# Patient Record
Sex: FEMALE | Race: WHITE | NOT HISPANIC OR LATINO | Employment: OTHER | URBAN - METROPOLITAN AREA
[De-identification: names, ages, dates, MRNs, and addresses within clinical notes are randomized per-mention and may not be internally consistent; named-entity substitution may affect disease eponyms.]

---

## 2017-09-02 ENCOUNTER — HOSPITAL ENCOUNTER (EMERGENCY)
Facility: OTHER | Age: 57
Discharge: HOME | End: 2017-09-02
Attending: INTERNAL MEDICINE
Payer: MEDICARE

## 2017-09-02 VITALS
RESPIRATION RATE: 17 BRPM | OXYGEN SATURATION: 100 % | WEIGHT: 150 LBS | HEART RATE: 67 BPM | BODY MASS INDEX: 24.99 KG/M2 | TEMPERATURE: 98 F | HEIGHT: 65 IN | SYSTOLIC BLOOD PRESSURE: 132 MMHG | DIASTOLIC BLOOD PRESSURE: 68 MMHG

## 2017-09-02 DIAGNOSIS — K59.03 DRUG-INDUCED CONSTIPATION: Primary | ICD-10-CM

## 2017-09-02 DIAGNOSIS — G89.18 LEFT LOWER QUADRANT ABDOMINAL PAIN FOLLOWING CHOLANGIOGRAM: ICD-10-CM

## 2017-09-02 DIAGNOSIS — R10.32 LEFT LOWER QUADRANT ABDOMINAL PAIN FOLLOWING CHOLANGIOGRAM: ICD-10-CM

## 2017-09-02 LAB
ALBUMIN SERPL-MCNC: 3.8 G/DL (ref 3.3–5.5)
ALP SERPL-CCNC: 76 U/L (ref 42–141)
BILIRUB SERPL-MCNC: 0.4 MG/DL (ref 0.2–1.6)
BUN SERPL-MCNC: 14 MG/DL (ref 7–22)
CALCIUM SERPL-MCNC: 9.4 MG/DL (ref 8–10.3)
CHLORIDE SERPL-SCNC: 101 MMOL/L (ref 98–108)
CREAT SERPL-MCNC: 1 MG/DL (ref 0.6–1.2)
GLUCOSE SERPL-MCNC: 86 MG/DL (ref 73–118)
POC ALT (SGPT): 12 U/L (ref 10–47)
POC AST (SGOT): 27 U/L (ref 11–38)
POC TCO2: 26 MMOL/L (ref 18–33)
POTASSIUM BLD-SCNC: 4.2 MMOL/L (ref 3.6–5.1)
PROTEIN, POC: 6.4 G/DL (ref 6.4–8.1)
SODIUM BLD-SCNC: 139 MMOL/L (ref 128–145)

## 2017-09-02 PROCEDURE — 80053 COMPREHEN METABOLIC PANEL: CPT

## 2017-09-02 PROCEDURE — 25000003 PHARM REV CODE 250: Performed by: INTERNAL MEDICINE

## 2017-09-02 PROCEDURE — 99283 EMERGENCY DEPT VISIT LOW MDM: CPT

## 2017-09-02 PROCEDURE — 85025 COMPLETE CBC W/AUTO DIFF WBC: CPT

## 2017-09-02 RX ORDER — SYRING-NEEDL,DISP,INSUL,0.3 ML 29 G X1/2"
296 SYRINGE, EMPTY DISPOSABLE MISCELLANEOUS
Status: COMPLETED | OUTPATIENT
Start: 2017-09-02 | End: 2017-09-02

## 2017-09-02 RX ORDER — ZOLPIDEM TARTRATE 10 MG/1
5 TABLET ORAL NIGHTLY PRN
COMMUNITY

## 2017-09-02 RX ADMIN — MAGNESIUM CITRATE 296 ML: 1.75 LIQUID ORAL at 06:09

## 2017-09-02 NOTE — ED NOTES
Report received from EMILY Laguna, states pt here for abd pain/constipation, awaiting urinalysis, will continue to monitor

## 2017-09-02 NOTE — ED PROVIDER NOTES
"Encounter Date: 9/2/2017       History     Chief Complaint   Patient presents with    Abdominal Pain     pt presents to ER with c/o LLQ pain since Tuesday, denies any nausea, vomiting or fever.  Has h/o IBS with constipation.       57-year-old female presents to the emergency department complaining of left lower quadrant pain ×4 days.  She states she has a history of recurring constipation because she takes opiate medications regularly for her neck and back.  She also states she had a "small" bowel movement yesterday.  She denies fevers/chills, nausea/vomiting      The history is provided by the patient. No  was used.   Abdominal Pain   The current episode started yesterday. The onset of the illness was gradual. The abdominal pain is located in the LLQ. The abdominal pain does not radiate. The severity of the abdominal pain is 4/10. The abdominal pain is relieved by nothing. The other symptoms of the illness do not include fever, melena, nausea, vomiting, diarrhea or dysuria.   Symptoms associated with the illness do not include frequency.     Review of patient's allergies indicates:   Allergen Reactions    Bee pollens     Mushroom flavor      Past Medical History:   Diagnosis Date    Arthritis     Chronic back pain     Encounter for blood transfusion     Fatigue     Fibromyalgia     GERD (gastroesophageal reflux disease)     Herniated disc     Multiple sclerosis     Ovarian tumor     Radiculopathy     Restless legs syndrome (RLS)     Spasticity     Stroke     twice    TIA (transient ischemic attack)      Past Surgical History:   Procedure Laterality Date    CEREBRAL ANEURYSM REPAIR      HYSTERECTOMY      age 27    KNEE SURGERY  2008    left    pain pump implant      AL REMOVAL OF OVARY/TUBE(S)      SPINE SURGERY  2006      2 on neck.  2 on lumbar spine    TONSILLECTOMY      WRIST SURGERY  2007    right     Family History   Problem Relation Age of Onset    Cancer Mother  " "    Lung cancer    Cancer Father      Lung cancer     Social History   Substance Use Topics    Smoking status: Current Every Day Smoker     Packs/day: 0.50     Years: 30.00     Types: Cigarettes    Smokeless tobacco: Never Used      Comment: .    Occup:   disabled.   , lives alone.      Alcohol use Yes      Comment: socially     Review of Systems   Constitutional: Negative for fever.   Gastrointestinal: Positive for abdominal pain. Negative for diarrhea, melena, nausea and vomiting.   Genitourinary: Negative for dysuria, flank pain and frequency.   All other systems reviewed and are negative.      Physical Exam     Initial Vitals [17 0500]   BP Pulse Resp Temp SpO2   (!) 150/90 87 18 97.7 °F (36.5 °C) 98 %      MAP       110         Physical Exam    Nursing note and vitals reviewed.  Constitutional: She appears well-developed and well-nourished.   HENT:   Head: Normocephalic and atraumatic.   Right Ear: External ear normal.   Left Ear: External ear normal.   Eyes: EOM are normal.   Neck: Normal range of motion.   Cardiovascular: Normal rate, regular rhythm and normal heart sounds.   Pulmonary/Chest: Breath sounds normal. No respiratory distress.   Abdominal: Soft. Bowel sounds are normal. She exhibits no distension and no mass. There is tenderness. There is no rebound and no guarding.   Musculoskeletal: Normal range of motion.   Neurological: She is alert.   Skin: Skin is warm and dry.   Psychiatric: She has a normal mood and affect. Thought content normal.         ED Course   Procedures  Labs Reviewed   POCT CBC   POCT CMP   POCT CMP             Medical Decision Making:   Initial Assessment:   57-year-old female presents to the emergency department complaining of left lower quadrant pain ×4 days.  She states she has a history of recurring constipation because she takes opiate medications regularly for her neck and back.  She also states she had a "small" bowel movement yesterday.  She denies " fevers/chills, nausea/vomiting  Differential Diagnosis:   Constipation  Acute diverticulitis  Acute appendicitis  Acute cystitis  ED Management:  CBC and complete metabolic panel were performed.  CMP was within normal limits.  X-ray of abdomen revealed signs of constipation.  Patient was given instructions for constipation and magnesium citrate in the emergency department.  She was advised to follow-up with her primary care physician in 2 days for reevaluation.                   ED Course      Clinical Impression:   The primary encounter diagnosis was Drug-induced constipation. A diagnosis of Left lower quadrant abdominal pain following cholangiogram was also pertinent to this visit.    Disposition:   Disposition: Discharged  Condition: Stable                        Jose Angel Samano MD  09/02/17 0714

## 2017-09-02 NOTE — ED NOTES
Patient given copy of discharge instructions and prescriptions. New medications were explained to patient. Patient verbalizes understanding. Stressed the importance of f/u appointment and when to return for worsening symptoms. Patient was then escorted by RN to discharge window

## 2017-09-02 NOTE — ED NOTES
Pt supine in bed, alert and oriented, reports left lower abd pain for the past several days, pt is alert and oriented, ARIELLE

## 2017-12-04 PROBLEM — R10.32 LEFT LOWER QUADRANT ABDOMINAL PAIN FOLLOWING CHOLANGIOGRAM: Status: RESOLVED | Noted: 2017-09-02 | Resolved: 2017-12-04

## 2017-12-04 PROBLEM — G89.18 LEFT LOWER QUADRANT ABDOMINAL PAIN FOLLOWING CHOLANGIOGRAM: Status: RESOLVED | Noted: 2017-09-02 | Resolved: 2017-12-04

## 2018-02-05 ENCOUNTER — OFFICE VISIT (OUTPATIENT)
Dept: NEUROSURGERY | Facility: CLINIC | Age: 58
End: 2018-02-05
Payer: MEDICARE

## 2018-02-05 VITALS
HEART RATE: 82 BPM | SYSTOLIC BLOOD PRESSURE: 152 MMHG | HEIGHT: 66 IN | WEIGHT: 143.31 LBS | DIASTOLIC BLOOD PRESSURE: 93 MMHG | TEMPERATURE: 99 F | BODY MASS INDEX: 23.03 KG/M2

## 2018-02-05 DIAGNOSIS — G89.29 CHRONIC LOW BACK PAIN, UNSPECIFIED BACK PAIN LATERALITY, WITH SCIATICA PRESENCE UNSPECIFIED: Primary | ICD-10-CM

## 2018-02-05 DIAGNOSIS — G89.4 CHRONIC PAIN SYNDROME: ICD-10-CM

## 2018-02-05 DIAGNOSIS — M96.1 LUMBAR POST-LAMINECTOMY SYNDROME: ICD-10-CM

## 2018-02-05 DIAGNOSIS — M54.5 CHRONIC LOW BACK PAIN, UNSPECIFIED BACK PAIN LATERALITY, WITH SCIATICA PRESENCE UNSPECIFIED: Primary | ICD-10-CM

## 2018-02-05 PROCEDURE — 99999 PR PBB SHADOW E&M-EST. PATIENT-LVL III: CPT | Mod: PBBFAC,,, | Performed by: NEUROLOGICAL SURGERY

## 2018-02-05 PROCEDURE — 99214 OFFICE O/P EST MOD 30 MIN: CPT | Mod: S$GLB,,, | Performed by: NEUROLOGICAL SURGERY

## 2018-02-05 PROCEDURE — 3008F BODY MASS INDEX DOCD: CPT | Mod: S$GLB,,, | Performed by: NEUROLOGICAL SURGERY

## 2018-02-05 RX ORDER — OMEPRAZOLE 20 MG/1
CAPSULE, DELAYED RELEASE ORAL
COMMUNITY
Start: 2017-12-27

## 2018-02-05 RX ORDER — DULOXETIN HYDROCHLORIDE 60 MG/1
60 CAPSULE, DELAYED RELEASE ORAL
COMMUNITY
End: 2018-05-21 | Stop reason: ALTCHOICE

## 2018-02-05 RX ORDER — NAPROXEN 500 MG/1
TABLET ORAL
Status: ON HOLD | COMMUNITY
Start: 2018-01-04 | End: 2018-02-27 | Stop reason: HOSPADM

## 2018-02-05 NOTE — PROGRESS NOTES
History & Physical    SUBJECTIVE:     Chief Complaint: Chronic pain syndrome.    History of Present Illness:  Ms. Reilly is a 58-year-old female who is referred to me by Dr. Neyamr Horner.  Her past medical history is significant for both sclerosis, fibromyalgia, RLS, stroke, chronic pain syndrome, and lumbar post-laminectomy syndrome.  He has a complicated past surgical history which includes 2 surgeries on her neck and 2 surgeries on her back which were done by Dr. Rob.  She underwent an intrathecal catheter and pain pump placement which did not help her pain and was ultimately removed.  Despite the surgeries in the pain pump placement she still complains of low back pain and neck pain.  She was evaluated for surgical intervention and was deemed to not be a surgical candidate.  Ultimately, she underwent a spinal cord stimulator trial which was complicated by leak migration.  She felt that she had good enough relief during the trial to proceed with the permanent placement.  This was done in 2016.  She now states the stimulator never helped her pain.  In November 2017 she tried to turn the stimulator on and felt an electrical shock.  Therefore, she now wishes to have it removed.    Review of patient's allergies indicates:   Allergen Reactions    Bee pollens     Mushroom flavor        Current Outpatient Prescriptions   Medication Sig Dispense Refill    aspirin 81 MG Chew Take 81 mg by mouth once daily.      celecoxib (CELEBREX) 200 MG capsule Take 200 mg by mouth 2 (two) times daily.        cholecalciferol, vitamin D3, (VITAMIN D3) 1,000 unit capsule Take 1,000 Units by mouth once daily.      DULoxetine (CYMBALTA) 60 MG capsule Take 60 mg by mouth.      hydrocodone-acetaminophen 10-325mg (NORCO)  mg Tab Take 1 tablet by mouth every 4 to 6 hours as needed. 14 tablet 0    naproxen (NAPROSYN) 500 MG tablet       omeprazole (PRILOSEC) 20 MG capsule       QUETIAPINE FUMARATE (SEROQUEL ORAL) Take by mouth.       solifenacin (VESICARE) 5 MG tablet Take 10 mg by mouth once daily.      vitamin E 400 UNIT capsule Take 400 Units by mouth once daily.      zolpidem (AMBIEN) 10 mg Tab Take 5 mg by mouth nightly as needed.       No current facility-administered medications for this visit.        Past Medical History:   Diagnosis Date    Arthritis     Chronic back pain     Encounter for blood transfusion     Fatigue     Fibromyalgia     GERD (gastroesophageal reflux disease)     Herniated disc     Multiple sclerosis     Ovarian tumor     Radiculopathy     Restless legs syndrome (RLS)     Spasticity     Stroke     twice    TIA (transient ischemic attack)      Past Surgical History:   Procedure Laterality Date    CEREBRAL ANEURYSM REPAIR      HYSTERECTOMY      age 27    KNEE SURGERY  2008    left    pain pump implant      PA REMOVAL OF OVARY/TUBE(S)      SPINE SURGERY  2006      2 on neck.  2 on lumbar spine    TONSILLECTOMY      WRIST SURGERY  2007    right     Family History     Problem Relation (Age of Onset)    Cancer Mother, Father        Social History     Social History    Marital status:      Spouse name: N/A    Number of children: N/A    Years of education: N/A     Social History Main Topics    Smoking status: Current Every Day Smoker     Packs/day: 0.50     Years: 30.00     Types: Cigarettes    Smokeless tobacco: Never Used      Comment: .    Occup:   disabled.   , lives alone.      Alcohol use Yes      Comment: socially    Drug use: No    Sexual activity: No     Other Topics Concern    None     Social History Narrative    None       Review of Systems:  Review of Systems   Constitutional: Negative for activity change, diaphoresis, fatigue, fever and unexpected weight change.   Eyes: Positive for visual disturbance.   Respiratory: Negative for cough, shortness of breath and wheezing.    Cardiovascular: Negative for chest pain and palpitations.   Gastrointestinal:  "Positive for abdominal distention and constipation. Negative for abdominal pain, blood in stool, diarrhea, nausea and vomiting.   Genitourinary: Negative for difficulty urinating, dysuria, frequency and urgency.   Musculoskeletal: Positive for back pain and neck pain. Negative for gait problem, joint swelling, myalgias and neck stiffness.   Skin: Negative for color change, rash and wound.   Allergic/Immunologic: Negative for environmental allergies and food allergies.   Neurological: Positive for headaches. Negative for dizziness, seizures, facial asymmetry, speech difficulty, weakness, light-headedness and numbness.   Hematological: Does not bruise/bleed easily.   Psychiatric/Behavioral: Negative for agitation, behavioral problems, dysphoric mood and hallucinations. The patient is not nervous/anxious.        OBJECTIVE:     Vital Signs  Temp: 98.9 °F (37.2 °C)  Pulse: 82  BP: (!) 152/93  Pain Score:   7  Height: 5' 6" (167.6 cm)  Weight: 65 kg (143 lb 4.8 oz)  Body mass index is 23.13 kg/m².      Physical Exam:  Physical Exam:    Constitutional: She appears well-developed and well-nourished. No distress.     Eyes: Pupils are equal, round, and reactive to light. Conjunctivae and EOM are normal.     Cardiovascular: Normal rate, regular rhythm, normal pulses and no edema.     Abdominal: Soft. Bowel sounds are normal.     Skin: Skin displays no rash on trunk and no rash on extremities. Skin displays no lesions on trunk and no lesions on extremities.     Psych/Behavior: She is alert. She is oriented to person, place, and time. She has a normal mood and affect.     Musculoskeletal: Gait is normal.        Neck: Range of motion is full. There is no tenderness. Muscle strength is 5/5. Tone is normal.        Back: Range of motion is full. There is no tenderness. Muscle strength is 5/5. Tone is normal.        Right Upper Extremities: Range of motion is full. There is no tenderness. Muscle strength is 5/5. Tone is normal.       "  Left Upper Extremities: Range of motion is full. There is no tenderness. Muscle strength is 5/5. Tone is normal.       Right Lower Extremities: Range of motion is full. There is no tenderness. Muscle strength is 5/5. Tone is normal.        Left Lower Extremities: Range of motion is full. There is no tenderness. Muscle strength is 5/5. Tone is normal.     Neurological:        Coordination: She has a normal Romberg Test, normal finger to nose coordination and normal tandem walking coordination.        Sensory: There is no sensory deficit in the trunk. There is no sensory deficit in the extremities.        DTRs: DTRs are DTRS NORMAL AND SYMMETRICnormal and symmetric. She displays no Babinski's sign on the right side. She displays no Babinski's sign on the left side.        Cranial nerves: Cranial nerve(s) II, III, IV, V, VI, VII, VIII, IX, X, XI and XII are intact.         Diagnostic Results:  She has an abdominal x-ray available for review which I personally reviewed.  She has 2 percutaneous electrodes in the thoracic spine, one from T8-T10 and the second from T9-T11.  There is a pulse generator over the right gluteal region.    ASSESSMENT/PLAN:     Ms Reilly is a 58-year-old female with multiple cervical and lumbar surgeries.  She has undergone both a pain pump and spinal cord stimulator neither of which have helped her pain significantly.  The spinal cord simulator now seems to be malfunctioning and she wishes to have it removed.  I explained the procedure in detail to the patient as well as the risks and benefits and pros and cons.  She wishes to proceed at this time.  We will get all the appropriate preoperative testing and schedule surgery in the near future.  She knows that she needs to be off of her aspirin for 1 week prior to surgery.  She knows that she can call with any further questions or concerns.        Note dictated with voice recognition software, please excuse any grammatical errors.

## 2018-02-06 ENCOUNTER — TELEPHONE (OUTPATIENT)
Dept: NEUROSURGERY | Facility: CLINIC | Age: 58
End: 2018-02-06

## 2018-02-06 DIAGNOSIS — G89.4 CHRONIC PAIN SYNDROME: Primary | ICD-10-CM

## 2018-02-06 DIAGNOSIS — M96.1 POST LAMINECTOMY SYNDROME: ICD-10-CM

## 2018-02-26 ENCOUNTER — ANESTHESIA EVENT (OUTPATIENT)
Dept: SURGERY | Facility: HOSPITAL | Age: 58
End: 2018-02-26
Payer: MEDICARE

## 2018-02-26 NOTE — ANESTHESIA PREPROCEDURE EVALUATION
02/26/2018  Courtney Reilly is a 58 y.o., female with a pre-operative diagnosis of Chronic pain syndrome [G89.4]  Post laminectomy syndrome [M96.1] who is scheduled for Procedure(s) (LRB):  REMOVAL-SPINAL NEUROSTIMULATOR (N/A).     Requested anesthesia type: General  Surgeon: Bernadette Thompson MD  Allergies:   Review of patient's allergies indicates:   Allergen Reactions    Bee pollens     Mushroom flavor      Vital Sign Range: Temp:  [36.9 °C (98.4 °F)] 36.9 °C (98.4 °F)  Pulse:  [73] 73  Resp:  [18] 18  SpO2:  [100 %] 100 %  BP: (150)/(84) 150/84  Chronic Medications:   Prescriptions Prior to Admission   Medication Sig Dispense Refill Last Dose    aspirin 81 MG Chew Take 81 mg by mouth once daily.   Past Week at Unknown time    celecoxib (CELEBREX) 200 MG capsule Take 200 mg by mouth 2 (two) times daily.     Past Week at Unknown time    cholecalciferol, vitamin D3, (VITAMIN D3) 1,000 unit capsule Take 1,000 Units by mouth once daily.   2/26/2018 at 1100    DULoxetine (CYMBALTA) 60 MG capsule Take 60 mg by mouth.   2/26/2018 at 1100    hydrocodone-acetaminophen 10-325mg (NORCO)  mg Tab Take 1 tablet by mouth every 4 to 6 hours as needed. 14 tablet 0 2/26/2018 at 2000    naproxen (NAPROSYN) 500 MG tablet    2/26/2018 at 1100    omeprazole (PRILOSEC) 20 MG capsule    Past Week at Unknown time    QUETIAPINE FUMARATE (SEROQUEL ORAL) Take by mouth.   2/26/2018 at 2000    solifenacin (VESICARE) 5 MG tablet Take 10 mg by mouth once daily.   Past Week at Unknown time    vitamin E 400 UNIT capsule Take 400 Units by mouth once daily.   2/26/2018 at 1100    zolpidem (AMBIEN) 10 mg Tab Take 5 mg by mouth nightly as needed.   Past Week     Current Medications:   Current Facility-Administered Medications   Medication Dose Route Frequency Provider Last Rate Last Dose    0.9%  NaCl infusion   Intravenous  Continuous Jasbir Mckee MD 10 mL/hr at 02/27/18 0600 10 mL/hr at 02/27/18 0600    0.9%  NaCl infusion   Intravenous Continuous Jan Avila MD        ceFAZolin injection 2 g  2 g Intravenous On Call Procedure Jan Avila MD        lidocaine (PF) 10 mg/ml (1%) injection 10 mg  1 mL Intradermal Once Jasbir Mckee MD        mupirocin 2 % ointment   Nasal On Call Procedure Jan Avila MD         Medical History:   Past Medical History:   Diagnosis Date    Arthritis     Chronic back pain     Encounter for blood transfusion     Fatigue     Fibromyalgia     GERD (gastroesophageal reflux disease)     Herniated disc     Multiple sclerosis     Ovarian tumor     Radiculopathy     Restless legs syndrome (RLS)     Spasticity     Stroke     twice    TIA (transient ischemic attack)      .    Anesthesia Evaluation    I have reviewed the Patient Summary Reports.    I have reviewed the Nursing Notes.   I have reviewed the Medications.     Review of Systems  Anesthesia Hx:  No problems with previous Anesthesia  Denies Family Hx of Anesthesia complications.   Denies Personal Hx of Anesthesia complications.   Hepatic/GI:   GERD, well controlled    Musculoskeletal:   Arthritis   Spine Disorders: lumbar and thoracic    Neurological:   TIA, CVA Neuromuscular Disease,        Physical Exam   Airway/Jaw/Neck:  Airway Findings: Mouth Opening: Normal Tongue: Normal  General Airway Assessment: Adult, Good  Jaw/Neck Findings:     Neck ROM: Normal ROM      Dental:  Dental Findings: Upper Dentures, Lower Dentures   Chest/Lungs:  Chest/Lungs Findings: Clear to auscultation, Normal Respiratory Rate     Heart/Vascular:  Heart Findings: Rate: Normal  Rhythm: Regular Rhythm  Sounds: Normal     Abdomen:  Abdomen Findings:  Normal, Soft, Nontender            Anesthesia Plan  Type of Anesthesia, risks & benefits discussed:  Anesthesia Type:  general, MAC  Patient's Preference: as indicated  Intra-op Monitoring Plan:  standard ASA monitors  Intra-op Monitoring Plan Comments:   Post Op Pain Control Plan:   Post Op Pain Control Plan Comments:   Induction:   IV  Beta Blocker:  Patient is not currently on a Beta-Blocker (No further documentation required).       Informed Consent: Patient understands risks and agrees with Anesthesia plan.  Questions answered. Anesthesia consent signed with patient.  ASA Score: 3     Day of Surgery Review of History & Physical:  There are no significant changes.  H&P update referred to the provider.     Anesthesia Plan Notes: Reassurance given.        Ready For Surgery From Anesthesia Perspective.

## 2018-02-27 ENCOUNTER — ANESTHESIA (OUTPATIENT)
Dept: SURGERY | Facility: HOSPITAL | Age: 58
End: 2018-02-27
Payer: MEDICARE

## 2018-02-27 ENCOUNTER — HOSPITAL ENCOUNTER (OUTPATIENT)
Facility: HOSPITAL | Age: 58
Discharge: HOME | End: 2018-02-27
Attending: NEUROLOGICAL SURGERY | Admitting: NEUROLOGICAL SURGERY
Payer: MEDICARE

## 2018-02-27 VITALS
OXYGEN SATURATION: 99 % | BODY MASS INDEX: 21.97 KG/M2 | WEIGHT: 140 LBS | SYSTOLIC BLOOD PRESSURE: 145 MMHG | DIASTOLIC BLOOD PRESSURE: 85 MMHG | RESPIRATION RATE: 15 BRPM | HEIGHT: 67 IN | TEMPERATURE: 98 F | HEART RATE: 69 BPM

## 2018-02-27 DIAGNOSIS — G89.29 CHRONIC PAIN: ICD-10-CM

## 2018-02-27 LAB
ABO + RH BLD: NORMAL
APTT BLDCRRT: 26.4 SEC
BLD GP AB SCN CELLS X3 SERPL QL: NORMAL
INR PPP: 0.9
PROTHROMBIN TIME: 9.8 SEC

## 2018-02-27 PROCEDURE — S0028 INJECTION, FAMOTIDINE, 20 MG: HCPCS | Performed by: NURSE ANESTHETIST, CERTIFIED REGISTERED

## 2018-02-27 PROCEDURE — 36000707: Performed by: NEUROLOGICAL SURGERY

## 2018-02-27 PROCEDURE — 86850 RBC ANTIBODY SCREEN: CPT

## 2018-02-27 PROCEDURE — 27201423 OPTIME MED/SURG SUP & DEVICES STERILE SUPPLY: Performed by: NEUROLOGICAL SURGERY

## 2018-02-27 PROCEDURE — D9220A PRA ANESTHESIA: Mod: ANES,,, | Performed by: ANESTHESIOLOGY

## 2018-02-27 PROCEDURE — 63661 REMOVE SPINE ELTRD PERQ ARAY: CPT | Mod: 51,,, | Performed by: NEUROLOGICAL SURGERY

## 2018-02-27 PROCEDURE — 63600175 PHARM REV CODE 636 W HCPCS: Performed by: NURSE ANESTHETIST, CERTIFIED REGISTERED

## 2018-02-27 PROCEDURE — 63600175 PHARM REV CODE 636 W HCPCS: Performed by: STUDENT IN AN ORGANIZED HEALTH CARE EDUCATION/TRAINING PROGRAM

## 2018-02-27 PROCEDURE — 94761 N-INVAS EAR/PLS OXIMETRY MLT: CPT

## 2018-02-27 PROCEDURE — 25000003 PHARM REV CODE 250: Performed by: NEUROLOGICAL SURGERY

## 2018-02-27 PROCEDURE — 71000039 HC RECOVERY, EACH ADD'L HOUR: Performed by: NEUROLOGICAL SURGERY

## 2018-02-27 PROCEDURE — 37000009 HC ANESTHESIA EA ADD 15 MINS: Performed by: NEUROLOGICAL SURGERY

## 2018-02-27 PROCEDURE — 25000003 PHARM REV CODE 250: Performed by: NURSE ANESTHETIST, CERTIFIED REGISTERED

## 2018-02-27 PROCEDURE — 25000003 PHARM REV CODE 250: Performed by: ANESTHESIOLOGY

## 2018-02-27 PROCEDURE — 85610 PROTHROMBIN TIME: CPT

## 2018-02-27 PROCEDURE — S0028 INJECTION, FAMOTIDINE, 20 MG: HCPCS | Performed by: ANESTHESIOLOGY

## 2018-02-27 PROCEDURE — 63688 REV/RMV IMP SP NPG/R DTCH CN: CPT | Mod: ,,, | Performed by: NEUROLOGICAL SURGERY

## 2018-02-27 PROCEDURE — 71000033 HC RECOVERY, INTIAL HOUR: Performed by: NEUROLOGICAL SURGERY

## 2018-02-27 PROCEDURE — 25000003 PHARM REV CODE 250: Performed by: STUDENT IN AN ORGANIZED HEALTH CARE EDUCATION/TRAINING PROGRAM

## 2018-02-27 PROCEDURE — 27000221 HC OXYGEN, UP TO 24 HOURS

## 2018-02-27 PROCEDURE — 63600175 PHARM REV CODE 636 W HCPCS: Performed by: NEUROLOGICAL SURGERY

## 2018-02-27 PROCEDURE — 85730 THROMBOPLASTIN TIME PARTIAL: CPT

## 2018-02-27 PROCEDURE — D9220A PRA ANESTHESIA: Mod: CRNA,,, | Performed by: NURSE ANESTHETIST, CERTIFIED REGISTERED

## 2018-02-27 PROCEDURE — 37000008 HC ANESTHESIA 1ST 15 MINUTES: Performed by: NEUROLOGICAL SURGERY

## 2018-02-27 PROCEDURE — 71000015 HC POSTOP RECOV 1ST HR: Performed by: NEUROLOGICAL SURGERY

## 2018-02-27 PROCEDURE — 36000706: Performed by: NEUROLOGICAL SURGERY

## 2018-02-27 PROCEDURE — 25000003 PHARM REV CODE 250

## 2018-02-27 RX ORDER — MUPIROCIN 20 MG/G
OINTMENT TOPICAL
Status: DISCONTINUED | OUTPATIENT
Start: 2018-02-27 | End: 2018-02-27 | Stop reason: HOSPADM

## 2018-02-27 RX ORDER — MUPIROCIN 20 MG/G
1 OINTMENT TOPICAL 2 TIMES DAILY
Status: DISCONTINUED | OUTPATIENT
Start: 2018-02-27 | End: 2018-02-27 | Stop reason: HOSPADM

## 2018-02-27 RX ORDER — FENTANYL CITRATE 50 UG/ML
INJECTION, SOLUTION INTRAMUSCULAR; INTRAVENOUS
Status: DISCONTINUED
Start: 2018-02-27 | End: 2018-02-27 | Stop reason: WASHOUT

## 2018-02-27 RX ORDER — ONDANSETRON 2 MG/ML
4 INJECTION INTRAMUSCULAR; INTRAVENOUS DAILY PRN
Status: DISCONTINUED | OUTPATIENT
Start: 2018-02-27 | End: 2018-02-27 | Stop reason: HOSPADM

## 2018-02-27 RX ORDER — OXYCODONE AND ACETAMINOPHEN 10; 325 MG/1; MG/1
1 TABLET ORAL EVERY 4 HOURS PRN
Status: DISCONTINUED | OUTPATIENT
Start: 2018-02-27 | End: 2018-02-27 | Stop reason: HOSPADM

## 2018-02-27 RX ORDER — VANCOMYCIN HYDROCHLORIDE 1 G/20ML
INJECTION, POWDER, LYOPHILIZED, FOR SOLUTION INTRAVENOUS
Status: DISCONTINUED | OUTPATIENT
Start: 2018-02-27 | End: 2018-02-27 | Stop reason: HOSPADM

## 2018-02-27 RX ORDER — OXYCODONE AND ACETAMINOPHEN 10; 325 MG/1; MG/1
TABLET ORAL
Status: COMPLETED
Start: 2018-02-27 | End: 2018-02-27

## 2018-02-27 RX ORDER — PROPOFOL 10 MG/ML
VIAL (ML) INTRAVENOUS
Status: DISCONTINUED | OUTPATIENT
Start: 2018-02-27 | End: 2018-02-27

## 2018-02-27 RX ORDER — DEXAMETHASONE SODIUM PHOSPHATE 4 MG/ML
INJECTION, SOLUTION INTRA-ARTICULAR; INTRALESIONAL; INTRAMUSCULAR; INTRAVENOUS; SOFT TISSUE
Status: DISCONTINUED | OUTPATIENT
Start: 2018-02-27 | End: 2018-02-27

## 2018-02-27 RX ORDER — MUPIROCIN 20 MG/G
1 OINTMENT TOPICAL
Status: COMPLETED | OUTPATIENT
Start: 2018-02-27 | End: 2018-02-27

## 2018-02-27 RX ORDER — ACETAMINOPHEN 10 MG/ML
INJECTION, SOLUTION INTRAVENOUS
Status: DISCONTINUED | OUTPATIENT
Start: 2018-02-27 | End: 2018-02-27

## 2018-02-27 RX ORDER — MIDAZOLAM HYDROCHLORIDE 1 MG/ML
INJECTION, SOLUTION INTRAMUSCULAR; INTRAVENOUS
Status: DISCONTINUED | OUTPATIENT
Start: 2018-02-27 | End: 2018-02-27

## 2018-02-27 RX ORDER — PHENYLEPHRINE HYDROCHLORIDE 10 MG/ML
INJECTION INTRAVENOUS
Status: DISCONTINUED | OUTPATIENT
Start: 2018-02-27 | End: 2018-02-27

## 2018-02-27 RX ORDER — LIDOCAINE HYDROCHLORIDE AND EPINEPHRINE 10; 10 MG/ML; UG/ML
INJECTION, SOLUTION INFILTRATION; PERINEURAL
Status: DISCONTINUED | OUTPATIENT
Start: 2018-02-27 | End: 2018-02-27 | Stop reason: HOSPADM

## 2018-02-27 RX ORDER — ROCURONIUM BROMIDE 10 MG/ML
INJECTION, SOLUTION INTRAVENOUS
Status: DISCONTINUED | OUTPATIENT
Start: 2018-02-27 | End: 2018-02-27

## 2018-02-27 RX ORDER — METOCLOPRAMIDE HYDROCHLORIDE 5 MG/ML
10 INJECTION INTRAMUSCULAR; INTRAVENOUS EVERY 10 MIN PRN
Status: DISCONTINUED | OUTPATIENT
Start: 2018-02-27 | End: 2018-02-27 | Stop reason: HOSPADM

## 2018-02-27 RX ORDER — GLYCOPYRROLATE 0.2 MG/ML
INJECTION INTRAMUSCULAR; INTRAVENOUS
Status: DISCONTINUED | OUTPATIENT
Start: 2018-02-27 | End: 2018-02-27

## 2018-02-27 RX ORDER — ACETAMINOPHEN 325 MG/1
325 TABLET ORAL EVERY 6 HOURS PRN
Status: DISCONTINUED | OUTPATIENT
Start: 2018-02-27 | End: 2018-02-27 | Stop reason: HOSPADM

## 2018-02-27 RX ORDER — METOPROLOL TARTRATE 1 MG/ML
INJECTION, SOLUTION INTRAVENOUS
Status: DISCONTINUED | OUTPATIENT
Start: 2018-02-27 | End: 2018-02-27

## 2018-02-27 RX ORDER — SODIUM CHLORIDE 9 MG/ML
INJECTION, SOLUTION INTRAVENOUS CONTINUOUS
Status: DISCONTINUED | OUTPATIENT
Start: 2018-02-27 | End: 2018-02-27 | Stop reason: HOSPADM

## 2018-02-27 RX ORDER — ONDANSETRON 2 MG/ML
INJECTION INTRAMUSCULAR; INTRAVENOUS
Status: DISCONTINUED | OUTPATIENT
Start: 2018-02-27 | End: 2018-02-27

## 2018-02-27 RX ORDER — FAMOTIDINE 10 MG/ML
20 INJECTION INTRAVENOUS ONCE
Status: COMPLETED | OUTPATIENT
Start: 2018-02-27 | End: 2018-02-27

## 2018-02-27 RX ORDER — FAMOTIDINE 10 MG/ML
INJECTION INTRAVENOUS
Status: DISCONTINUED | OUTPATIENT
Start: 2018-02-27 | End: 2018-02-27

## 2018-02-27 RX ORDER — LIDOCAINE HYDROCHLORIDE 10 MG/ML
1 INJECTION, SOLUTION EPIDURAL; INFILTRATION; INTRACAUDAL; PERINEURAL ONCE
Status: DISCONTINUED | OUTPATIENT
Start: 2018-02-27 | End: 2018-02-27 | Stop reason: HOSPADM

## 2018-02-27 RX ORDER — CEFAZOLIN SODIUM 1 G/3ML
2 INJECTION, POWDER, FOR SOLUTION INTRAMUSCULAR; INTRAVENOUS
Status: COMPLETED | OUTPATIENT
Start: 2018-02-27 | End: 2018-02-27

## 2018-02-27 RX ORDER — ONDANSETRON 2 MG/ML
4 INJECTION INTRAMUSCULAR; INTRAVENOUS EVERY 8 HOURS PRN
Status: DISCONTINUED | OUTPATIENT
Start: 2018-02-27 | End: 2018-02-27 | Stop reason: HOSPADM

## 2018-02-27 RX ORDER — CEPHALEXIN 500 MG/1
500 CAPSULE ORAL EVERY 6 HOURS
Qty: 16 CAPSULE | Refills: 0 | Status: SHIPPED | OUTPATIENT
Start: 2018-02-27 | End: 2018-03-03

## 2018-02-27 RX ORDER — BACITRACIN 50000 [IU]/1
INJECTION, POWDER, FOR SOLUTION INTRAMUSCULAR
Status: DISCONTINUED | OUTPATIENT
Start: 2018-02-27 | End: 2018-02-27 | Stop reason: HOSPADM

## 2018-02-27 RX ORDER — KETAMINE HCL IN 0.9 % NACL 50 MG/5 ML
SYRINGE (ML) INTRAVENOUS
Status: DISCONTINUED | OUTPATIENT
Start: 2018-02-27 | End: 2018-02-27

## 2018-02-27 RX ORDER — FENTANYL CITRATE 50 UG/ML
25 INJECTION, SOLUTION INTRAMUSCULAR; INTRAVENOUS EVERY 5 MIN PRN
Status: DISCONTINUED | OUTPATIENT
Start: 2018-02-27 | End: 2018-02-27 | Stop reason: HOSPADM

## 2018-02-27 RX ORDER — FENTANYL CITRATE 50 UG/ML
INJECTION, SOLUTION INTRAMUSCULAR; INTRAVENOUS
Status: DISCONTINUED | OUTPATIENT
Start: 2018-02-27 | End: 2018-02-27

## 2018-02-27 RX ORDER — OXYCODONE AND ACETAMINOPHEN 5; 325 MG/1; MG/1
1 TABLET ORAL EVERY 4 HOURS PRN
Qty: 15 TABLET | Refills: 0 | Status: SHIPPED | OUTPATIENT
Start: 2018-02-27 | End: 2018-04-09 | Stop reason: ALTCHOICE

## 2018-02-27 RX ORDER — LIDOCAINE HCL/PF 100 MG/5ML
SYRINGE (ML) INTRAVENOUS
Status: DISCONTINUED | OUTPATIENT
Start: 2018-02-27 | End: 2018-02-27

## 2018-02-27 RX ORDER — NEOSTIGMINE METHYLSULFATE 1 MG/ML
INJECTION, SOLUTION INTRAVENOUS
Status: DISCONTINUED | OUTPATIENT
Start: 2018-02-27 | End: 2018-02-27

## 2018-02-27 RX ADMIN — PHENYLEPHRINE HYDROCHLORIDE 200 MCG: 10 INJECTION INTRAVENOUS at 08:02

## 2018-02-27 RX ADMIN — FAMOTIDINE 20 MG: 10 INJECTION, SOLUTION INTRAVENOUS at 05:02

## 2018-02-27 RX ADMIN — CEFAZOLIN 2 G: 330 INJECTION, POWDER, FOR SOLUTION INTRAMUSCULAR; INTRAVENOUS at 07:02

## 2018-02-27 RX ADMIN — PROPOFOL 200 MG: 10 INJECTION, EMULSION INTRAVENOUS at 07:02

## 2018-02-27 RX ADMIN — OXYCODONE AND ACETAMINOPHEN 1 TABLET: 10; 325 TABLET ORAL at 09:02

## 2018-02-27 RX ADMIN — METOPROLOL TARTRATE 2 MG: 5 INJECTION, SOLUTION INTRAVENOUS at 07:02

## 2018-02-27 RX ADMIN — PHENYLEPHRINE HYDROCHLORIDE 150 MCG: 10 INJECTION INTRAVENOUS at 08:02

## 2018-02-27 RX ADMIN — NEOSTIGMINE METHYLSULFATE 3 MG: 1 INJECTION INTRAVENOUS at 08:02

## 2018-02-27 RX ADMIN — FAMOTIDINE 20 MG: 10 INJECTION, SOLUTION INTRAVENOUS at 08:02

## 2018-02-27 RX ADMIN — ACETAMINOPHEN 1000 MG: 10 INJECTION, SOLUTION INTRAVENOUS at 07:02

## 2018-02-27 RX ADMIN — MIDAZOLAM HYDROCHLORIDE 2 MG: 1 INJECTION, SOLUTION INTRAMUSCULAR; INTRAVENOUS at 06:02

## 2018-02-27 RX ADMIN — METOPROLOL TARTRATE 1 MG: 5 INJECTION, SOLUTION INTRAVENOUS at 08:02

## 2018-02-27 RX ADMIN — Medication 30 MG: at 07:02

## 2018-02-27 RX ADMIN — GLYCOPYRROLATE 0.4 MG: 0.2 INJECTION, SOLUTION INTRAMUSCULAR; INTRAVENOUS at 08:02

## 2018-02-27 RX ADMIN — MIDAZOLAM HYDROCHLORIDE 2 MG: 1 INJECTION, SOLUTION INTRAMUSCULAR; INTRAVENOUS at 07:02

## 2018-02-27 RX ADMIN — GLYCOPYRROLATE 0.2 MG: 0.2 INJECTION, SOLUTION INTRAMUSCULAR; INTRAVENOUS at 06:02

## 2018-02-27 RX ADMIN — MUPIROCIN 1 G: 20 OINTMENT TOPICAL at 05:02

## 2018-02-27 RX ADMIN — SODIUM CHLORIDE 10 ML/HR: 0.9 INJECTION, SOLUTION INTRAVENOUS at 06:02

## 2018-02-27 RX ADMIN — LIDOCAINE HYDROCHLORIDE 100 MG: 20 INJECTION, SOLUTION INTRAVENOUS at 07:02

## 2018-02-27 RX ADMIN — DEXAMETHASONE SODIUM PHOSPHATE 4 MG: 4 INJECTION, SOLUTION INTRAMUSCULAR; INTRAVENOUS at 08:02

## 2018-02-27 RX ADMIN — ROCURONIUM BROMIDE 30 MG: 10 INJECTION, SOLUTION INTRAVENOUS at 07:02

## 2018-02-27 RX ADMIN — SODIUM CHLORIDE, SODIUM GLUCONATE, SODIUM ACETATE, POTASSIUM CHLORIDE, MAGNESIUM CHLORIDE, SODIUM PHOSPHATE, DIBASIC, AND POTASSIUM PHOSPHATE: .53; .5; .37; .037; .03; .012; .00082 INJECTION, SOLUTION INTRAVENOUS at 07:02

## 2018-02-27 RX ADMIN — ONDANSETRON 4 MG: 2 INJECTION INTRAMUSCULAR; INTRAVENOUS at 08:02

## 2018-02-27 RX ADMIN — FENTANYL CITRATE 50 MCG: 50 INJECTION, SOLUTION INTRAMUSCULAR; INTRAVENOUS at 07:02

## 2018-02-27 RX ADMIN — OXYCODONE HYDROCHLORIDE AND ACETAMINOPHEN 1 TABLET: 10; 325 TABLET ORAL at 09:02

## 2018-02-27 RX ADMIN — SODIUM CHLORIDE 1000 ML: 0.9 INJECTION, SOLUTION INTRAVENOUS at 05:02

## 2018-02-27 RX ADMIN — PROPOFOL 50 MG: 10 INJECTION, EMULSION INTRAVENOUS at 07:02

## 2018-02-27 NOTE — PROGRESS NOTES
Pt does not want to have family or friend wait in rm w/her. Pt also refuses to give up her phone, glasses, and dentures.

## 2018-02-27 NOTE — ANESTHESIA POSTPROCEDURE EVALUATION
"Anesthesia Post Evaluation    Patient: Courtney CURRAN Reilly    Procedure(s) Performed: Procedure(s) (LRB):  REMOVAL-SPINAL NEUROSTIMULATOR (N/A)    Final Anesthesia Type: general  Patient location during evaluation: [facility]  Patient participation: Yes- Able to Participate  Level of consciousness: awake and alert and oriented  Post-procedure vital signs: reviewed and stable  Pain management: adequate  Airway patency: patent  PONV status at discharge: No PONV  Anesthetic complications: no      Cardiovascular status: stable  Respiratory status: unassisted, spontaneous ventilation and room air  Hydration status: euvolemic  Follow-up not needed.        Visit Vitals  BP (!) 145/85   Pulse 69   Temp 36.6 °C (97.9 °F) (Temporal)   Resp 15   Ht 5' 7" (1.702 m)   Wt 63.5 kg (140 lb)   SpO2 99%   BMI 21.93 kg/m²       Pain/Laila Score: Pain Assessment Performed: Yes (2/27/2018 10:45 AM)  Presence of Pain: denies (2/27/2018 10:45 AM)  Pain Rating Prior to Med Admin: 7 (2/27/2018  9:46 AM)  Pain Rating Post Med Admin: 0 (2/27/2018  5:21 AM)  Laila Score: 10 (2/27/2018 10:00 AM)      "

## 2018-02-27 NOTE — BRIEF OP NOTE
[facility]  Brief Operative Note    SUMMARY     Surgery Date: 2/27/2018     Surgeon(s) and Role:     * Jan Avila MD - Resident - Assisting     * Bernadette Thompson MD - Primary        Pre-op Diagnosis:  Chronic pain syndrome [G89.4]  Post laminectomy syndrome [M96.1]    Post-op Diagnosis:  Post-Op Diagnosis Codes:     * Chronic pain syndrome [G89.4]     * Post laminectomy syndrome [M96.1]    Procedure(s) (LRB):  REMOVAL-SPINAL NEUROSTIMULATOR (N/A)    Anesthesia: General    Description of Procedure: Thoracic SCS and right IPG removal      Estimated Blood Loss: * No values recorded between 2/27/2018  7:56 AM and 2/27/2018  9:02 AM *         Specimens:   Specimen (12h ago through future)    None

## 2018-02-27 NOTE — DISCHARGE SUMMARY
[facility]  Neurosurgery  Discharge Summary      Patient Name: Courtney Reilly  MRN: [id number]  Admission Date: 2/27/2018  Hospital Length of Stay: 0 days  Discharge Date and Time:  02/27/2018 9:11 AM  Attending Physician: Bernadette hTompson MD   Discharging Provider: Jan Avila MD  Primary Care Provider: Lalitha Mai MD     HPI: 59 yo female presented for elective total component removal of thoracic SCS which she tolerated well, she will be discharged home today with instructions to resume regular diet and follow up in outpatient clinic.    Procedure(s) (LRB):  REMOVAL-SPINAL NEUROSTIMULATOR (N/A)     Hospital Course: As above.    Consults:     Significant Diagnostic Studies: Labs: All labs within the past 24 hours have been reviewed    Pending Diagnostic Studies:     Procedure Component Value Units Date/Time    SURG FL Surgery Fluoro Less Than 1 Hour [[id number]]     Order Status:  Sent Lab Status:  No result         Final Active Diagnoses:    Diagnosis Date Noted POA    PRINCIPAL PROBLEM:  Chronic pain [G89.29] 02/27/2018 Yes      Problems Resolved During this Admission:    Diagnosis Date Noted Date Resolved POA      Discharged Condition: good    Disposition: Home or Self Care    Follow Up:  Follow-up Information     Bernadette Thompson MD In 2 weeks.    Specialty:  Neurosurgery  Why:  For wound re-check  Contact information:  [address] [place] [address] [id number] [phone]                 Patient Instructions:     Diet general     Call MD for:  temperature >100.4     Call MD for:  persistent nausea and vomiting     Call MD for:  severe uncontrolled pain     Call MD for:  difficulty breathing, headache or visual disturbances     Call MD for:  redness, tenderness, or signs of infection (pain, swelling, redness, odor or green/yellow discharge around incision site)     Call MD for:  hives     Call MD for:  persistent dizziness or light-headedness     Call MD for:  extreme fatigue     Remove dressing  in 48 hours       Medications:  Reconciled Home Medications:   Current Discharge Medication List      START taking these medications    Details   cephALEXin (KEFLEX) 500 MG capsule Take 1 capsule (500 mg total) by mouth every 6 (six) hours.  Qty: 16 capsule, Refills: 0      oxyCODONE-acetaminophen (PERCOCET) 5-325 mg per tablet Take 1 tablet by mouth every 4 (four) hours as needed for Pain.  Qty: 15 tablet, Refills: 0         CONTINUE these medications which have NOT CHANGED    Details   celecoxib (CELEBREX) 200 MG capsule Take 200 mg by mouth 2 (two) times daily.        cholecalciferol, vitamin D3, (VITAMIN D3) 1,000 unit capsule Take 1,000 Units by mouth once daily.      DULoxetine (CYMBALTA) 60 MG capsule Take 60 mg by mouth.      omeprazole (PRILOSEC) 20 MG capsule       QUETIAPINE FUMARATE (SEROQUEL ORAL) Take by mouth.      solifenacin (VESICARE) 5 MG tablet Take 10 mg by mouth once daily.      vitamin E 400 UNIT capsule Take 400 Units by mouth once daily.      zolpidem (AMBIEN) 10 mg Tab Take 5 mg by mouth nightly as needed.         STOP taking these medications       aspirin 81 MG Chew Comments:   Reason for Stopping:         hydrocodone-acetaminophen 10-325mg (NORCO)  mg Tab Comments:   Reason for Stopping:         naproxen (NAPROSYN) 500 MG tablet Comments:   Reason for Stopping:               Jan Avila MD  Neurosurgery  [facility]

## 2018-02-27 NOTE — ANESTHESIA RELEASE NOTE
"Anesthesia Release from PACU Note    Patient: Courtney CURRAN Reilly    Procedure(s) Performed: Procedure(s) (LRB):  REMOVAL-SPINAL NEUROSTIMULATOR (N/A)    Anesthesia type: GEN    Post pain: Adequate analgesia reported    Post assessment: no apparent anesthetic complications, tolerated procedure well and no evidence of recall    Post vital signs: BP (!) 145/85   Pulse 69   Temp 36.6 °C (97.9 °F) (Temporal)   Resp 15   Ht 5' 7" (1.702 m)   Wt 63.5 kg (140 lb)   SpO2 99%   BMI 21.93 kg/m²     Level of consciousness: awake, alert and oriented    Nausea/Vomiting: no nausea/no vomiting    Complications: none    Airway Patency: patent    Respiratory: unassisted, spontaneous ventilation, room air    Cardiovascular: stable and blood pressure at baseline    Hydration: euvolemic    "

## 2018-02-27 NOTE — TRANSFER OF CARE
"Anesthesia Transfer of Care Note    Patient: Courtney CURRAN Reilly    Procedure(s) Performed: Procedure(s) (LRB):  REMOVAL-SPINAL NEUROSTIMULATOR (N/A)    Patient location: PACU    Anesthesia Type: general    Transport from OR: Transported from OR on 6-10 L/min O2 by face mask with adequate spontaneous ventilation    Post pain: adequate analgesia    Post assessment: no apparent anesthetic complications    Post vital signs: stable    Level of consciousness: lethargic    Nausea/Vomiting: no nausea/vomiting    Complications: none    Transfer of care protocol was followed      Last vitals:   Visit Vitals  BP (!) 157/75 (BP Location: Left arm, Patient Position: Lying)   Pulse 95   Temp 35.7 °C (96.2 °F) (Axillary)   Resp 19   Ht 5' 7" (1.702 m)   Wt 63.5 kg (140 lb)   SpO2 100%   BMI 21.93 kg/m²     "

## 2018-02-27 NOTE — H&P
History and Physical  Neurosurgery    Admit Date: 2/27/2018  LOS: 0    Code Status: No Order     CC: <principal problem not specified>    SUBJECTIVE:     History of Present Illness: 57 yo female with pmh of multiple spinal surgeries and post-laminectomy syndrome s/p thoracic SCS and right IPG placement (2016) presenting for elective total component removal.    Pt is neurologically intact on exam.           OBJECTIVE:   Vital Signs (Most Recent):   Temp: 98.4 °F (36.9 °C) (02/27/18 0549)  Pulse: 73 (02/27/18 0549)  Resp: 18 (02/27/18 0549)  BP: (!) 150/84 (02/27/18 0549)  SpO2: 100 % (02/27/18 0549)    Vital Signs (24h Range):   Temp:  [98.4 °F (36.9 °C)] 98.4 °F (36.9 °C)  Pulse:  [73] 73  Resp:  [18] 18  SpO2:  [100 %] 100 %  BP: (150)/(84) 150/84      I & O (Last 24h):  No intake or output data in the 24 hours ending 02/27/18 0607    Physical Exam:  General: well developed, well nourished, no distress.   Head: normocephalic, atraumatic  Cervical Spine: No midline tenderness to palpation.  Thoracolumbosacral Spine: No midline tenderness to palpation.  GCS: Motor: 6/Verbal: 5/Eyes: 4 GCS Total: 15  Mental Status: Awake, Alert, Oriented x 4  Language: No aphasia  Speech: No dysarthria  Facial Droop: None   Cranial nerves: CN III-XII grossly intact.  Visual Fields: Intact.   Eyes: Pupils equal and reactive to light. Intact Conjugate horizontal and vertical pursuit. No nystagmus. No gaze deviation.   Pulmonary: No distress.  Sensory: No deficit.  Propioception: No deficit in 1st digit of toe bilaterally.  Rectal Tone: Not tested.  Drift: None.  Upper Extremity Ataxia: No Dysmetria Bilaterally  Lower Extremity Ataxia: Not tested.  Dysdiadochokinesia: Not tested.  Reflexes: 2+ patellar bilaterally.  Ellis: Absent  Clonus: Absent  Babinski: Absent  Romberg: Not Tested  Pulses: Brisk and symmetric radial, DP and tibial pulses.  Motor Strength:    Strength  Shoulder Abduction Elbow Extension Elbow Flexion Wrist Extension  Wrist Flexion Finger Opposition Finger Add Finger Abd   Upper: R 5/5 5/5 5/5 5/5 5/5 5/5 5/5 5/5    L 5/5 5/5 5/5 5/5 5/5 5/5 5/5 5/5     Hip Flexion Knee Extension Knee  Flexion Ankle Dflexion Ankle Pflexion EHL     Lower: R 5/5 5/5 5/5 5/5 5/5 5/5      L 5/5 5/5 5/5 5/5 5/5 5/5             Lines/Drains/Airway:          Nutrition/Tube Feeds:   Current Diet Order   Procedures    Diet NPO Except for: Medication     Order Specific Question:   Except for     Answer:   Medication       Labs:  ABG: No results for input(s): PH, PO2, PCO2, HCO3, POCSATURATED, BE in the last 24 hours.  BMP:No results for input(s): NA, K, CL, CO2, BUN, CREATININE, GLU, MG, PHOS in the last 24 hours.  LFT:   Lab Results   Component Value Date    AST 19 07/26/2014    ALT 15 07/26/2014    ALKPHOS 75 07/26/2014    BILITOT 0.4 07/26/2014    ALBUMIN 4.5 07/26/2014    PROT 7.2 07/26/2014     CBC:   Lab Results   Component Value Date    WBC 12.76 (H) 07/26/2014    HGB 13.6 07/26/2014    HCT 41.0 07/26/2014    MCV 93 07/26/2014     (H) 07/26/2014     Microbiology x 7d:   Microbiology Results (last 7 days)     ** No results found for the last 168 hours. **            ASSESSMENT/PLAN:   57 yo female with pmh of multiple spinal surgeries and post-laminectomy syndrome s/p thoracic SCS and right IPG placement (2016) presenting for elective total component removal.    --To OR for SCS and IPG removal    Jan Avila

## 2018-02-27 NOTE — DISCHARGE INSTRUCTIONS
Please follow ONLY the instructions that are checked below.    Activity Restrictions:  [x]  Return to work will be determined on an individual basis.  [x]  No lifting greater than 10 pounds.  [x]  Avoid bending and twisting the area of your surgery more than 45 degrees from neutral position in any direction.  [x]  No driving or operating machinery:  [x]  until cleared by your surgeon.  [x]  while taking narcotic pain medications or muscle relaxants.  [x]  Increase ambulation over the next 2 weeks so that you are walking 2 miles per day at 2 weeks post-operatively.  [x]  Walk on paved surfaces only. It is okay to walk up and down stairs while holding onto a side rail.  [x]  No sexual activity for 2-3 weeks.    Discharge Medication/Follow-up:  [x]  Please refer to discharge medication reconciliation form.  [x]  Do not take ANY non-steroidal anti-inflammatory drugs (NSAIDS), including the following: ibuprofen, naprosyn, Aleve, Advil, Indocin, Mobic, or Celebrex for:  [x]  4 weeks  []  8 weeks  []  6 months  [x]  Take docusate (Colace 100 mg): take one capsule a day as needed for constipation. You can get this over the counter.  [x]  Follow-up appointment:  [x]  10-14 days post-op for wound check by physician assistant/nurse  [x]  An appointment will be mailed to you.    Wound Care:  [x]  Remove dressing or bandaid in 2 days.  [x]  You may shower on the 2nd day after your surgery. Have the force of water hit you opposite from the incision. Pat the incision dry after your shower; do not scrub the incision.  [x]  You cannot take a bath until 8 weeks after surgery.  []  Apply bacitracin to incision twice a day for    more days.    Call your doctor or go to the Emergency Room for any signs of infection, including: increased redness, drainage, pain, or fever (temperature ?101.5 for 24 hours). Call your doctor or go to the Emergency Room if there are any localized neurological changes; problems with speech, vision, numbness,  tingling, weakness, or severe headache; or for other concerns.    Special Instructions:  [x]  No use of tobacco products.  [x]  Diet: Please eat a regular diet as tolerated.    Physicians need 3 days' notice for pain medicine to be refilled. Pain medicine will only be refilled between 8 AM and 5 PM, Monday through Friday, due to Food and Drug Administration regulation of documentation.    If you have any questions about this form, please call [phone].    Form No. 44716 (Revised 10/31/2013)

## 2018-03-05 NOTE — OP NOTE
DATE OF PROCEDURE:  02/27/2018    PREOPERATIVE DIAGNOSES:  Chronic pain syndrome and lumbar post-laminectomy   syndrome.    POSTOPERATIVE DIAGNOSES:  Chronic pain syndrome and lumbar post-laminectomy   syndrome.    OPERATIVE PROCEDURES:  1.  Removal of percutaneous spinal cord stimulator electrode.  2.  Removal of spinal cord stimulator pulse generator.    SURGEON:  Bernadette Thompson M.D.    ASSISTANT:  Jan Avila M.D. (RES)    ANESTHESIA:  General.    ESTIMATED BLOOD LOSS:  25 mL.    INDICATION FOR PROCEDURE:  Ms. Reilly is a 58-year-old female with a longstanding   history of chronic pain syndrome and lumbar post-laminectomy syndrome.  She has   a complicated past surgical history, which included two lumbar surgeries done   several years ago.  This did not help her pain.  She underwent an intrathecal   catheter and pain pump placement, which also not helped her pain and was   ultimately removed.  She then had a spinal cord stimulator trial and subsequent   placement of permanent percutaneous electrodes done in 2016.  She now states   that the spinal cord stimulator system never helped her pain.  Furthermore in   November 2017 when she turned the stimulator on, she felt an electrical shock.    For these reasons, she now wishes to have the stimulator removed.    OPERATIVE NOTE:  After obtaining informed consent, the patient was brought into   the Operating Room.  She was intubated and anesthetized by Anesthesia.    Preoperative antibiotics were administered.  The patient was placed prone on the   operating room table.  All appropriate pressure points were padded.  Her   previous midline back incision as well as left gluteal region incision were   identified and marked on the skin.  The patient was then prepped and draped in   the standard sterile fashion.  A 1% lidocaine with epinephrine was injected into   the skin.  Skin incision was made first in the left gluteal region pocket using   the #15 blade.  This was carried  down to the level of the battery pocket using   Bovie electrocautery.  The pulse generator was identified and elevated out of   the pocket.  The electrode wires going into the pulse generator were cut.  The   pulse generator was then passed off the field as specimen.  We then turned our   attention to the thoracic region incision.  Again, skin incision was made using   the #15 blade.  This was carried down to the level of the lumbodorsal fascia   using Bovie electrocautery.  The two anchoring booties were identified.  These   were freed from the surrounding scar tissue using Bovie electrocautery.  Once   the booties were freed, the epidural percutaneous electrodes were pulled free   from the epidural space without any resistance or problems.  The remainder of   the electrodes were then pulled free from the battery pocket without any   complications.  The entirety of the electrodes were passed off the field as   specimen.  During our dissection of the midline back incision, we found the   patient's old intrathecal catheter.  Although there was no CSF seen coming from   the intrathecal portion of the catheter.  We tied off the catheter end with two   2-0 silk sutures to ensure that the patient would not have any problems in the   future.  Both wounds were then copiously irrigated.  Hemostasis was obtained   using bipolar electrocautery and FloSeal.  The wounds were closed in layers.    Sterile dressings were put into place.  The patient was flipped back supine and   extubated by Anesthesia.  She was brought to the Recovery Room in stable   condition.  All counts were correct at the end of the case.      BRENNAN/PAMELA  dd: 03/04/2018 18:41:35 (CST)  td: 03/04/2018 21:27:32 (CST)  Doc ID   #[id number]  Job ID #[id number]    CC:

## 2018-03-15 ENCOUNTER — CLINICAL SUPPORT (OUTPATIENT)
Dept: NEUROSURGERY | Facility: CLINIC | Age: 58
End: 2018-03-15
Payer: MEDICARE

## 2018-03-15 VITALS
HEIGHT: 67 IN | HEART RATE: 82 BPM | TEMPERATURE: 99 F | SYSTOLIC BLOOD PRESSURE: 151 MMHG | BODY MASS INDEX: 22.44 KG/M2 | WEIGHT: 143 LBS | DIASTOLIC BLOOD PRESSURE: 99 MMHG

## 2018-03-15 PROCEDURE — 99999 PR PBB SHADOW E&M-EST. PATIENT-LVL III: CPT | Mod: PBBFAC,,,

## 2018-03-15 NOTE — PROGRESS NOTES
Patient seen in clinic for 2 week post op s/p SCS removal with Dr Thompson on 02/27/2018.  Pain is  Being controlled with pain management doctor      Incisions on back and right buttock assessed, dissolvable sutures used for closure no swelling or purulent drainage, edges well approximated.     Patient was instructed as follows:    Discontinue Bacitracin after tonight.   May shower normally but pat dry after shower.   Keep incision clean, dry and open to air as much as possible.   Patient encouraged to walk as much as possible but advised to walk with family member or friend and rest as necessary.   No lifting >10lbs.   Post-op instructions reviewed with emphasis on body mechanics.   Return to work will be determined on an individual basis.   No driving or operating machinery while taking narcotic pain medication or muscle relaxants and until cleared by a surgeon.    All questions were answered. Patient will follow up with Dr Thompson in April. Gave a copy of appt card     . Patient was encouraged to call clinic with any future concerns prior to follow up appt. If any worsening symptoms, patient should report to ED.       Simi Aleman RN, BSN  Neurosurgery

## 2018-04-02 ENCOUNTER — HOSPITAL ENCOUNTER (EMERGENCY)
Facility: HOSPITAL | Age: 58
Discharge: HOME | End: 2018-04-02
Attending: EMERGENCY MEDICINE
Payer: MEDICARE

## 2018-04-02 VITALS
OXYGEN SATURATION: 98 % | DIASTOLIC BLOOD PRESSURE: 78 MMHG | SYSTOLIC BLOOD PRESSURE: 136 MMHG | RESPIRATION RATE: 17 BRPM | HEART RATE: 62 BPM | TEMPERATURE: 98 F

## 2018-04-02 DIAGNOSIS — M54.6 ACUTE RIGHT-SIDED THORACIC BACK PAIN: Primary | ICD-10-CM

## 2018-04-02 DIAGNOSIS — R07.9 CHEST PAIN: ICD-10-CM

## 2018-04-02 LAB
ALBUMIN SERPL BCP-MCNC: 4.2 G/DL
ALP SERPL-CCNC: 74 U/L
ALT SERPL W/O P-5'-P-CCNC: 10 U/L
ANION GAP SERPL CALC-SCNC: 5 MMOL/L
APTT BLDCRRT: 26.9 SEC
AST SERPL-CCNC: 13 U/L
BASOPHILS # BLD AUTO: 0.04 K/UL
BASOPHILS NFR BLD: 0.4 %
BILIRUB SERPL-MCNC: 0.4 MG/DL
BNP SERPL-MCNC: 31 PG/ML
BUN SERPL-MCNC: 14 MG/DL
CALCIUM SERPL-MCNC: 9.9 MG/DL
CHLORIDE SERPL-SCNC: 108 MMOL/L
CO2 SERPL-SCNC: 29 MMOL/L
CREAT SERPL-MCNC: 1 MG/DL
DIFFERENTIAL METHOD: ABNORMAL
EOSINOPHIL # BLD AUTO: 0.3 K/UL
EOSINOPHIL NFR BLD: 2.7 %
ERYTHROCYTE [DISTWIDTH] IN BLOOD BY AUTOMATED COUNT: 13.7 %
EST. GFR  (AFRICAN AMERICAN): >60 ML/MIN/1.73 M^2
EST. GFR  (NON AFRICAN AMERICAN): >60 ML/MIN/1.73 M^2
GLUCOSE SERPL-MCNC: 92 MG/DL
HCT VFR BLD AUTO: 42.7 %
HGB BLD-MCNC: 14.6 G/DL
INR PPP: 1
LYMPHOCYTES # BLD AUTO: 3.7 K/UL
LYMPHOCYTES NFR BLD: 36.7 %
MAGNESIUM SERPL-MCNC: 2.3 MG/DL
MCH RBC QN AUTO: 32 PG
MCHC RBC AUTO-ENTMCNC: 34.2 G/DL
MCV RBC AUTO: 94 FL
MONOCYTES # BLD AUTO: 0.6 K/UL
MONOCYTES NFR BLD: 6 %
NEUTROPHILS # BLD AUTO: 5.4 K/UL
NEUTROPHILS NFR BLD: 54.2 %
PLATELET # BLD AUTO: 441 K/UL
PMV BLD AUTO: 8.9 FL
POTASSIUM SERPL-SCNC: 4.6 MMOL/L
PROT SERPL-MCNC: 7 G/DL
PROTHROMBIN TIME: 10.4 SEC
RBC # BLD AUTO: 4.56 M/UL
SODIUM SERPL-SCNC: 142 MMOL/L
TROPONIN I SERPL DL<=0.01 NG/ML-MCNC: <0.006 NG/ML
TROPONIN I SERPL DL<=0.01 NG/ML-MCNC: <0.006 NG/ML
WBC # BLD AUTO: 10.03 K/UL

## 2018-04-02 PROCEDURE — 93010 ELECTROCARDIOGRAM REPORT: CPT | Mod: ,,, | Performed by: INTERNAL MEDICINE

## 2018-04-02 PROCEDURE — 85025 COMPLETE CBC W/AUTO DIFF WBC: CPT

## 2018-04-02 PROCEDURE — 96374 THER/PROPH/DIAG INJ IV PUSH: CPT

## 2018-04-02 PROCEDURE — 93005 ELECTROCARDIOGRAM TRACING: CPT

## 2018-04-02 PROCEDURE — 80053 COMPREHEN METABOLIC PANEL: CPT

## 2018-04-02 PROCEDURE — 96376 TX/PRO/DX INJ SAME DRUG ADON: CPT

## 2018-04-02 PROCEDURE — 96372 THER/PROPH/DIAG INJ SC/IM: CPT | Mod: 59

## 2018-04-02 PROCEDURE — 83880 ASSAY OF NATRIURETIC PEPTIDE: CPT

## 2018-04-02 PROCEDURE — 25000003 PHARM REV CODE 250: Performed by: EMERGENCY MEDICINE

## 2018-04-02 PROCEDURE — 85730 THROMBOPLASTIN TIME PARTIAL: CPT

## 2018-04-02 PROCEDURE — 84484 ASSAY OF TROPONIN QUANT: CPT

## 2018-04-02 PROCEDURE — 85610 PROTHROMBIN TIME: CPT

## 2018-04-02 PROCEDURE — 96375 TX/PRO/DX INJ NEW DRUG ADDON: CPT

## 2018-04-02 PROCEDURE — 99284 EMERGENCY DEPT VISIT MOD MDM: CPT | Mod: 25

## 2018-04-02 PROCEDURE — 83735 ASSAY OF MAGNESIUM: CPT

## 2018-04-02 PROCEDURE — 63600175 PHARM REV CODE 636 W HCPCS: Performed by: EMERGENCY MEDICINE

## 2018-04-02 RX ORDER — MORPHINE SULFATE 10 MG/ML
5 INJECTION INTRAMUSCULAR; INTRAVENOUS; SUBCUTANEOUS
Status: COMPLETED | OUTPATIENT
Start: 2018-04-02 | End: 2018-04-02

## 2018-04-02 RX ORDER — BETAMETHASONE SODIUM PHOSPHATE AND BETAMETHASONE ACETATE 3; 3 MG/ML; MG/ML
12 INJECTION, SUSPENSION INTRA-ARTICULAR; INTRALESIONAL; INTRAMUSCULAR; SOFT TISSUE ONCE
Status: COMPLETED | OUTPATIENT
Start: 2018-04-02 | End: 2018-04-02

## 2018-04-02 RX ORDER — KETOROLAC TROMETHAMINE 30 MG/ML
30 INJECTION, SOLUTION INTRAMUSCULAR; INTRAVENOUS
Status: COMPLETED | OUTPATIENT
Start: 2018-04-02 | End: 2018-04-02

## 2018-04-02 RX ORDER — CYCLOBENZAPRINE HCL 10 MG
10 TABLET ORAL 3 TIMES DAILY PRN
Qty: 30 TABLET | Refills: 0 | Status: SHIPPED | OUTPATIENT
Start: 2018-04-02 | End: 2018-04-12

## 2018-04-02 RX ORDER — ONDANSETRON 2 MG/ML
8 INJECTION INTRAMUSCULAR; INTRAVENOUS
Status: DISCONTINUED | OUTPATIENT
Start: 2018-04-02 | End: 2018-04-02

## 2018-04-02 RX ORDER — ASPIRIN 325 MG
325 TABLET ORAL ONCE
Status: COMPLETED | OUTPATIENT
Start: 2018-04-02 | End: 2018-04-02

## 2018-04-02 RX ORDER — ONDANSETRON 8 MG/1
8 TABLET, ORALLY DISINTEGRATING ORAL
Status: COMPLETED | OUTPATIENT
Start: 2018-04-02 | End: 2018-04-02

## 2018-04-02 RX ORDER — MORPHINE SULFATE 10 MG/ML
3 INJECTION INTRAMUSCULAR; INTRAVENOUS; SUBCUTANEOUS
Status: COMPLETED | OUTPATIENT
Start: 2018-04-02 | End: 2018-04-02

## 2018-04-02 RX ADMIN — KETOROLAC TROMETHAMINE 30 MG: 30 INJECTION, SOLUTION INTRAMUSCULAR at 02:04

## 2018-04-02 RX ADMIN — MORPHINE SULFATE 3 MG: 10 INJECTION INTRAVENOUS at 02:04

## 2018-04-02 RX ADMIN — ASPIRIN 325 MG ORAL TABLET 325 MG: 325 PILL ORAL at 10:04

## 2018-04-02 RX ADMIN — NITROGLYCERIN 1 INCH: 20 OINTMENT TOPICAL at 10:04

## 2018-04-02 RX ADMIN — MORPHINE SULFATE 5 MG: 10 INJECTION INTRAVENOUS at 10:04

## 2018-04-02 RX ADMIN — BETAMETHASONE SODIUM PHOSPHATE AND BETAMETHASONE ACETATE 12 MG: 3; 3 INJECTION, SUSPENSION INTRA-ARTICULAR; INTRALESIONAL; INTRAMUSCULAR at 03:04

## 2018-04-02 RX ADMIN — ONDANSETRON 8 MG: 8 TABLET, ORALLY DISINTEGRATING ORAL at 10:04

## 2018-04-02 NOTE — DISCHARGE INSTRUCTIONS
Please follow-up with her cardiologist as scheduled.  Please return immediately if you get worse or if new problems develop.  Rest.  Flexeril for pain.

## 2018-04-02 NOTE — ED TRIAGE NOTES
Pt arrived via personal transport with c/o cp that radiates to her right side and crosses to the left; pt states these symptoms began this morning; pt denies any other symptoms at this time

## 2018-04-02 NOTE — ED PROVIDER NOTES
"Encounter Date: 4/2/2018    SCRIBE #1 NOTE: I, Jose Reynolds, am scribing for, and in the presence of,  Bob Mayes MD. I have scribed the following portions of the note - Other sections scribed: HPI, ROS.       History     Chief Complaint   Patient presents with    Chest Pain     started this morning at 0900; states "I think I'm having a heart attack." hx of HTN and strokes     CC: Chest Pain    58 year old female  has a past medical history of Arthritis; Chronic back pain; Encounter for blood transfusion; Fatigue; Fibromyalgia; GERD (gastroesophageal reflux disease); Herniated disc; Multiple sclerosis; Ovarian tumor; Radiculopathy; Restless legs syndrome (RLS); Spasticity; Stroke; and TIA (transient ischemic attack) presents to the ED for evaluation of right upper arm pain radiating across back to L shoulder that began at 910 am. Pt also reports intermittent episodes of shortness of breath, nausea, and feeling like "she was going to pass out." Pt does not report shortness of breath at the time of exam. She denies associated chest tightness/pressure. She also denies abdominal pain, dysuria, sore throat, fever, chills, and other associated symptoms. Pt denies any residual deficits following her 2 strokes. She smokes but does not drink or use drugs. Pt states that she has had a "problem with my blood pressure lately" and has an appointment with Cardiologist, Dr. Aguirre, this month. She is not on medications for hypertension. No other symptoms reported. No prior attempts at medication reported.     Surgical History: Tonsillectomy, Wrist Surgery, Knee Surgery, Pain pump implant, Hysterectomy, Spine Surgery, Cerebral aneurysm repair, IL removal of Ovary/Tube.       The history is provided by the patient. No  was used.     Review of patient's allergies indicates:   Allergen Reactions    Bee pollens     Mushroom flavor      Past Medical History:   Diagnosis Date    Arthritis     Chronic " back pain     Encounter for blood transfusion     Fatigue     Fibromyalgia     GERD (gastroesophageal reflux disease)     Herniated disc     Multiple sclerosis     Ovarian tumor     Radiculopathy     Restless legs syndrome (RLS)     Spasticity     Stroke     twice    TIA (transient ischemic attack)      Past Surgical History:   Procedure Laterality Date    CEREBRAL ANEURYSM REPAIR      HYSTERECTOMY      age 27    KNEE SURGERY  2008    left    pain pump implant      IA REMOVAL OF OVARY/TUBE(S)      SPINE SURGERY  2006      2 on neck.  2 on lumbar spine    TONSILLECTOMY      WRIST SURGERY  2007    right     Family History   Problem Relation Age of Onset    Cancer Mother      Lung cancer    Cancer Father      Lung cancer     Social History   Substance Use Topics    Smoking status: Current Every Day Smoker     Packs/day: 0.50     Years: 30.00     Types: Cigarettes    Smokeless tobacco: Never Used      Comment: .    Occup:   disabled.   , lives alone.      Alcohol use Yes      Comment: socially     Review of Systems   Constitutional: Negative for fever.   HENT: Negative for sore throat.    Respiratory: Positive for shortness of breath (intermittent; none at the time of exam).    Cardiovascular: Negative for chest pain.   Gastrointestinal: Positive for nausea. Negative for diarrhea and vomiting.   Genitourinary: Negative for dysuria.   Musculoskeletal: Negative for back pain.        (+) right upper arm pain that radiates to back and into left shoulder   Skin: Negative for rash.   Neurological: Negative for weakness.   Hematological: Does not bruise/bleed easily.       Physical Exam     Initial Vitals [18 1008]   BP Pulse Resp Temp SpO2   121/76 (!) 144 (!) 22 -- 100 %      MAP       91         Physical Exam  The patient was examined specifically for the following:   General:No significant distress, Good color, Warm and dry. Head and neck:Scalp atraumatic, Neck supple.  Neurological:Appropriate conversation, Gross motor deficits. Eyes:Conjugate gaze, Clear corneas. ENT: No epistaxis. Cardiac: Regular rate and rhythm, Grossly normal heart tones. Pulmonary: Wheezing, Rales. Gastrointestinal: Abdominal tenderness, Abdominal distention. Musculoskeletal: Extremity deformity, Apparent pain with range of motion of the joints. Skin: Rash.   The findings on examination were normal except for the following: She was tachycardic at 144 arrival.  Her heart rate is now 86.  The lungs are clear.  The heart tones are normal.  The abdomen is soft.  There is no clinical evidence of respiratory distress.  There is no significant tenderness or pain range of motion of the thoracic back shoulders or upper arms.  The abdomen is soft.  ED Course   Procedures  Labs Reviewed   COMPREHENSIVE METABOLIC PANEL - Abnormal; Notable for the following:        Result Value    Anion Gap 5 (*)     All other components within normal limits    Narrative:     Recoll. [id number] by TWW at 04/02/2018 11:15, reason: Specimen   hemolyzed  Tube has been discarded   CBC W/ AUTO DIFFERENTIAL - Abnormal; Notable for the following:     MCH 32.0 (*)     Platelets 441 (*)     MPV 8.9 (*)     All other components within normal limits   APTT   PROTIME-INR   TROPONIN I    Narrative:     Recoll. [id number] by TWW at 04/02/2018 11:15, reason: Specimen   hemolyzed  Tube has been discarded   B-TYPE NATRIURETIC PEPTIDE    Narrative:     Recoll. [id number] by TWW at 04/02/2018 11:15, reason: Specimen   hemolyzed  Tube has been discarded   MAGNESIUM    Narrative:     Recoll. [id number] by TWW at 04/02/2018 11:15, reason: Specimen   hemolyzed  Tube has been discarded   TROPONIN I     EKG Readings: (Independently Interpreted)   This patient is in a sinus tachycardia with a heart rate of 107.  The IL QRS and QT intervals are normal.  There is no evidence of acute myocardial infarction or malignant arrhythmia.  The patient has a rightward  axis.       X-Rays:   Independently Interpreted Readings:   Other Readings:  Chest x-ray fails to reveal pneumothorax pneumonia pleural effusion.    Medical decision making: Given the above, this patient presented emergency room with some episodes of weakness some right shoulder pain some pain across the high thoracic back to right scapular back.  This pain gets better and worse.  Limits at its worst, it is sharp and worse with movement.  The patient has no chest pain pressure tightness she was concerned about myocardial infarction.  2 troponins done in the emergency room and negative.  Patient had no chest pain pressure tightness.  There is some history of tachycardia.  The patient's heart rate right now is 60.  The patient is in no distress.  I will try treating her with steroids for her muscular skeletal relation her shoulder.  I considered thoracic aortic aneurysm but this patient is fairly young and there is no hypertension.  I will have her follow-up primary care.              Scribe Attestation:   Scribe #1: I performed the above scribed service and the documentation accurately describes the services I performed. I attest to the accuracy of the note.    Attending Attestation:           Physician Attestation for Scribe:  Physician Attestation Statement for Scribe #1: I, Bob Mayes MD, reviewed documentation, as scribed by Jose Reynolds in my presence, and it is both accurate and complete.                    Clinical Impression:   The primary encounter diagnosis was Acute right-sided thoracic back pain. A diagnosis of Chest pain was also pertinent to this visit.                           Bob Mayes MD  04/02/18 [id number]

## 2018-04-02 NOTE — ED NOTES
Pt reports chest pain. Brought immediately to room 06. Dipika PCT hooking patient up on monitor. Nurse Alida Nino made aware of patient and need for ECG.

## 2018-04-05 ENCOUNTER — TELEPHONE (OUTPATIENT)
Dept: NEUROSURGERY | Facility: CLINIC | Age: 58
End: 2018-04-05

## 2018-04-05 NOTE — TELEPHONE ENCOUNTER
----- Message from Kaye Mendes sent at 4/5/2018 12:41 PM CDT -----  Contact: PT   PT called in regarding post op appointment on Monday 4/9  PT requested to cancel the appointment, which I've taken care of.   Just wanted to inform you all the appointment has been cancelled      -Kaye

## 2018-04-09 ENCOUNTER — OFFICE VISIT (OUTPATIENT)
Dept: CARDIOLOGY | Facility: CLINIC | Age: 58
End: 2018-04-09
Payer: MEDICARE

## 2018-04-09 VITALS
RESPIRATION RATE: 15 BRPM | DIASTOLIC BLOOD PRESSURE: 86 MMHG | HEIGHT: 67 IN | SYSTOLIC BLOOD PRESSURE: 165 MMHG | WEIGHT: 138 LBS | BODY MASS INDEX: 21.66 KG/M2 | OXYGEN SATURATION: 98 % | HEART RATE: 78 BPM

## 2018-04-09 DIAGNOSIS — I10 ESSENTIAL HYPERTENSION: Primary | ICD-10-CM

## 2018-04-09 DIAGNOSIS — G89.29 CHRONIC LOW BACK PAIN, UNSPECIFIED BACK PAIN LATERALITY, WITH SCIATICA PRESENCE UNSPECIFIED: ICD-10-CM

## 2018-04-09 DIAGNOSIS — Z86.73 HX-TIA (TRANSIENT ISCHEMIC ATTACK): ICD-10-CM

## 2018-04-09 DIAGNOSIS — G89.4 CHRONIC PAIN SYNDROME: ICD-10-CM

## 2018-04-09 DIAGNOSIS — M54.5 CHRONIC LOW BACK PAIN, UNSPECIFIED BACK PAIN LATERALITY, WITH SCIATICA PRESENCE UNSPECIFIED: ICD-10-CM

## 2018-04-09 DIAGNOSIS — Z72.0 TOBACCO ABUSE: ICD-10-CM

## 2018-04-09 PROCEDURE — 3077F SYST BP >= 140 MM HG: CPT | Mod: CPTII,S$GLB,, | Performed by: INTERNAL MEDICINE

## 2018-04-09 PROCEDURE — 3079F DIAST BP 80-89 MM HG: CPT | Mod: CPTII,S$GLB,, | Performed by: INTERNAL MEDICINE

## 2018-04-09 PROCEDURE — 99999 PR PBB SHADOW E&M-EST. PATIENT-LVL IV: CPT | Mod: PBBFAC,,, | Performed by: INTERNAL MEDICINE

## 2018-04-09 PROCEDURE — 99204 OFFICE O/P NEW MOD 45 MIN: CPT | Mod: S$GLB,,, | Performed by: INTERNAL MEDICINE

## 2018-04-09 RX ORDER — HYDROCHLOROTHIAZIDE 25 MG/1
25 TABLET ORAL DAILY
Qty: 90 TABLET | Refills: 3 | Status: SHIPPED | OUTPATIENT
Start: 2018-04-09 | End: 2018-05-21

## 2018-04-09 RX ORDER — HYDROCODONE BITARTRATE AND ACETAMINOPHEN 7.5; 325 MG/1; MG/1
1 TABLET ORAL EVERY 6 HOURS PRN
Status: ON HOLD | COMMUNITY
End: 2022-02-15 | Stop reason: SDUPTHER

## 2018-04-09 NOTE — PROGRESS NOTES
CARDIOVASCULAR CONSULTATION    REASON FOR CONSULT:   Courtney Reilly is a 58 y.o. female who presents for eval of HTN.    PCP: Pau  HISTORY OF PRESENT ILLNESS:   The patient is pleasant 58-year-old  woman who is self-referred for evaluation of recent onset hypertension.  She denies any chest pain, shortness breath, palpitations, lightheadedness, dizziness, or syncope.  There's been no PND, orthopnea, or lower extremity edema.  She denies melena, hematuria, or claudicant symptoms.  She does report a prior history of multiple TIA, last at age 44, also with one at age 37.    Family history appears to be notable for father with myocardial infarction in his 60s after a suffering from lung cancer and mantle radiation.    The patient is a current smoker, with multiple prior attempts to quit smoking.  She appears amenable to the ambulatory smoking cessation program.    CARDIOVASCULAR HISTORY:   TIA    PAST MEDICAL HISTORY:     Past Medical History:   Diagnosis Date    Arthritis     Chronic back pain     Encounter for blood transfusion     Fatigue     Fibromyalgia     GERD (gastroesophageal reflux disease)     Herniated disc     Multiple sclerosis     Ovarian tumor     Radiculopathy     Restless legs syndrome (RLS)     Spasticity     Stroke     twice    TIA (transient ischemic attack)        PAST SURGICAL HISTORY:     Past Surgical History:   Procedure Laterality Date    CEREBRAL ANEURYSM REPAIR      HYSTERECTOMY      age 27    KNEE SURGERY  2008    left    pain pump implant      [place] REMOVAL OF OVARY/TUBE(S)      SPINE SURGERY  2006      2 on neck.  2 on lumbar spine    TONSILLECTOMY      WRIST SURGERY  2007    right       ALLERGIES AND MEDICATION:     Review of patient's allergies indicates:   Allergen Reactions    Bee pollens     Mushroom flavor      Previous Medications    CELECOXIB (CELEBREX) 200 MG CAPSULE    Take 200 mg by mouth 2 (two) times daily.      CHOLECALCIFEROL, VITAMIN D3,  (VITAMIN D3) 1,000 UNIT CAPSULE    Take 1,000 Units by mouth once daily.    CYCLOBENZAPRINE (FLEXERIL) 10 MG TABLET    Take 1 tablet (10 mg total) by mouth 3 (three) times daily as needed for Muscle spasms.    DULOXETINE (CYMBALTA) 60 MG CAPSULE    Take 60 mg by mouth.    HYDROCODONE-ACETAMINOPHEN 7.5-325MG (NORCO) 7.5-325 MG PER TABLET    Take 1 tablet by mouth every 6 (six) hours as needed for Pain.    OMEPRAZOLE (PRILOSEC) 20 MG CAPSULE        QUETIAPINE FUMARATE (SEROQUEL ORAL)    Take by mouth.    SOLIFENACIN (VESICARE) 5 MG TABLET    Take 10 mg by mouth once daily.    VITAMIN E 400 UNIT CAPSULE    Take 400 Units by mouth once daily.    ZOLPIDEM (AMBIEN) 10 MG TAB    Take 5 mg by mouth nightly as needed.       SOCIAL HISTORY:     Social History     Social History    Marital status:      Spouse name: N/A    Number of children: N/A    Years of education: N/A     Occupational History    Not on file.     Social History Main Topics    Smoking status: Current Every Day Smoker     Packs/day: 0.50     Years: 30.00     Types: Cigarettes    Smokeless tobacco: Never Used      Comment: .    Occup:   disabled.   , lives alone.      Alcohol use Yes      Comment: socially    Drug use: No    Sexual activity: No     Other Topics Concern    Not on file     Social History Narrative    No narrative on file       FAMILY HISTORY:     Family History   Problem Relation Age of Onset    Cancer Mother      Lung cancer    Cancer Father      Lung cancer       REVIEW OF SYSTEMS:   Review of Systems   Constitutional: Negative for chills, diaphoresis and fever.   HENT: Negative for nosebleeds.    Eyes: Negative for blurred vision, double vision and photophobia.   Respiratory: Negative for hemoptysis, shortness of breath and wheezing.    Cardiovascular: Negative for chest pain, palpitations, orthopnea, claudication, leg swelling and PND.   Gastrointestinal: Negative for abdominal pain, blood in stool,  "heartburn, melena, nausea and vomiting.   Genitourinary: Negative for flank pain and hematuria.   Musculoskeletal: Negative for falls, myalgias and neck pain.   Skin: Negative for rash.   Neurological: Negative for dizziness, seizures, loss of consciousness, weakness and headaches.   Endo/Heme/Allergies: Negative for polydipsia. Does not bruise/bleed easily.   Psychiatric/Behavioral: Negative for depression and memory loss. The patient is not nervous/anxious.        PHYSICAL EXAM:     Vitals:    04/09/18 1328   BP: (!) 165/86   Pulse: 78   Resp: 15    Body mass index is 21.61 kg/m².  Weight: 62.6 kg (138 lb)   Height: 5' 7" (170.2 cm)     Physical Exam   Constitutional: She is oriented to person, place, and time. She appears well-developed and well-nourished. She is cooperative.  Non-toxic appearance. No distress.   HENT:   Head: Normocephalic and atraumatic.   Eyes: Conjunctivae and EOM are normal. Pupils are equal, round, and reactive to light. No scleral icterus.   Neck: Trachea normal. Neck supple. Normal carotid pulses and no JVD present. Carotid bruit is not present. No neck rigidity. No edema present. No thyromegaly present.   Cardiovascular: Normal rate, regular rhythm, S1 normal and S2 normal.  PMI is not displaced.  Exam reveals no gallop and no friction rub.    No murmur heard.  Pulses:       Carotid pulses are 2+ on the right side, and 2+ on the left side.  Pulmonary/Chest: Effort normal and breath sounds normal. No respiratory distress. She has no wheezes. She has no rales. She exhibits no tenderness.   Abdominal: Soft. Bowel sounds are normal. She exhibits no distension. There is no hepatosplenomegaly.   Musculoskeletal: She exhibits no edema or tenderness.   Feet:   Right Foot:   Skin Integrity: Negative for ulcer.   Left Foot:   Skin Integrity: Negative for ulcer.   Neurological: She is alert and oriented to person, place, and time. No cranial nerve deficit.   Skin: Skin is warm and dry. No rash " noted. No erythema.   Psychiatric: She has a normal mood and affect. Her speech is normal and behavior is normal.   Vitals reviewed.      DATA:   EKG: (personally reviewed tracing)  4/2/18     Laboratory:  CBC:    Recent Labs  Lab 04/02/18  1028   WHITE BLOOD CELL COUNT 10.03   HEMOGLOBIN 14.6   HEMATOCRIT 42.7   PLATELETS 441 H       CHEMISTRIES:    Recent Labs  Lab 04/02/18  1133   GLUCOSE 92   SODIUM 142   POTASSIUM 4.6   BUN BLD 14   CREATININE 1.0   EGFR IF  >60   EGFR IF NON- >60   CALCIUM 9.9   MAGNESIUM 2.3       CARDIAC BIOMARKERS:    Recent Labs  Lab 04/02/18  1133 04/02/18  1440   TROPONIN I <0.006 <0.006       COAGS:    Recent Labs  Lab 02/27/18  0525 04/02/18  1028   INR 0.9 1.0       LIPIDS/LFTS:    Recent Labs  Lab 04/02/18  1133   AST 13   ALT 10       Cardiovascular Testing:  Ordered  Carotid US  Echo with bubble study  Renal art US    ASSESSMENT:   # HTN, ?recent onset  # Tob abuse  # chr back pain  # Hx TIA, recurrent (last at age 44)    PLAN:   Cont med rx  Attempt to stop Celebrex  OK to use APAP up to 3g/daily (2g in addition to Norco rx already ongoing)  Start HCTZ 25mg qd  Check BMP/Lipids 2 weeks  Carotid US  Echo with bubble study  Renal art US  Smoking cessation program  RTC 4 weeks for review of above      Robert Aguirre MD, FACC

## 2018-04-20 ENCOUNTER — HOSPITAL ENCOUNTER (OUTPATIENT)
Dept: CARDIOLOGY | Facility: HOSPITAL | Age: 58
Discharge: HOME | End: 2018-04-20
Attending: INTERNAL MEDICINE
Payer: MEDICARE

## 2018-04-20 DIAGNOSIS — Z86.73 HX-TIA (TRANSIENT ISCHEMIC ATTACK): ICD-10-CM

## 2018-04-20 DIAGNOSIS — I10 ESSENTIAL HYPERTENSION: ICD-10-CM

## 2018-04-20 LAB
DIASTOLIC DYSFUNCTION: NO
ESTIMATED PA SYSTOLIC PRESSURE: 32.59
GLOBAL PERICARDIAL EFFUSION: NORMAL
INTERNAL CAROTID STENOSIS: NORMAL
MITRAL VALVE MOBILITY: NORMAL
RETIRED EF AND QEF - SEE NOTES: 55 (ref 55–65)
TRICUSPID VALVE REGURGITATION: NORMAL

## 2018-04-20 PROCEDURE — 93306 TTE W/DOPPLER COMPLETE: CPT | Mod: 26,,, | Performed by: INTERNAL MEDICINE

## 2018-04-20 PROCEDURE — 93880 EXTRACRANIAL BILAT STUDY: CPT | Mod: 26,,, | Performed by: INTERNAL MEDICINE

## 2018-04-20 PROCEDURE — 93975 VASCULAR STUDY: CPT

## 2018-04-20 PROCEDURE — 93880 EXTRACRANIAL BILAT STUDY: CPT

## 2018-04-20 PROCEDURE — 93975 VASCULAR STUDY: CPT | Mod: 26,,, | Performed by: INTERNAL MEDICINE

## 2018-04-20 PROCEDURE — 96374 THER/PROPH/DIAG INJ IV PUSH: CPT

## 2018-04-20 NOTE — NURSING
24g PIV inserted into right FA. 8cc agitated saline given IVP x 2 for bubble study. Pt tolerated well. IV d/c

## 2018-05-21 ENCOUNTER — OFFICE VISIT (OUTPATIENT)
Dept: CARDIOLOGY | Facility: CLINIC | Age: 58
End: 2018-05-21
Payer: MEDICARE

## 2018-05-21 VITALS
HEIGHT: 67 IN | HEART RATE: 86 BPM | RESPIRATION RATE: 15 BRPM | BODY MASS INDEX: 22 KG/M2 | SYSTOLIC BLOOD PRESSURE: 127 MMHG | WEIGHT: 140.19 LBS | DIASTOLIC BLOOD PRESSURE: 73 MMHG | OXYGEN SATURATION: 98 %

## 2018-05-21 DIAGNOSIS — E78.2 MIXED HYPERLIPIDEMIA: ICD-10-CM

## 2018-05-21 DIAGNOSIS — Z86.73 HX-TIA (TRANSIENT ISCHEMIC ATTACK): Primary | ICD-10-CM

## 2018-05-21 DIAGNOSIS — I10 ESSENTIAL HYPERTENSION: ICD-10-CM

## 2018-05-21 DIAGNOSIS — Z72.0 TOBACCO ABUSE: ICD-10-CM

## 2018-05-21 PROCEDURE — 3008F BODY MASS INDEX DOCD: CPT | Mod: CPTII,S$GLB,, | Performed by: INTERNAL MEDICINE

## 2018-05-21 PROCEDURE — 99214 OFFICE O/P EST MOD 30 MIN: CPT | Mod: S$GLB,,, | Performed by: INTERNAL MEDICINE

## 2018-05-21 PROCEDURE — 3074F SYST BP LT 130 MM HG: CPT | Mod: CPTII,S$GLB,, | Performed by: INTERNAL MEDICINE

## 2018-05-21 PROCEDURE — 3078F DIAST BP <80 MM HG: CPT | Mod: CPTII,S$GLB,, | Performed by: INTERNAL MEDICINE

## 2018-05-21 PROCEDURE — 99999 PR PBB SHADOW E&M-EST. PATIENT-LVL III: CPT | Mod: PBBFAC,,, | Performed by: INTERNAL MEDICINE

## 2018-05-21 RX ORDER — ATORVASTATIN CALCIUM 40 MG/1
40 TABLET, FILM COATED ORAL NIGHTLY
Qty: 90 TABLET | Refills: 3 | Status: SHIPPED | OUTPATIENT
Start: 2018-05-21 | End: 2018-12-20

## 2018-05-21 NOTE — PROGRESS NOTES
CARDIOVASCULAR CONSULTATION    REASON FOR CONSULT:   Courtney Reilly is a 58 y.o. female who presents for f/u of HTN, hx mult TIA.    PCP: Pau  HISTORY OF PRESENT ILLNESS:   The patient returns for follow-up.  She denies intercurrent angina or dyspnea.  She's had no palpitations, lightheadedness, dizziness, or syncope.  She denies PND, orthopnea, or lower extremity edema.  There's been no melena, hematuria, or claudicant symptoms.  She denies any symptoms of recurrent TIA.  Unfortunately, it appears she was unable to tolerate the hydrochlorothiazide, but her blood pressures controlled on evaluation in the office today.  She does continue to smoke cigarettes, but is cutting down.    I reviewed the results of her carotid ultrasound, renal arterial ultrasound and echo with bubble study, these were all normal.    CARDIOVASCULAR HISTORY:   TIA    PAST MEDICAL HISTORY:     Past Medical History:   Diagnosis Date    Arthritis     Chronic back pain     Encounter for blood transfusion     Fatigue     Fibromyalgia     GERD (gastroesophageal reflux disease)     Herniated disc     Multiple sclerosis     Ovarian tumor     Radiculopathy     Restless legs syndrome (RLS)     Spasticity     Stroke     twice    TIA (transient ischemic attack)        PAST SURGICAL HISTORY:     Past Surgical History:   Procedure Laterality Date    CEREBRAL ANEURYSM REPAIR      HYSTERECTOMY      age 27    KNEE SURGERY  2008    left    pain pump implant      [place] REMOVAL OF OVARY/TUBE(S)      SPINE SURGERY  2006      2 on neck.  2 on lumbar spine    TONSILLECTOMY      WRIST SURGERY  2007    right       ALLERGIES AND MEDICATION:     Review of patient's allergies indicates:   Allergen Reactions    Bee pollens     Mushroom flavor      Previous Medications    CELECOXIB (CELEBREX) 200 MG CAPSULE    Take 200 mg by mouth 2 (two) times daily.      CHOLECALCIFEROL, VITAMIN D3, (VITAMIN D3) 1,000 UNIT CAPSULE    Take 1,000 Units by mouth once  daily.    HYDROCHLOROTHIAZIDE (HYDRODIURIL) 25 MG TABLET    Take 1 tablet (25 mg total) by mouth once daily.    HYDROCODONE-ACETAMINOPHEN 7.5-325MG (NORCO) 7.5-325 MG PER TABLET    Take 1 tablet by mouth every 6 (six) hours as needed for Pain.    OMEPRAZOLE (PRILOSEC) 20 MG CAPSULE        QUETIAPINE FUMARATE (SEROQUEL ORAL)    Take by mouth.    SOLIFENACIN (VESICARE) 5 MG TABLET    Take 10 mg by mouth once daily.    VITAMIN E 400 UNIT CAPSULE    Take 400 Units by mouth once daily.    ZOLPIDEM (AMBIEN) 10 MG TAB    Take 5 mg by mouth nightly as needed.       SOCIAL HISTORY:     Social History     Social History    Marital status:      Spouse name: N/A    Number of children: N/A    Years of education: N/A     Occupational History    Not on file.     Social History Main Topics    Smoking status: Current Every Day Smoker     Packs/day: 0.50     Years: 30.00     Types: Cigarettes    Smokeless tobacco: Never Used      Comment: .    Occup:   disabled.   , lives alone.      Alcohol use Yes      Comment: socially    Drug use: No    Sexual activity: No     Other Topics Concern    Not on file     Social History Narrative    No narrative on file       FAMILY HISTORY:     Family History   Problem Relation Age of Onset    Cancer Mother         Lung cancer    Cancer Father         Lung cancer       REVIEW OF SYSTEMS:   Review of Systems   Constitutional: Negative for chills, diaphoresis and fever.   HENT: Negative for nosebleeds.    Eyes: Negative for blurred vision, double vision and photophobia.   Respiratory: Negative for hemoptysis, shortness of breath and wheezing.    Cardiovascular: Negative for chest pain, palpitations, orthopnea, claudication, leg swelling and PND.   Gastrointestinal: Negative for abdominal pain, blood in stool, heartburn, melena, nausea and vomiting.   Genitourinary: Negative for flank pain and hematuria.   Musculoskeletal: Negative for falls, myalgias and neck pain.  "  Skin: Negative for rash.   Neurological: Negative for dizziness, seizures, loss of consciousness, weakness and headaches.   Endo/Heme/Allergies: Negative for polydipsia. Does not bruise/bleed easily.   Psychiatric/Behavioral: Negative for depression and memory loss. The patient is not nervous/anxious.        PHYSICAL EXAM:     Vitals:    05/21/18 1258   BP: 127/73   Pulse: 86   Resp: 15    Body mass index is 21.96 kg/m².  Weight: 63.6 kg (140 lb 3.4 oz)   Height: 5' 7" (170.2 cm)     Physical Exam   Constitutional: She is oriented to person, place, and time. She appears well-developed and well-nourished. She is cooperative.  Non-toxic appearance. No distress.   HENT:   Head: Normocephalic and atraumatic.   Eyes: Conjunctivae and EOM are normal. Pupils are equal, round, and reactive to light. No scleral icterus.   Neck: Trachea normal. Neck supple. Normal carotid pulses and no JVD present. Carotid bruit is not present. No neck rigidity. No edema present. No thyromegaly present.   Cardiovascular: Normal rate, regular rhythm, S1 normal and S2 normal.  PMI is not displaced.  Exam reveals no gallop and no friction rub.    No murmur heard.  Pulses:       Carotid pulses are 2+ on the right side, and 2+ on the left side.  Pulmonary/Chest: Effort normal and breath sounds normal. No respiratory distress. She has no wheezes. She has no rales. She exhibits no tenderness.   Abdominal: Soft. Bowel sounds are normal. She exhibits no distension. There is no hepatosplenomegaly.   Musculoskeletal: She exhibits no edema or tenderness.   Feet:   Right Foot:   Skin Integrity: Negative for ulcer.   Left Foot:   Skin Integrity: Negative for ulcer.   Neurological: She is alert and oriented to person, place, and time. No cranial nerve deficit.   Skin: Skin is warm and dry. No rash noted. No erythema.   Psychiatric: She has a normal mood and affect. Her speech is normal and behavior is normal.   Vitals reviewed.      DATA:   EKG: " (personally reviewed tracing)  4/2/18     Laboratory:  CBC:    Recent Labs  Lab 04/02/18  1028   WHITE BLOOD CELL COUNT 10.03   HEMOGLOBIN 14.6   HEMATOCRIT 42.7   PLATELETS 441 H       CHEMISTRIES:    Recent Labs  Lab 04/02/18  1133 04/20/18  0822   GLUCOSE 92 88   SODIUM 142 139   POTASSIUM 4.6 3.9   BUN BLD 14 22 H   CREATININE 1.0 0.9   EGFR IF  >60 >60   EGFR IF NON- >60 >60   CALCIUM 9.9 9.8   MAGNESIUM 2.3  --        CARDIAC BIOMARKERS:    Recent Labs  Lab 04/02/18  1133 04/02/18  1440   TROPONIN I <0.006 <0.006       COAGS:    Recent Labs  Lab 02/27/18  0525 04/02/18  1028   INR 0.9 1.0       LIPIDS/LFTS:    Recent Labs  Lab 04/02/18  1133 04/20/18  0822   CHOLESTEROL  --  231 H   TRIGLYCERIDES  --  161 H   HDL  --  57   LDL CHOLESTEROL  --  141.8   NON-HDL CHOLESTEROL  --  174   AST 13  --    ALT 10  --        Cardiovascular Testing:  Carotid US 4/20/18  There is 20 - 39% right Internal Carotid stenosis.  There is 20 - 39% left Internal Carotid stenosis.    Echo with bubble study 4/20/18    1 - Normal left ventricular systolic function (EF 55-60%).     2 - No wall motion abnormalities.     3 - Concentric remodeling.     4 - Trivial tricuspid regurgitation.     5 - The estimated PA systolic pressure is 33 mmHg.     6 - No evidence of intracardiac shunt (negative bubble study).     7 - No intracardiac source of embolus noted on this exam.  If high clinical suspicion, consider MARIA D +/- bubble study.     Renal art US 4/20/18  There is insignificant stenosis (0-59%) in the Right renal artery.   Right kidney 11.4 cm.  There is insignificant stenosis (0-59%) in the Left renal artery.   Left kidney 11.9 cm.     ASSESSMENT:   # HTN, controlled off meds, FRANDY US neg 4/2018.  Pt intol of HCTZ.    # Tob abuse  # chr back pain  # HLP  # Hx TIA, recurrent (last at age 44), carotid and echo/bubble 4/2018 neg    PLAN:   Cont med rx  Start atorva 40mg qhs  Cont efforts at quitting  smoking, smoking cessation program  RTC 3 months with lipid/LFT (mid Aug 2018)  Consider EVM/ILR if recurrent TIA      Robert Aguirre MD, FACC

## 2018-08-15 ENCOUNTER — LAB VISIT (OUTPATIENT)
Dept: LAB | Facility: HOSPITAL | Age: 58
End: 2018-08-15
Attending: INTERNAL MEDICINE
Payer: MEDICARE

## 2018-08-15 DIAGNOSIS — E78.2 MIXED HYPERLIPIDEMIA: ICD-10-CM

## 2018-08-15 LAB
ALBUMIN SERPL BCP-MCNC: 4.3 G/DL
ALP SERPL-CCNC: 116 U/L
ALT SERPL W/O P-5'-P-CCNC: 30 U/L
AST SERPL-CCNC: 18 U/L
BILIRUB DIRECT SERPL-MCNC: 0.3 MG/DL
BILIRUB SERPL-MCNC: 0.8 MG/DL
CHOLEST SERPL-MCNC: 160 MG/DL
CHOLEST/HDLC SERPL: 2.5 {RATIO}
HDLC SERPL-MCNC: 63 MG/DL
HDLC SERPL: 39.4 %
LDLC SERPL CALC-MCNC: 76.2 MG/DL
NONHDLC SERPL-MCNC: 97 MG/DL
PROT SERPL-MCNC: 7.2 G/DL
TRIGL SERPL-MCNC: 104 MG/DL

## 2018-08-15 PROCEDURE — 80076 HEPATIC FUNCTION PANEL: CPT

## 2018-08-15 PROCEDURE — 80061 LIPID PANEL: CPT

## 2018-08-15 PROCEDURE — 36415 COLL VENOUS BLD VENIPUNCTURE: CPT

## 2018-08-23 ENCOUNTER — OFFICE VISIT (OUTPATIENT)
Dept: CARDIOLOGY | Facility: CLINIC | Age: 58
End: 2018-08-23
Payer: MEDICARE

## 2018-08-23 VITALS
BODY MASS INDEX: 22.56 KG/M2 | DIASTOLIC BLOOD PRESSURE: 84 MMHG | SYSTOLIC BLOOD PRESSURE: 146 MMHG | OXYGEN SATURATION: 95 % | RESPIRATION RATE: 15 BRPM | HEART RATE: 74 BPM | HEIGHT: 67 IN | WEIGHT: 143.75 LBS

## 2018-08-23 DIAGNOSIS — Z86.73 HX-TIA (TRANSIENT ISCHEMIC ATTACK): Primary | ICD-10-CM

## 2018-08-23 DIAGNOSIS — I10 ESSENTIAL HYPERTENSION: ICD-10-CM

## 2018-08-23 DIAGNOSIS — E78.2 MIXED HYPERLIPIDEMIA: ICD-10-CM

## 2018-08-23 DIAGNOSIS — Z72.0 TOBACCO ABUSE: ICD-10-CM

## 2018-08-23 PROCEDURE — 3008F BODY MASS INDEX DOCD: CPT | Mod: CPTII,S$GLB,, | Performed by: INTERNAL MEDICINE

## 2018-08-23 PROCEDURE — 99214 OFFICE O/P EST MOD 30 MIN: CPT | Mod: S$GLB,,, | Performed by: INTERNAL MEDICINE

## 2018-08-23 PROCEDURE — 3077F SYST BP >= 140 MM HG: CPT | Mod: CPTII,S$GLB,, | Performed by: INTERNAL MEDICINE

## 2018-08-23 PROCEDURE — 99999 PR PBB SHADOW E&M-EST. PATIENT-LVL III: CPT | Mod: PBBFAC,,, | Performed by: INTERNAL MEDICINE

## 2018-08-23 PROCEDURE — 3079F DIAST BP 80-89 MM HG: CPT | Mod: CPTII,S$GLB,, | Performed by: INTERNAL MEDICINE

## 2018-08-23 RX ORDER — ASPIRIN 81 MG/1
81 TABLET ORAL DAILY
Qty: 90 TABLET | Refills: 3 | COMMUNITY
Start: 2018-08-23

## 2018-08-23 RX ORDER — LISINOPRIL 5 MG/1
5 TABLET ORAL DAILY
Qty: 90 TABLET | Refills: 3 | Status: SHIPPED | OUTPATIENT
Start: 2018-08-23 | End: 2018-12-20 | Stop reason: SDUPTHER

## 2018-08-23 NOTE — PROGRESS NOTES
CARDIOVASCULAR PROGRESS NOTE    REASON FOR CONSULT:   Courtney Reilly is a 58 y.o. female who presents for f/u of HTN, hx mult TIA.    PCP: Pau  HISTORY OF PRESENT ILLNESS:   The patient returns for follow-up.  She denies intercurrent angina or dyspnea.  She's had no palpitations, lightheadedness, dizziness, or syncope.  She denies PND, orthopnea, or lower extremity edema.  There's been no melena, hematuria, or claudicant symptoms.  She denies any symptoms of recurrent TIA.   She does continue to smoke cigarettes, and I have again strongly encouraged to quit smoking and plan to 3 refer her to the Ambulatory smoking cessation program.    CARDIOVASCULAR HISTORY:   TIA    PAST MEDICAL HISTORY:     Past Medical History:   Diagnosis Date    Arthritis     Chronic back pain     Encounter for blood transfusion     Fatigue     Fibromyalgia     GERD (gastroesophageal reflux disease)     Herniated disc     Multiple sclerosis     Ovarian tumor     Radiculopathy     Restless legs syndrome (RLS)     Spasticity     Stroke     twice    TIA (transient ischemic attack)        PAST SURGICAL HISTORY:     Past Surgical History:   Procedure Laterality Date    CEREBRAL ANEURYSM REPAIR      HYSTERECTOMY      age 27    KNEE SURGERY  2008    left    pain pump implant      [place] REMOVAL OF OVARY/TUBE(S)      SPINE SURGERY  2006      2 on neck.  2 on lumbar spine    TONSILLECTOMY      WRIST SURGERY  2007    right       ALLERGIES AND MEDICATION:     Review of patient's allergies indicates:   Allergen Reactions    Bee pollens     Mushroom flavor      Previous Medications    ATORVASTATIN (LIPITOR) 40 MG TABLET    Take 1 tablet (40 mg total) by mouth every evening.    CELECOXIB (CELEBREX) 200 MG CAPSULE    Take 200 mg by mouth 2 (two) times daily.      CHOLECALCIFEROL, VITAMIN D3, (VITAMIN D3) 1,000 UNIT CAPSULE    Take 1,000 Units by mouth once daily.    HYDROCODONE-ACETAMINOPHEN 7.5-325MG (NORCO) 7.5-325 MG PER TABLET    Take  1 tablet by mouth every 6 (six) hours as needed for Pain.    OMEPRAZOLE (PRILOSEC) 20 MG CAPSULE        QUETIAPINE FUMARATE (SEROQUEL ORAL)    Take by mouth.    SOLIFENACIN (VESICARE) 5 MG TABLET    Take 10 mg by mouth once daily.    VITAMIN E 400 UNIT CAPSULE    Take 400 Units by mouth once daily.    ZOLPIDEM (AMBIEN) 10 MG TAB    Take 5 mg by mouth nightly as needed.       SOCIAL HISTORY:     Social History     Socioeconomic History    Marital status:      Spouse name: Not on file    Number of children: Not on file    Years of education: Not on file    Highest education level: Not on file   Social Needs    Financial resource strain: Not on file    Food insecurity - worry: Not on file    Food insecurity - inability: Not on file    Transportation needs - medical: Not on file    Transportation needs - non-medical: Not on file   Occupational History    Not on file   Tobacco Use    Smoking status: Current Every Day Smoker     Packs/day: 0.50     Years: 30.00     Pack years: 15.00     Types: Cigarettes    Smokeless tobacco: Never Used    Tobacco comment: .    Occup:   disabled.   , lives alone.     Substance and Sexual Activity    Alcohol use: Yes     Comment: socially    Drug use: No    Sexual activity: No     Partners: Male   Other Topics Concern    Not on file   Social History Narrative    Not on file       FAMILY HISTORY:     Family History   Problem Relation Age of Onset    Cancer Mother         Lung cancer    Cancer Father         Lung cancer       REVIEW OF SYSTEMS:   Review of Systems   Constitutional: Negative for chills, diaphoresis and fever.   HENT: Negative for nosebleeds.    Eyes: Negative for blurred vision, double vision and photophobia.   Respiratory: Negative for hemoptysis, shortness of breath and wheezing.    Cardiovascular: Negative for chest pain, palpitations, orthopnea, claudication, leg swelling and PND.   Gastrointestinal: Negative for abdominal pain,  "blood in stool, heartburn, melena, nausea and vomiting.   Genitourinary: Negative for flank pain and hematuria.   Musculoskeletal: Negative for falls, myalgias and neck pain.   Skin: Negative for rash.   Neurological: Negative for dizziness, seizures, loss of consciousness, weakness and headaches.   Endo/Heme/Allergies: Negative for polydipsia. Does not bruise/bleed easily.   Psychiatric/Behavioral: Negative for depression and memory loss. The patient is not nervous/anxious.        PHYSICAL EXAM:     Vitals:    08/23/18 1022   BP: (!) 146/84   Pulse: 74   Resp: 15    Body mass index is 22.51 kg/m².  Weight: 65.2 kg (143 lb 11.8 oz)   Height: 5' 7" (170.2 cm)     Physical Exam   Constitutional: She is oriented to person, place, and time. She appears well-developed and well-nourished. She is cooperative.  Non-toxic appearance. No distress.   HENT:   Head: Normocephalic and atraumatic.   Eyes: Conjunctivae and EOM are normal. Pupils are equal, round, and reactive to light. No scleral icterus.   Neck: Trachea normal. Neck supple. Normal carotid pulses and no JVD present. Carotid bruit is not present. No neck rigidity. No edema present. No thyromegaly present.   Cardiovascular: Normal rate, regular rhythm, S1 normal and S2 normal. PMI is not displaced. Exam reveals no gallop and no friction rub.   No murmur heard.  Pulses:       Carotid pulses are 2+ on the right side, and 2+ on the left side.  Pulmonary/Chest: Effort normal and breath sounds normal. No respiratory distress. She has no wheezes. She has no rales. She exhibits no tenderness.   Abdominal: Soft. Bowel sounds are normal. She exhibits no distension. There is no hepatosplenomegaly.   Musculoskeletal: She exhibits no edema or tenderness.   Feet:   Right Foot:   Skin Integrity: Negative for ulcer.   Left Foot:   Skin Integrity: Negative for ulcer.   Neurological: She is alert and oriented to person, place, and time. No cranial nerve deficit.   Skin: Skin is warm " and dry. No rash noted. No erythema.   Psychiatric: She has a normal mood and affect. Her speech is normal and behavior is normal.   Vitals reviewed.      DATA:   EKG: (personally reviewed tracing)  4/2/18     Laboratory:  CBC:  Recent Labs   Lab  04/02/18   1028   WHITE BLOOD CELL COUNT  10.03   HEMOGLOBIN  14.6   HEMATOCRIT  42.7   PLATELETS  441 H       CHEMISTRIES:  Recent Labs   Lab  04/02/18   1133  04/20/18   0822   GLUCOSE  92  88   SODIUM  142  139   POTASSIUM  4.6  3.9   BUN BLD  14  22 H   CREATININE  1.0  0.9   EGFR IF   >60  >60   EGFR IF NON-  >60  >60   CALCIUM  9.9  9.8   MAGNESIUM  2.3   --        CARDIAC BIOMARKERS:  Recent Labs   Lab  04/02/18   1133  04/02/18   1440   TROPONIN I  <0.006  <0.006       COAGS:  Recent Labs   Lab  02/27/18   0525  04/02/18   1028   INR  0.9  1.0       LIPIDS/LFTS:  Recent Labs   Lab  04/02/18   1133  04/20/18   0822  08/15/18   1120   CHOLESTEROL   --   231 H  160   TRIGLYCERIDES   --   161 H  104   HDL   --   57  63   LDL CHOLESTEROL   --   141.8  76.2   NON-HDL CHOLESTEROL   --   174  97   AST  13   --   18   ALT  10   --   30       Cardiovascular Testing:  Carotid US 4/20/18  There is 20 - 39% right Internal Carotid stenosis.  There is 20 - 39% left Internal Carotid stenosis.    Echo with bubble study 4/20/18    1 - Normal left ventricular systolic function (EF 55-60%).     2 - No wall motion abnormalities.     3 - Concentric remodeling.     4 - Trivial tricuspid regurgitation.     5 - The estimated PA systolic pressure is 33 mmHg.     6 - No evidence of intracardiac shunt (negative bubble study).     7 - No intracardiac source of embolus noted on this exam.  If high clinical suspicion, consider MARIA D +/- bubble study.     Renal art US 4/20/18  There is insignificant stenosis (0-59%) in the Right renal artery.   Right kidney 11.4 cm.  There is insignificant stenosis (0-59%) in the Left renal artery.   Left kidney 11.9 cm.      ASSESSMENT:   # HTN, uncontrolled, FRANDY US neg 4/2018.  Pt prev intol of HCTZ.    # Tob abuse  # chr back pain  # HLP on atorva 40mg  # Hx TIA, recurrent (last at age 44), carotid and echo/bubble 4/2018 neg    PLAN:   Cont med rx  Start lisinopril 5mg qd  Check BMP 2 weeks  Cont efforts at quitting smoking, re-refer to ambulatory smoking cessation program  Consider EVM/ILR if recurrent TIA  RTC 1 month      Robert Aguirre MD, FACC

## 2018-09-20 ENCOUNTER — LAB VISIT (OUTPATIENT)
Dept: LAB | Facility: HOSPITAL | Age: 58
End: 2018-09-20
Attending: INTERNAL MEDICINE
Payer: MEDICARE

## 2018-09-20 DIAGNOSIS — I10 ESSENTIAL HYPERTENSION: ICD-10-CM

## 2018-09-20 LAB
ANION GAP SERPL CALC-SCNC: 11 MMOL/L
BUN SERPL-MCNC: 11 MG/DL
CALCIUM SERPL-MCNC: 10 MG/DL
CHLORIDE SERPL-SCNC: 105 MMOL/L
CO2 SERPL-SCNC: 26 MMOL/L
CREAT SERPL-MCNC: 1 MG/DL
EST. GFR  (AFRICAN AMERICAN): >60 ML/MIN/1.73 M^2
EST. GFR  (NON AFRICAN AMERICAN): >60 ML/MIN/1.73 M^2
GLUCOSE SERPL-MCNC: 112 MG/DL
POTASSIUM SERPL-SCNC: 4.2 MMOL/L
SODIUM SERPL-SCNC: 142 MMOL/L

## 2018-09-20 PROCEDURE — 36415 COLL VENOUS BLD VENIPUNCTURE: CPT

## 2018-09-20 PROCEDURE — 80048 BASIC METABOLIC PNL TOTAL CA: CPT

## 2018-09-26 ENCOUNTER — OFFICE VISIT (OUTPATIENT)
Dept: CARDIOLOGY | Facility: CLINIC | Age: 58
End: 2018-09-26
Payer: MEDICARE

## 2018-09-26 VITALS
SYSTOLIC BLOOD PRESSURE: 124 MMHG | DIASTOLIC BLOOD PRESSURE: 72 MMHG | BODY MASS INDEX: 22.76 KG/M2 | OXYGEN SATURATION: 98 % | HEIGHT: 67 IN | RESPIRATION RATE: 15 BRPM | WEIGHT: 145 LBS | HEART RATE: 82 BPM

## 2018-09-26 DIAGNOSIS — E78.2 MIXED HYPERLIPIDEMIA: ICD-10-CM

## 2018-09-26 DIAGNOSIS — I10 ESSENTIAL HYPERTENSION: Primary | ICD-10-CM

## 2018-09-26 DIAGNOSIS — Z86.73 HX-TIA (TRANSIENT ISCHEMIC ATTACK): ICD-10-CM

## 2018-09-26 DIAGNOSIS — Z72.0 TOBACCO ABUSE: ICD-10-CM

## 2018-09-26 PROCEDURE — 99214 OFFICE O/P EST MOD 30 MIN: CPT | Mod: S$PBB,,, | Performed by: INTERNAL MEDICINE

## 2018-09-26 PROCEDURE — 3078F DIAST BP <80 MM HG: CPT | Mod: CPTII,,, | Performed by: INTERNAL MEDICINE

## 2018-09-26 PROCEDURE — 93010 ELECTROCARDIOGRAM REPORT: CPT | Mod: S$PBB,,, | Performed by: INTERNAL MEDICINE

## 2018-09-26 PROCEDURE — 93005 ELECTROCARDIOGRAM TRACING: CPT | Mod: PBBFAC | Performed by: INTERNAL MEDICINE

## 2018-09-26 PROCEDURE — 3074F SYST BP LT 130 MM HG: CPT | Mod: CPTII,,, | Performed by: INTERNAL MEDICINE

## 2018-09-26 PROCEDURE — 99999 PR PBB SHADOW E&M-EST. PATIENT-LVL III: CPT | Mod: PBBFAC,,, | Performed by: INTERNAL MEDICINE

## 2018-09-26 PROCEDURE — 99213 OFFICE O/P EST LOW 20 MIN: CPT | Mod: PBBFAC | Performed by: INTERNAL MEDICINE

## 2018-09-26 PROCEDURE — 3008F BODY MASS INDEX DOCD: CPT | Mod: CPTII,,, | Performed by: INTERNAL MEDICINE

## 2018-09-26 NOTE — PROGRESS NOTES
CARDIOVASCULAR PROGRESS NOTE    REASON FOR CONSULT:   Courtney Reilly is a 58 y.o. female who presents for f/u of HTN, hx mult TIA.    PCP: Pau  HISTORY OF PRESENT ILLNESS:   The patient returns for follow-up.  She denies intercurrent angina or dyspnea.  She's had no palpitations, lightheadedness, dizziness, or syncope.  She denies PND, orthopnea, or lower extremity edema.  There's been no melena, hematuria, or claudicant symptoms.  She denies any symptoms of recurrent TIA.   Her main complaint today peers to be related to abdominal bloating, although she has had no weight gain or edema.  She does continue to smoke cigarettes, and I have again strongly encouraged to quit smoking.  She has been referred to the Ambulatory smoking cessation program several times.    CARDIOVASCULAR HISTORY:   TIA    PAST MEDICAL HISTORY:     Past Medical History:   Diagnosis Date    Arthritis     Chronic back pain     Encounter for blood transfusion     Fatigue     Fibromyalgia     GERD (gastroesophageal reflux disease)     Herniated disc     Multiple sclerosis     Ovarian tumor     Radiculopathy     Restless legs syndrome (RLS)     Spasticity     Stroke     twice    TIA (transient ischemic attack)        PAST SURGICAL HISTORY:     Past Surgical History:   Procedure Laterality Date    CEREBRAL ANEURYSM REPAIR      HYSTERECTOMY      age 27    KNEE SURGERY  2008    left    pain pump implant      IA REMOVAL OF OVARY/TUBE(S)      REMOVAL-SPINAL NEUROSTIMULATOR N/A 2/27/2018    Performed by Bernadette Thompson MD at [facility] OR [address] [place] [address]    SPINE SURGERY  2006      2 on neck.  2 on lumbar spine    TONSILLECTOMY      WRIST SURGERY  2007    right       ALLERGIES AND MEDICATION:     Review of patient's allergies indicates:   Allergen Reactions    Bee pollens     Mushroom flavor         Medication List           Accurate as of 9/26/18 10:53 AM. If you have any questions, ask your nurse or doctor.               CONTINUE taking these  medications    aspirin 81 MG EC tablet  Commonly known as:  ECOTRIN  Take 1 tablet (81 mg total) by mouth once daily.     atorvastatin 40 MG tablet  Commonly known as:  LIPITOR  Take 1 tablet (40 mg total) by mouth every evening.     celecoxib 200 MG capsule  Commonly known as:  CeleBREX     HYDROcodone-acetaminophen 7.5-325 mg per tablet  Commonly known as:  NORCO     lisinopril 5 MG tablet  Commonly known as:  PRINIVIL,ZESTRIL  Take 1 tablet (5 mg total) by mouth once daily.     omeprazole 20 MG capsule  Commonly known as:  PRILOSEC     SEROQUEL ORAL     solifenacin 5 MG tablet  Commonly known as:  VESICARE     VITAMIN D3 1,000 unit capsule  Generic drug:  cholecalciferol (vitamin D3)     vitamin E 400 UNIT capsule     zolpidem 10 mg Tab  Commonly known as:  AMBIEN              SOCIAL HISTORY:     Social History     Socioeconomic History    Marital status:      Spouse name: Not on file    Number of children: Not on file    Years of education: Not on file    Highest education level: Not on file   Social Needs    Financial resource strain: Not on file    Food insecurity - worry: Not on file    Food insecurity - inability: Not on file    Transportation needs - medical: Not on file    Transportation needs - non-medical: Not on file   Occupational History    Not on file   Tobacco Use    Smoking status: Current Every Day Smoker     Packs/day: 0.50     Years: 30.00     Pack years: 15.00     Types: Cigarettes    Smokeless tobacco: Never Used    Tobacco comment: .    Occup:   disabled.   , lives alone.     Substance and Sexual Activity    Alcohol use: Yes     Comment: socially    Drug use: No    Sexual activity: No     Partners: Male   Other Topics Concern    Not on file   Social History Narrative    Not on file       FAMILY HISTORY:     Family History   Problem Relation Age of Onset    Cancer Mother         Lung cancer    Cancer Father         Lung cancer       REVIEW OF SYSTEMS:  "  Review of Systems   Constitutional: Negative for chills, diaphoresis and fever.   HENT: Negative for nosebleeds.    Eyes: Negative for blurred vision, double vision and photophobia.   Respiratory: Negative for hemoptysis, shortness of breath and wheezing.    Cardiovascular: Negative for chest pain, palpitations, orthopnea, claudication, leg swelling and PND.   Gastrointestinal: Negative for abdominal pain, blood in stool, heartburn, melena, nausea and vomiting.   Genitourinary: Negative for flank pain and hematuria.   Musculoskeletal: Negative for falls, myalgias and neck pain.   Skin: Negative for rash.   Neurological: Negative for dizziness, seizures, loss of consciousness, weakness and headaches.   Endo/Heme/Allergies: Negative for polydipsia. Does not bruise/bleed easily.   Psychiatric/Behavioral: Negative for depression and memory loss. The patient is not nervous/anxious.        PHYSICAL EXAM:     Vitals:    09/26/18 0950   BP: 124/72   Pulse: 82   Resp: 15    Body mass index is 22.71 kg/m².  Weight: 65.8 kg (145 lb)   Height: 5' 7" (170.2 cm)     Physical Exam   Constitutional: She is oriented to person, place, and time. She appears well-developed and well-nourished. She is cooperative.  Non-toxic appearance. No distress.   HENT:   Head: Normocephalic and atraumatic.   Eyes: Conjunctivae and EOM are normal. Pupils are equal, round, and reactive to light. No scleral icterus.   Neck: Trachea normal and normal range of motion. Neck supple. Normal carotid pulses and no JVD present. Carotid bruit is not present. No neck rigidity. No tracheal deviation and no edema present. No thyromegaly present.   Cardiovascular: Normal rate, regular rhythm, S1 normal and S2 normal. PMI is not displaced. Exam reveals no gallop and no friction rub.   No murmur heard.  Pulses:       Carotid pulses are 2+ on the right side, and 2+ on the left side.  Pulmonary/Chest: Effort normal and breath sounds normal. No stridor. No respiratory " distress. She has no wheezes. She has no rales. She exhibits no tenderness.   Abdominal: Soft. She exhibits no distension. There is no hepatosplenomegaly.   Musculoskeletal: She exhibits no edema or tenderness.   Feet:   Right Foot:   Skin Integrity: Negative for ulcer.   Left Foot:   Skin Integrity: Negative for ulcer.   Neurological: She is alert and oriented to person, place, and time. No cranial nerve deficit.   Skin: Skin is warm and dry. No rash noted. No erythema.   Psychiatric: She has a normal mood and affect. Her speech is normal and behavior is normal.   Vitals reviewed.      DATA:   EKG: (personally reviewed tracing)  9/26/18 SR 85    Laboratory:  CBC:  Recent Labs   Lab  04/02/18   1028   WHITE BLOOD CELL COUNT  10.03   HEMOGLOBIN  14.6   HEMATOCRIT  42.7   PLATELETS  441 H       CHEMISTRIES:  Recent Labs   Lab  04/02/18   1133  04/20/18   0822  09/20/18   1348   GLUCOSE  92  88  112 H   SODIUM  142  139  142   POTASSIUM  4.6  3.9  4.2   BUN BLD  14  22 H  11   CREATININE  1.0  0.9  1.0   EGFR IF AFRICAN AMERICAN  >60  >60  >60   EGFR IF NON-  >60  >60  >60   CALCIUM  9.9  9.8  10.0   MAGNESIUM  2.3   --    --        CARDIAC BIOMARKERS:  Recent Labs   Lab  04/02/18   1133  04/02/18   1440   TROPONIN I  <0.006  <0.006       COAGS:  Recent Labs   Lab  02/27/18   0525  04/02/18   1028   INR  0.9  1.0       LIPIDS/LFTS:  Recent Labs   Lab  04/02/18   1133  04/20/18   0822  08/15/18   1120   CHOLESTEROL   --   231 H  160   TRIGLYCERIDES   --   161 H  104   HDL   --   57  63   LDL CHOLESTEROL   --   141.8  76.2   NON-HDL CHOLESTEROL   --   174  97   AST  13   --   18   ALT  10   --   30       Cardiovascular Testing:  Carotid US 4/20/18  There is 20 - 39% right Internal Carotid stenosis.  There is 20 - 39% left Internal Carotid stenosis.    Echo with bubble study 4/20/18    1 - Normal left ventricular systolic function (EF 55-60%).     2 - No wall motion abnormalities.     3 - Concentric  remodeling.     4 - Trivial tricuspid regurgitation.     5 - The estimated PA systolic pressure is 33 mmHg.     6 - No evidence of intracardiac shunt (negative bubble study).     7 - No intracardiac source of embolus noted on this exam.  If high clinical suspicion, consider MARIA D +/- bubble study.     Renal art US 4/20/18  There is insignificant stenosis (0-59%) in the Right renal artery.   Right kidney 11.4 cm.  There is insignificant stenosis (0-59%) in the Left renal artery.   Left kidney 11.9 cm.     ASSESSMENT:   # HTN, controlled, FRANDY US neg 4/2018.  Pt prev intol of HCTZ.    # Tob abuse, persistent.  The patient has been referred to the Ambulatory smoking cessation program several times.  # chr back pain  # HLP on atorva 40mg  # Hx TIA, recurrent (last at age 44), carotid and echo/bubble 4/2018 neg    PLAN:   Cont med rx  Cont efforts at quitting smoking, re-refer to ambulatory smoking cessation program  Consider EVM/ILR if recurrent TIA  RTC prn      Robert Aguirre MD, FACC

## 2018-12-20 ENCOUNTER — LAB VISIT (OUTPATIENT)
Dept: LAB | Facility: HOSPITAL | Age: 58
End: 2018-12-20
Attending: INTERNAL MEDICINE
Payer: MEDICARE

## 2018-12-20 ENCOUNTER — OFFICE VISIT (OUTPATIENT)
Dept: CARDIOLOGY | Facility: CLINIC | Age: 58
End: 2018-12-20
Payer: MEDICARE

## 2018-12-20 VITALS
WEIGHT: 145 LBS | OXYGEN SATURATION: 99 % | SYSTOLIC BLOOD PRESSURE: 142 MMHG | HEIGHT: 67 IN | HEART RATE: 88 BPM | RESPIRATION RATE: 15 BRPM | BODY MASS INDEX: 22.76 KG/M2 | DIASTOLIC BLOOD PRESSURE: 78 MMHG

## 2018-12-20 DIAGNOSIS — I73.9 PAD (PERIPHERAL ARTERY DISEASE): ICD-10-CM

## 2018-12-20 DIAGNOSIS — Z72.0 TOBACCO ABUSE: ICD-10-CM

## 2018-12-20 DIAGNOSIS — Z86.73 HX-TIA (TRANSIENT ISCHEMIC ATTACK): ICD-10-CM

## 2018-12-20 DIAGNOSIS — I10 ESSENTIAL HYPERTENSION: Primary | ICD-10-CM

## 2018-12-20 DIAGNOSIS — E78.2 MIXED HYPERLIPIDEMIA: ICD-10-CM

## 2018-12-20 LAB — CK SERPL-CCNC: 75 U/L

## 2018-12-20 PROCEDURE — 3078F DIAST BP <80 MM HG: CPT | Mod: CPTII,S$GLB,, | Performed by: INTERNAL MEDICINE

## 2018-12-20 PROCEDURE — 3077F SYST BP >= 140 MM HG: CPT | Mod: CPTII,S$GLB,, | Performed by: INTERNAL MEDICINE

## 2018-12-20 PROCEDURE — 99214 OFFICE O/P EST MOD 30 MIN: CPT | Mod: S$GLB,,, | Performed by: INTERNAL MEDICINE

## 2018-12-20 PROCEDURE — 99999 PR PBB SHADOW E&M-EST. PATIENT-LVL III: CPT | Mod: PBBFAC,,, | Performed by: INTERNAL MEDICINE

## 2018-12-20 PROCEDURE — 36415 COLL VENOUS BLD VENIPUNCTURE: CPT

## 2018-12-20 PROCEDURE — 3008F BODY MASS INDEX DOCD: CPT | Mod: CPTII,S$GLB,, | Performed by: INTERNAL MEDICINE

## 2018-12-20 PROCEDURE — 82550 ASSAY OF CK (CPK): CPT

## 2018-12-20 RX ORDER — LISINOPRIL 2.5 MG/1
2.5 TABLET ORAL DAILY
Qty: 90 TABLET | Refills: 3
Start: 2018-12-20 | End: 2022-03-02

## 2018-12-20 NOTE — PROGRESS NOTES
CARDIOVASCULAR PROGRESS NOTE    REASON FOR CONSULT:   Courtney Reilly is a 58 y.o. female who presents for f/u of HTN, hx mult TIA.    PCP: Pau  HISTORY OF PRESENT ILLNESS:   The patient returns for follow-up.  In the interim since her last visit, she developed bilateral leg discomfort which she describes as a burning sensation, without an exertional component.  She relates this to her Lipitor prescription, and stop taking the Lipitor approximately 1 and half months ago.  However, the symptoms of neuropathy appear to persist.  She also has been complaining of generalized fatigue and malaise, as well as mild hair loss, and as of several days ago, stopped taking her lisinopril.  She otherwise denies nicolasa angina or dyspnea.  There has been no palpitations, lightheadedness, dizziness, or syncope.  She denies PND, orthopnea, or lower extremity edema.  There has been no melena, or hematuria.    The patient does continue to smoke cigarettes and I again confirmed with her her wish to not be enrolled in the Ambulatory smoking cessation program, nor her wish to quit smoking.    We had a prolonged conversation regarding her risk profile and the reason for her being on a high dose high potency statin medication being her risk factor profile and continued smoking, not so much her lipid levels.    CARDIOVASCULAR HISTORY:   TIA    PAST MEDICAL HISTORY:     Past Medical History:   Diagnosis Date    Arthritis     Chronic back pain     Encounter for blood transfusion     Fatigue     Fibromyalgia     GERD (gastroesophageal reflux disease)     Herniated disc     Multiple sclerosis     Ovarian tumor     Radiculopathy     Restless legs syndrome (RLS)     Spasticity     Stroke     twice    TIA (transient ischemic attack)        PAST SURGICAL HISTORY:     Past Surgical History:   Procedure Laterality Date    CEREBRAL ANEURYSM REPAIR      HYSTERECTOMY      age 27    KNEE SURGERY  2008    left    pain pump implant      GA  REMOVAL OF OVARY/TUBE(S)      REMOVAL-SPINAL NEUROSTIMULATOR N/A 2/27/2018    Performed by Bernadette Thompson MD at [facility] OR [address] [place] [address]    SPINE SURGERY  2006      2 on neck.  2 on lumbar spine    TONSILLECTOMY      WRIST SURGERY  2007    right       ALLERGIES AND MEDICATION:     Review of patient's allergies indicates:   Allergen Reactions    Bee pollens     Mushroom flavor         Medication List           Accurate as of 12/20/18 10:49 AM. If you have any questions, ask your nurse or doctor.               CONTINUE taking these medications    aspirin 81 MG EC tablet  Commonly known as:  ECOTRIN  Take 1 tablet (81 mg total) by mouth once daily.     atorvastatin 40 MG tablet  Commonly known as:  LIPITOR  Take 1 tablet (40 mg total) by mouth every evening.     celecoxib 200 MG capsule  Commonly known as:  CeleBREX     HYDROcodone-acetaminophen 7.5-325 mg per tablet  Commonly known as:  NORCO     lisinopril 5 MG tablet  Commonly known as:  PRINIVIL,ZESTRIL  Take 1 tablet (5 mg total) by mouth once daily.     omeprazole 20 MG capsule  Commonly known as:  PRILOSEC     SEROQUEL ORAL     solifenacin 5 MG tablet  Commonly known as:  VESICARE     VITAMIN D3 1,000 unit capsule  Generic drug:  cholecalciferol (vitamin D3)     vitamin E 400 UNIT capsule     zolpidem 10 mg Tab  Commonly known as:  AMBIEN              SOCIAL HISTORY:     Social History     Socioeconomic History    Marital status:      Spouse name: Not on file    Number of children: Not on file    Years of education: Not on file    Highest education level: Not on file   Social Needs    Financial resource strain: Not on file    Food insecurity - worry: Not on file    Food insecurity - inability: Not on file    Transportation needs - medical: Not on file    Transportation needs - non-medical: Not on file   Occupational History    Not on file   Tobacco Use    Smoking status: Current Every Day Smoker     Packs/day: 0.50     Years: 30.00     Pack years:  "15.00     Types: Cigarettes    Smokeless tobacco: Never Used    Tobacco comment: .    Occup:   disabled.   , lives alone.     Substance and Sexual Activity    Alcohol use: Yes     Comment: socially    Drug use: No    Sexual activity: No     Partners: Male   Other Topics Concern    Not on file   Social History Narrative    Not on file       FAMILY HISTORY:     Family History   Problem Relation Age of Onset    Cancer Mother         Lung cancer    Cancer Father         Lung cancer       REVIEW OF SYSTEMS:   Review of Systems   Constitutional: Negative for chills, diaphoresis and fever.   HENT: Negative for nosebleeds.    Eyes: Negative for blurred vision, double vision and photophobia.   Respiratory: Negative for hemoptysis, shortness of breath and wheezing.    Cardiovascular: Negative for chest pain, palpitations, orthopnea, claudication, leg swelling and PND.   Gastrointestinal: Negative for abdominal pain, blood in stool, heartburn, melena, nausea and vomiting.   Genitourinary: Negative for flank pain and hematuria.   Musculoskeletal: Negative for falls, myalgias and neck pain.   Skin: Negative for rash.   Neurological: Negative for dizziness, seizures, loss of consciousness, weakness and headaches.   Endo/Heme/Allergies: Negative for polydipsia. Does not bruise/bleed easily.   Psychiatric/Behavioral: Negative for depression and memory loss. The patient is not nervous/anxious.        PHYSICAL EXAM:     Vitals:    18 1045   BP: (!) 142/78   Pulse: 88   Resp: 15    Body mass index is 22.71 kg/m².  Weight: 65.8 kg (145 lb)   Height: 5' 7" (170.2 cm)     Physical Exam   Constitutional: She is oriented to person, place, and time. She appears well-developed and well-nourished. She is cooperative.  Non-toxic appearance. No distress.   HENT:   Head: Normocephalic and atraumatic.   Eyes: Conjunctivae and EOM are normal. Pupils are equal, round, and reactive to light. No scleral icterus.   Neck: " Trachea normal and normal range of motion. Neck supple. Normal carotid pulses and no JVD present. Carotid bruit is not present. No neck rigidity. No tracheal deviation and no edema present. No thyromegaly present.   Cardiovascular: Normal rate, regular rhythm, S1 normal and S2 normal. PMI is not displaced. Exam reveals no gallop and no friction rub.   No murmur heard.  Pulses:       Carotid pulses are 2+ on the right side, and 2+ on the left side.  Pulmonary/Chest: Effort normal and breath sounds normal. No stridor. No respiratory distress. She has no wheezes. She has no rales. She exhibits no tenderness.   Abdominal: Soft. She exhibits no distension. There is no hepatosplenomegaly.   Musculoskeletal: She exhibits no edema or tenderness.   Feet:   Right Foot:   Skin Integrity: Negative for ulcer.   Left Foot:   Skin Integrity: Negative for ulcer.   Neurological: She is alert and oriented to person, place, and time. No cranial nerve deficit.   Skin: Skin is warm and dry. No rash noted. No erythema.   Psychiatric: She has a normal mood and affect. Her speech is normal and behavior is normal.   Vitals reviewed.      DATA:   EKG: (personally reviewed tracing)  9/26/18 SR 85    Laboratory:  CBC:  Recent Labs   Lab 04/02/18  1028   WHITE BLOOD CELL COUNT 10.03   HEMOGLOBIN 14.6   HEMATOCRIT 42.7   PLATELETS 441 H       CHEMISTRIES:  Recent Labs   Lab 04/02/18  1133 04/20/18  0822 09/20/18  1348   GLUCOSE 92 88 112 H   SODIUM 142 139 142   POTASSIUM 4.6 3.9 4.2   BUN BLD 14 22 H 11   CREATININE 1.0 0.9 1.0   EGFR IF AFRICAN AMERICAN >60 >60 >60   EGFR IF NON- >60 >60 >60   CALCIUM 9.9 9.8 10.0   MAGNESIUM 2.3  --   --        CARDIAC BIOMARKERS:  Recent Labs   Lab 04/02/18  1133 04/02/18  1440   TROPONIN I <0.006 <0.006       COAGS:  Recent Labs   Lab 02/27/18  0525 04/02/18  1028   INR 0.9 1.0       LIPIDS/LFTS:  Recent Labs   Lab 04/02/18  1133 04/20/18  0822 08/15/18  1120   CHOLESTEROL  --  231 H 160    TRIGLYCERIDES  --  161 H 104   HDL  --  57 63   LDL CHOLESTEROL  --  141.8 76.2   NON-HDL CHOLESTEROL  --  174 97   AST 13  --  18   ALT 10  --  30       Cardiovascular Testing:  Carotid US 4/20/18  There is 20 - 39% right Internal Carotid stenosis.  There is 20 - 39% left Internal Carotid stenosis.    Echo with bubble study 4/20/18    1 - Normal left ventricular systolic function (EF 55-60%).     2 - No wall motion abnormalities.     3 - Concentric remodeling.     4 - Trivial tricuspid regurgitation.     5 - The estimated PA systolic pressure is 33 mmHg.     6 - No evidence of intracardiac shunt (negative bubble study).     7 - No intracardiac source of embolus noted on this exam.  If high clinical suspicion, consider MARIA D +/- bubble study.     Renal art US 4/20/18  There is insignificant stenosis (0-59%) in the Right renal artery.   Right kidney 11.4 cm.  There is insignificant stenosis (0-59%) in the Left renal artery.   Left kidney 11.9 cm.     ASSESSMENT:   # HTN, uncontrolled, FRANDY US neg 4/2018.  Pt prev intol of HCTZ.    # Tob abuse, persistent.  The patient has been referred to the Ambulatory smoking cessation program several times.  # chr back pain  # HLP, ?neuropathy on atrova 40mg (persistent sxs despite now being off med for 6 weeks)  # Hx TIA, recurrent (last at age 44), carotid and echo/bubble 4/2018 neg    PLAN:   Cont med rx  Resume lisinopril 2.5mg qd (dec from 5mg qd)  Check LE art US/OUSMANE  Check CPK  RTC 1 month  Attempt to resume statin rx  Consider EVM/ILR if recurrent TIA      Robert Aguirre MD, FACC

## 2021-09-10 DIAGNOSIS — M54.12 BRACHIAL NEURITIS: Primary | ICD-10-CM

## 2021-09-20 ENCOUNTER — HOSPITAL ENCOUNTER (OUTPATIENT)
Dept: RADIOLOGY | Facility: HOSPITAL | Age: 61
Discharge: HOME | End: 2021-09-20
Attending: NEUROLOGICAL SURGERY
Payer: MEDICARE

## 2021-09-20 ENCOUNTER — HOSPITAL ENCOUNTER (OUTPATIENT)
Dept: RADIOLOGY | Facility: HOSPITAL | Age: 61
Discharge: HOME | End: 2021-09-20
Attending: RADIOLOGY
Payer: MEDICARE

## 2021-09-20 DIAGNOSIS — M54.12 BRACHIAL NEURITIS: ICD-10-CM

## 2021-09-20 DIAGNOSIS — M54.9 BACK PAIN: ICD-10-CM

## 2021-09-20 PROCEDURE — 72125 CT NECK SPINE W/O DYE: CPT | Mod: TC,PO

## 2021-09-20 PROCEDURE — 72100 X-RAY EXAM L-S SPINE 2/3 VWS: CPT | Mod: TC,PO

## 2021-09-20 PROCEDURE — 72050 X-RAY EXAM NECK SPINE 4/5VWS: CPT | Mod: TC,PO

## 2021-12-08 ENCOUNTER — HOSPITAL ENCOUNTER (EMERGENCY)
Facility: HOSPITAL | Age: 61
Discharge: HOME | End: 2021-12-08
Attending: EMERGENCY MEDICINE
Payer: MEDICARE

## 2021-12-08 VITALS
SYSTOLIC BLOOD PRESSURE: 135 MMHG | HEIGHT: 66 IN | BODY MASS INDEX: 21.69 KG/M2 | WEIGHT: 135 LBS | RESPIRATION RATE: 20 BRPM | DIASTOLIC BLOOD PRESSURE: 84 MMHG | TEMPERATURE: 99 F | HEART RATE: 84 BPM | OXYGEN SATURATION: 99 %

## 2021-12-08 DIAGNOSIS — G89.29 CHRONIC NECK PAIN: Primary | ICD-10-CM

## 2021-12-08 DIAGNOSIS — M79.10 MYALGIA: ICD-10-CM

## 2021-12-08 DIAGNOSIS — R51.9 HEADACHE: ICD-10-CM

## 2021-12-08 DIAGNOSIS — M54.2 CHRONIC NECK PAIN: Primary | ICD-10-CM

## 2021-12-08 DIAGNOSIS — R90.0 INTRACRANIAL MASS: ICD-10-CM

## 2021-12-08 LAB
ALBUMIN SERPL BCP-MCNC: 4.6 G/DL (ref 3.5–5.2)
ALP SERPL-CCNC: 77 U/L (ref 55–135)
ALT SERPL W/O P-5'-P-CCNC: 13 U/L (ref 10–44)
ANION GAP SERPL CALC-SCNC: 11 MMOL/L (ref 8–16)
AST SERPL-CCNC: 20 U/L (ref 10–40)
BASOPHILS # BLD AUTO: 0.04 K/UL (ref 0–0.2)
BASOPHILS NFR BLD: 0.6 % (ref 0–1.9)
BILIRUB SERPL-MCNC: 0.6 MG/DL (ref 0.1–1)
BUN SERPL-MCNC: 8 MG/DL (ref 6–20)
CALCIUM SERPL-MCNC: 9.9 MG/DL (ref 8.7–10.5)
CHLORIDE SERPL-SCNC: 103 MMOL/L (ref 95–110)
CO2 SERPL-SCNC: 24 MMOL/L (ref 23–29)
CREAT SERPL-MCNC: 0.9 MG/DL (ref 0.5–1.4)
CREAT SERPL-MCNC: 0.9 MG/DL (ref 0.5–1.4)
DIFFERENTIAL METHOD: ABNORMAL
EOSINOPHIL # BLD AUTO: 0.1 K/UL (ref 0–0.5)
EOSINOPHIL NFR BLD: 1.9 % (ref 0–8)
ERYTHROCYTE [DISTWIDTH] IN BLOOD BY AUTOMATED COUNT: 12.4 % (ref 11.5–14.5)
EST. GFR  (AFRICAN AMERICAN): >60 ML/MIN/1.73 M^2
EST. GFR  (NON AFRICAN AMERICAN): >60 ML/MIN/1.73 M^2
GLUCOSE SERPL-MCNC: 108 MG/DL (ref 70–110)
HCT VFR BLD AUTO: 42.3 % (ref 37–48.5)
HGB BLD-MCNC: 14.2 G/DL (ref 12–16)
IMM GRANULOCYTES # BLD AUTO: 0.02 K/UL (ref 0–0.04)
IMM GRANULOCYTES NFR BLD AUTO: 0.3 % (ref 0–0.5)
INFLUENZA A, MOLECULAR: NEGATIVE
INFLUENZA B, MOLECULAR: NEGATIVE
LIPASE SERPL-CCNC: 36 U/L (ref 4–60)
LYMPHOCYTES # BLD AUTO: 2 K/UL (ref 1–4.8)
LYMPHOCYTES NFR BLD: 29.9 % (ref 18–48)
MCH RBC QN AUTO: 31.8 PG (ref 27–31)
MCHC RBC AUTO-ENTMCNC: 33.6 G/DL (ref 32–36)
MCV RBC AUTO: 95 FL (ref 82–98)
MONOCYTES # BLD AUTO: 0.5 K/UL (ref 0.3–1)
MONOCYTES NFR BLD: 7 % (ref 4–15)
NEUTROPHILS # BLD AUTO: 4.1 K/UL (ref 1.8–7.7)
NEUTROPHILS NFR BLD: 60.3 % (ref 38–73)
NRBC BLD-RTO: 0 /100 WBC
PLATELET # BLD AUTO: 386 K/UL (ref 150–450)
PMV BLD AUTO: 8.5 FL (ref 9.2–12.9)
POTASSIUM SERPL-SCNC: 3.8 MMOL/L (ref 3.5–5.1)
PROT SERPL-MCNC: 7.7 G/DL (ref 6–8.4)
RBC # BLD AUTO: 4.47 M/UL (ref 4–5.4)
SAMPLE: NORMAL
SARS-COV-2 RDRP RESP QL NAA+PROBE: NEGATIVE
SODIUM SERPL-SCNC: 138 MMOL/L (ref 136–145)
SPECIMEN SOURCE: NORMAL
WBC # BLD AUTO: 6.73 K/UL (ref 3.9–12.7)

## 2021-12-08 PROCEDURE — 96375 TX/PRO/DX INJ NEW DRUG ADDON: CPT

## 2021-12-08 PROCEDURE — 96374 THER/PROPH/DIAG INJ IV PUSH: CPT

## 2021-12-08 PROCEDURE — 93010 EKG 12-LEAD: ICD-10-PCS | Mod: ,,, | Performed by: INTERNAL MEDICINE

## 2021-12-08 PROCEDURE — 93005 ELECTROCARDIOGRAM TRACING: CPT | Performed by: INTERNAL MEDICINE

## 2021-12-08 PROCEDURE — 85025 COMPLETE CBC W/AUTO DIFF WBC: CPT | Performed by: NURSE PRACTITIONER

## 2021-12-08 PROCEDURE — 87502 INFLUENZA DNA AMP PROBE: CPT | Performed by: NURSE PRACTITIONER

## 2021-12-08 PROCEDURE — 25000003 PHARM REV CODE 250: Performed by: EMERGENCY MEDICINE

## 2021-12-08 PROCEDURE — 83690 ASSAY OF LIPASE: CPT | Performed by: NURSE PRACTITIONER

## 2021-12-08 PROCEDURE — 25500020 PHARM REV CODE 255: Performed by: NURSE PRACTITIONER

## 2021-12-08 PROCEDURE — 93010 ELECTROCARDIOGRAM REPORT: CPT | Mod: ,,, | Performed by: INTERNAL MEDICINE

## 2021-12-08 PROCEDURE — 63600175 PHARM REV CODE 636 W HCPCS: Performed by: EMERGENCY MEDICINE

## 2021-12-08 PROCEDURE — U0002 COVID-19 LAB TEST NON-CDC: HCPCS | Performed by: NURSE PRACTITIONER

## 2021-12-08 PROCEDURE — 99285 EMERGENCY DEPT VISIT HI MDM: CPT | Mod: 25

## 2021-12-08 PROCEDURE — 36415 COLL VENOUS BLD VENIPUNCTURE: CPT | Performed by: NURSE PRACTITIONER

## 2021-12-08 PROCEDURE — 80053 COMPREHEN METABOLIC PANEL: CPT | Performed by: NURSE PRACTITIONER

## 2021-12-08 RX ORDER — DIPHENHYDRAMINE HYDROCHLORIDE 50 MG/ML
25 INJECTION INTRAMUSCULAR; INTRAVENOUS
Status: COMPLETED | OUTPATIENT
Start: 2021-12-08 | End: 2021-12-08

## 2021-12-08 RX ORDER — ONDANSETRON 4 MG/1
4 TABLET, FILM COATED ORAL EVERY 6 HOURS
Qty: 12 TABLET | Refills: 0 | Status: SHIPPED | OUTPATIENT
Start: 2021-12-08 | End: 2022-03-02

## 2021-12-08 RX ORDER — IRBESARTAN 300 MG/1
300 TABLET ORAL DAILY
COMMUNITY
End: 2024-04-02

## 2021-12-08 RX ORDER — DIPHENHYDRAMINE HYDROCHLORIDE 50 MG/ML
25 INJECTION INTRAMUSCULAR; INTRAVENOUS
Status: DISCONTINUED | OUTPATIENT
Start: 2021-12-08 | End: 2021-12-08 | Stop reason: HOSPADM

## 2021-12-08 RX ORDER — METOCLOPRAMIDE HYDROCHLORIDE 5 MG/ML
10 INJECTION INTRAMUSCULAR; INTRAVENOUS
Status: COMPLETED | OUTPATIENT
Start: 2021-12-08 | End: 2021-12-08

## 2021-12-08 RX ORDER — MORPHINE SULFATE 2 MG/ML
2 INJECTION, SOLUTION INTRAMUSCULAR; INTRAVENOUS
Status: DISCONTINUED | OUTPATIENT
Start: 2021-12-08 | End: 2021-12-08

## 2021-12-08 RX ORDER — MORPHINE SULFATE 2 MG/ML
2 INJECTION, SOLUTION INTRAMUSCULAR; INTRAVENOUS
Status: COMPLETED | OUTPATIENT
Start: 2021-12-08 | End: 2021-12-08

## 2021-12-08 RX ORDER — METOCLOPRAMIDE HYDROCHLORIDE 5 MG/ML
10 INJECTION INTRAMUSCULAR; INTRAVENOUS
Status: DISCONTINUED | OUTPATIENT
Start: 2021-12-08 | End: 2021-12-08 | Stop reason: HOSPADM

## 2021-12-08 RX ORDER — DIAZEPAM 2 MG/1
2 TABLET ORAL EVERY 6 HOURS PRN
COMMUNITY
End: 2024-04-02

## 2021-12-08 RX ADMIN — IOHEXOL 100 ML: 350 INJECTION, SOLUTION INTRAVENOUS at 04:12

## 2021-12-08 RX ADMIN — SODIUM CHLORIDE 500 ML: 0.9 INJECTION, SOLUTION INTRAVENOUS at 06:12

## 2021-12-08 RX ADMIN — METOCLOPRAMIDE 10 MG: 5 INJECTION, SOLUTION INTRAMUSCULAR; INTRAVENOUS at 06:12

## 2021-12-08 RX ADMIN — MORPHINE SULFATE 2 MG: 2 INJECTION, SOLUTION INTRAMUSCULAR; INTRAVENOUS at 06:12

## 2021-12-08 RX ADMIN — DIPHENHYDRAMINE HYDROCHLORIDE 25 MG: 50 INJECTION INTRAMUSCULAR; INTRAVENOUS at 06:12

## 2022-01-28 ENCOUNTER — OFFICE VISIT (OUTPATIENT)
Dept: SURGERY | Facility: CLINIC | Age: 62
End: 2022-01-28
Payer: MEDICARE

## 2022-01-28 VITALS
WEIGHT: 125.25 LBS | TEMPERATURE: 98 F | HEART RATE: 92 BPM | RESPIRATION RATE: 16 BRPM | BODY MASS INDEX: 20.13 KG/M2 | DIASTOLIC BLOOD PRESSURE: 78 MMHG | HEIGHT: 66 IN | SYSTOLIC BLOOD PRESSURE: 135 MMHG

## 2022-01-28 DIAGNOSIS — K45.8 OTHER SPECIFIED ABDOMINAL HERNIA WITHOUT OBSTRUCTION OR GANGRENE: Primary | ICD-10-CM

## 2022-01-28 DIAGNOSIS — Z86.73 H/O TIA (TRANSIENT ISCHEMIC ATTACK) AND STROKE: ICD-10-CM

## 2022-01-28 PROCEDURE — 99999 PR PBB SHADOW E&M-EST. PATIENT-LVL IV: CPT | Mod: PBBFAC,,, | Performed by: SURGERY

## 2022-01-28 PROCEDURE — 99999 PR PBB SHADOW E&M-EST. PATIENT-LVL IV: ICD-10-PCS | Mod: PBBFAC,,, | Performed by: SURGERY

## 2022-01-28 PROCEDURE — 99203 OFFICE O/P NEW LOW 30 MIN: CPT | Mod: S$GLB,,, | Performed by: SURGERY

## 2022-01-28 PROCEDURE — 99203 PR OFFICE/OUTPT VISIT, NEW, LEVL III, 30-44 MIN: ICD-10-PCS | Mod: S$GLB,,, | Performed by: SURGERY

## 2022-01-28 NOTE — H&P
GENERAL SURGERY  OUTPATIENT H&P    REASON FOR VISIT/CC:  Hernia    HPI: Courtney Reilly is a 62 y.o. female with a history of multiple back surgeries who has developed a bulge along the left flank present for at least 2 years.  The bulge has become more noticeable assure loses weight.  Reports she underwent an ultrasound previously which showed a hernia likely containing bowel.  She denies obstructive symptoms but does report the area becomes larger which she has to have a bowel movement.  Denies overlying skin changes.  Never has had attempt at repair of this hernia.  No significant intra-abdominal surgeries.  Has had a previous spine stimulator placed with the tubing still in the subcutaneous tissue running adjacent to the bulge.    I have reviewed the patient's chart including prior progress notes, procedures and testing.     ROS:   Review of Systems   Constitutional: Negative for activity change, chills and fever.   Respiratory: Negative for apnea, chest tightness and shortness of breath.    Gastrointestinal: Negative for abdominal distention, abdominal pain, constipation, nausea and vomiting.   Musculoskeletal: Positive for back pain, neck pain and neck stiffness.   Neurological: Negative for dizziness, weakness and light-headedness.       PROBLEM LIST:  Patient Active Problem List   Diagnosis    Multiple sclerosis    Fibromyalgia    Restless legs syndrome (RLS)    Chronic back pain    Fatigue    Herniated disc    Spasticity    Radiculopathy    Stroke    Other and unspecified ovarian cyst    Flank pain    OAB (overactive bladder)    Breast pain    Drug-induced constipation    Chronic pain syndrome    Lumbar post-laminectomy syndrome    Chronic pain    Acute right-sided thoracic back pain    Hx-TIA (transient ischemic attack)    Essential hypertension    Tobacco abuse    Mixed hyperlipidemia         HISTORY  Past Medical History:   Diagnosis Date    Arthritis     Chronic back pain      Encounter for blood transfusion     Fatigue     Fibromyalgia     GERD (gastroesophageal reflux disease)     Herniated disc     Multiple sclerosis     Ovarian tumor     Radiculopathy     Restless legs syndrome (RLS)     Spasticity     Stroke     twice    TIA (transient ischemic attack)        Past Surgical History:   Procedure Laterality Date    CEREBRAL ANEURYSM REPAIR      HYSTERECTOMY      age 27    KNEE SURGERY  2008    left    pain pump implant      MT REMOVAL OF OVARY/TUBE(S)      SPINE SURGERY  2006      2 on neck.  2 on lumbar spine    TONSILLECTOMY      WRIST SURGERY  2007    right       Social History     Tobacco Use    Smoking status: Current Every Day Smoker     Packs/day: 0.50     Years: 30.00     Pack years: 15.00     Types: Cigarettes    Smokeless tobacco: Never Used    Tobacco comment: .    Occup:   disabled.   , lives alone.     Substance Use Topics    Alcohol use: Yes     Comment: socially    Drug use: No       Family History   Problem Relation Age of Onset    Cancer Mother         Lung cancer    Cancer Father         Lung cancer         MEDS:  Current Outpatient Medications on File Prior to Visit   Medication Sig Dispense Refill    aspirin (ECOTRIN) 81 MG EC tablet Take 1 tablet (81 mg total) by mouth once daily. 90 tablet 3    celecoxib (CELEBREX) 200 MG capsule Take 200 mg by mouth once daily.      cholecalciferol, vitamin D3, (VITAMIN D3) 25 mcg (1,000 unit) capsule Take 1,000 Units by mouth once daily.      diazePAM (VALIUM) 2 MG tablet Take 2 mg by mouth every 6 (six) hours as needed (spasms).      hydrocodone-acetaminophen 7.5-325mg (NORCO) 7.5-325 mg per tablet Take 1 tablet by mouth every 6 (six) hours as needed for Pain.      irbesartan (AVAPRO) 300 MG tablet Take 300 mg by mouth once daily.      omeprazole (PRILOSEC) 20 MG capsule       ondansetron (ZOFRAN) 4 MG tablet Take 1 tablet (4 mg total) by mouth every 6 (six) hours. 12 tablet 0     QUETIAPINE FUMARATE (SEROQUEL ORAL) Take by mouth.      solifenacin (VESICARE) 5 MG tablet Take 10 mg by mouth once daily.      vitamin E 400 UNIT capsule Take 400 Units by mouth once daily.      zolpidem (AMBIEN) 10 mg Tab Take 5 mg by mouth nightly as needed.      lisinopril (PRINIVIL,ZESTRIL) 2.5 MG tablet Take 1 tablet (2.5 mg total) by mouth once daily. 90 tablet 3     No current facility-administered medications on file prior to visit.       ALLERGIES:  Review of patient's allergies indicates:   Allergen Reactions    Bee pollens     Bee sting kit Hives    Mushroom flavor          VITALS:  Vitals:    01/28/22 1113   BP: 135/78   Pulse: 92   Resp: 16   Temp: 98 °F (36.7 °C)         PHYSICAL EXAM:  Physical Exam  Vitals reviewed.   Constitutional:       General: She is not in acute distress.     Appearance: Normal appearance. She is well-developed.   HENT:      Nose: Nose normal.   Eyes:      General: No scleral icterus.     Conjunctiva/sclera: Conjunctivae normal.   Neck:      Trachea: No tracheal tenderness or tracheal deviation.   Cardiovascular:      Rate and Rhythm: Normal rate and regular rhythm.      Pulses: Normal pulses.   Pulmonary:      Effort: Pulmonary effort is normal. No accessory muscle usage or respiratory distress.      Breath sounds: Normal breath sounds.   Abdominal:      General: There is no distension.      Palpations: Abdomen is soft.      Tenderness: There is no abdominal tenderness.      Hernia: A hernia is present.       Musculoskeletal:         General: No swelling or tenderness. Normal range of motion.      Cervical back: Normal range of motion and neck supple. No rigidity.   Skin:     General: Skin is warm and dry.      Coloration: Skin is not jaundiced.      Findings: No erythema.   Neurological:      General: No focal deficit present.      Mental Status: She is alert and oriented to person, place, and time.      Motor: No weakness or abnormal muscle tone.   Psychiatric:          Mood and Affect: Mood normal.         Behavior: Behavior normal.         Thought Content: Thought content normal.         Judgment: Judgment normal.           LABS:  Lab Results   Component Value Date    WBC 6.73 12/08/2021    RBC 4.47 12/08/2021    HGB 14.2 12/08/2021    HCT 42.3 12/08/2021     12/08/2021     Lab Results   Component Value Date     12/08/2021     12/08/2021    K 3.8 12/08/2021     12/08/2021    CO2 24 12/08/2021    BUN 8 12/08/2021    CREATININE 0.9 12/08/2021    CALCIUM 9.9 12/08/2021     Lab Results   Component Value Date    ALT 13 12/08/2021    AST 20 12/08/2021    ALKPHOS 77 12/08/2021    BILITOT 0.6 12/08/2021     Lab Results   Component Value Date    MG 2.3 04/02/2018       STUDIES:  Images and reports were personally reviewed.        ASSESSMENT & PLAN:  62 y.o. female with left flank hernia, likely incisional  - hernia is reducible but does have bowel sounds present  - discussed potential treatment options including observation versus intervention, patient is seeking surgical intervention prevent worsening specially since about present  - I have recommended CT imaging to further delineate the exact location of the hernia and the muscle layers involved  - almost certainly be a candidate for robotic repair with mesh, risk of the procedure were discussed with the patient as well as indication for minimally invasive approach  - patient is in agreement with above plan, will obtain CT scan at which time we will call her with results and schedule surgical intervention

## 2022-01-28 NOTE — H&P (VIEW-ONLY)
GENERAL SURGERY  OUTPATIENT H&P    REASON FOR VISIT/CC:  Hernia    HPI: Courtney Reilly is a 62 y.o. female with a history of multiple back surgeries who has developed a bulge along the left flank present for at least 2 years.  The bulge has become more noticeable assure loses weight.  Reports she underwent an ultrasound previously which showed a hernia likely containing bowel.  She denies obstructive symptoms but does report the area becomes larger which she has to have a bowel movement.  Denies overlying skin changes.  Never has had attempt at repair of this hernia.  No significant intra-abdominal surgeries.  Has had a previous spine stimulator placed with the tubing still in the subcutaneous tissue running adjacent to the bulge.    I have reviewed the patient's chart including prior progress notes, procedures and testing.     ROS:   Review of Systems   Constitutional: Negative for activity change, chills and fever.   Respiratory: Negative for apnea, chest tightness and shortness of breath.    Gastrointestinal: Negative for abdominal distention, abdominal pain, constipation, nausea and vomiting.   Musculoskeletal: Positive for back pain, neck pain and neck stiffness.   Neurological: Negative for dizziness, weakness and light-headedness.       PROBLEM LIST:  Patient Active Problem List   Diagnosis    Multiple sclerosis    Fibromyalgia    Restless legs syndrome (RLS)    Chronic back pain    Fatigue    Herniated disc    Spasticity    Radiculopathy    Stroke    Other and unspecified ovarian cyst    Flank pain    OAB (overactive bladder)    Breast pain    Drug-induced constipation    Chronic pain syndrome    Lumbar post-laminectomy syndrome    Chronic pain    Acute right-sided thoracic back pain    Hx-TIA (transient ischemic attack)    Essential hypertension    Tobacco abuse    Mixed hyperlipidemia         HISTORY  Past Medical History:   Diagnosis Date    Arthritis     Chronic back pain      Encounter for blood transfusion     Fatigue     Fibromyalgia     GERD (gastroesophageal reflux disease)     Herniated disc     Multiple sclerosis     Ovarian tumor     Radiculopathy     Restless legs syndrome (RLS)     Spasticity     Stroke     twice    TIA (transient ischemic attack)        Past Surgical History:   Procedure Laterality Date    CEREBRAL ANEURYSM REPAIR      HYSTERECTOMY      age 27    KNEE SURGERY  2008    left    pain pump implant      MI REMOVAL OF OVARY/TUBE(S)      SPINE SURGERY  2006      2 on neck.  2 on lumbar spine    TONSILLECTOMY      WRIST SURGERY  2007    right       Social History     Tobacco Use    Smoking status: Current Every Day Smoker     Packs/day: 0.50     Years: 30.00     Pack years: 15.00     Types: Cigarettes    Smokeless tobacco: Never Used    Tobacco comment: .    Occup:   disabled.   , lives alone.     Substance Use Topics    Alcohol use: Yes     Comment: socially    Drug use: No       Family History   Problem Relation Age of Onset    Cancer Mother         Lung cancer    Cancer Father         Lung cancer         MEDS:  Current Outpatient Medications on File Prior to Visit   Medication Sig Dispense Refill    aspirin (ECOTRIN) 81 MG EC tablet Take 1 tablet (81 mg total) by mouth once daily. 90 tablet 3    celecoxib (CELEBREX) 200 MG capsule Take 200 mg by mouth once daily.      cholecalciferol, vitamin D3, (VITAMIN D3) 25 mcg (1,000 unit) capsule Take 1,000 Units by mouth once daily.      diazePAM (VALIUM) 2 MG tablet Take 2 mg by mouth every 6 (six) hours as needed (spasms).      hydrocodone-acetaminophen 7.5-325mg (NORCO) 7.5-325 mg per tablet Take 1 tablet by mouth every 6 (six) hours as needed for Pain.      irbesartan (AVAPRO) 300 MG tablet Take 300 mg by mouth once daily.      omeprazole (PRILOSEC) 20 MG capsule       ondansetron (ZOFRAN) 4 MG tablet Take 1 tablet (4 mg total) by mouth every 6 (six) hours. 12 tablet 0     QUETIAPINE FUMARATE (SEROQUEL ORAL) Take by mouth.      solifenacin (VESICARE) 5 MG tablet Take 10 mg by mouth once daily.      vitamin E 400 UNIT capsule Take 400 Units by mouth once daily.      zolpidem (AMBIEN) 10 mg Tab Take 5 mg by mouth nightly as needed.      lisinopril (PRINIVIL,ZESTRIL) 2.5 MG tablet Take 1 tablet (2.5 mg total) by mouth once daily. 90 tablet 3     No current facility-administered medications on file prior to visit.       ALLERGIES:  Review of patient's allergies indicates:   Allergen Reactions    Bee pollens     Bee sting kit Hives    Mushroom flavor          VITALS:  Vitals:    01/28/22 1113   BP: 135/78   Pulse: 92   Resp: 16   Temp: 98 °F (36.7 °C)         PHYSICAL EXAM:  Physical Exam  Vitals reviewed.   Constitutional:       General: She is not in acute distress.     Appearance: Normal appearance. She is well-developed.   HENT:      Nose: Nose normal.   Eyes:      General: No scleral icterus.     Conjunctiva/sclera: Conjunctivae normal.   Neck:      Trachea: No tracheal tenderness or tracheal deviation.   Cardiovascular:      Rate and Rhythm: Normal rate and regular rhythm.      Pulses: Normal pulses.   Pulmonary:      Effort: Pulmonary effort is normal. No accessory muscle usage or respiratory distress.      Breath sounds: Normal breath sounds.   Abdominal:      General: There is no distension.      Palpations: Abdomen is soft.      Tenderness: There is no abdominal tenderness.      Hernia: A hernia is present.       Musculoskeletal:         General: No swelling or tenderness. Normal range of motion.      Cervical back: Normal range of motion and neck supple. No rigidity.   Skin:     General: Skin is warm and dry.      Coloration: Skin is not jaundiced.      Findings: No erythema.   Neurological:      General: No focal deficit present.      Mental Status: She is alert and oriented to person, place, and time.      Motor: No weakness or abnormal muscle tone.   Psychiatric:          Mood and Affect: Mood normal.         Behavior: Behavior normal.         Thought Content: Thought content normal.         Judgment: Judgment normal.           LABS:  Lab Results   Component Value Date    WBC 6.73 12/08/2021    RBC 4.47 12/08/2021    HGB 14.2 12/08/2021    HCT 42.3 12/08/2021     12/08/2021     Lab Results   Component Value Date     12/08/2021     12/08/2021    K 3.8 12/08/2021     12/08/2021    CO2 24 12/08/2021    BUN 8 12/08/2021    CREATININE 0.9 12/08/2021    CALCIUM 9.9 12/08/2021     Lab Results   Component Value Date    ALT 13 12/08/2021    AST 20 12/08/2021    ALKPHOS 77 12/08/2021    BILITOT 0.6 12/08/2021     Lab Results   Component Value Date    MG 2.3 04/02/2018       STUDIES:  Images and reports were personally reviewed.        ASSESSMENT & PLAN:  62 y.o. female with left flank hernia, likely incisional  - hernia is reducible but does have bowel sounds present  - discussed potential treatment options including observation versus intervention, patient is seeking surgical intervention prevent worsening specially since about present  - I have recommended CT imaging to further delineate the exact location of the hernia and the muscle layers involved  - almost certainly be a candidate for robotic repair with mesh, risk of the procedure were discussed with the patient as well as indication for minimally invasive approach  - patient is in agreement with above plan, will obtain CT scan at which time we will call her with results and schedule surgical intervention

## 2022-02-03 ENCOUNTER — HOSPITAL ENCOUNTER (OUTPATIENT)
Dept: RADIOLOGY | Facility: HOSPITAL | Age: 62
Discharge: HOME | End: 2022-02-03
Attending: SURGERY
Payer: MEDICARE

## 2022-02-03 DIAGNOSIS — K45.8 OTHER SPECIFIED ABDOMINAL HERNIA WITHOUT OBSTRUCTION OR GANGRENE: ICD-10-CM

## 2022-02-03 PROCEDURE — A9698 NON-RAD CONTRAST MATERIALNOC: HCPCS | Performed by: SURGERY

## 2022-02-03 PROCEDURE — 74177 CT ABD & PELVIS W/CONTRAST: CPT | Mod: 26,,, | Performed by: RADIOLOGY

## 2022-02-03 PROCEDURE — 25500020 PHARM REV CODE 255: Performed by: SURGERY

## 2022-02-03 PROCEDURE — 74177 CT ABD & PELVIS W/CONTRAST: CPT | Mod: TC

## 2022-02-03 PROCEDURE — 74177 CT ABDOMEN PELVIS WITH CONTRAST: ICD-10-PCS | Mod: 26,,, | Performed by: RADIOLOGY

## 2022-02-03 RX ADMIN — IOHEXOL 75 ML: 350 INJECTION, SOLUTION INTRAVENOUS at 12:02

## 2022-02-03 RX ADMIN — IOHEXOL 1000 ML: 9 SOLUTION ORAL at 12:02

## 2022-02-09 DIAGNOSIS — K45.8 FLANK HERNIA: Primary | ICD-10-CM

## 2022-02-09 DIAGNOSIS — Z01.818 PRE-OP TESTING: ICD-10-CM

## 2022-02-11 DIAGNOSIS — Z01.818 PRE-OP TESTING: Primary | ICD-10-CM

## 2022-02-11 NOTE — PRE-PROCEDURE INSTRUCTIONS
Pre-op phone call made to pt.  All medications reviewed and  pre-procedure  instructions given to pt.  Pt verbalized understanding.  Info also put on [employer]

## 2022-02-12 ENCOUNTER — LAB VISIT (OUTPATIENT)
Dept: PRIMARY CARE CLINIC | Facility: CLINIC | Age: 62
End: 2022-02-12
Payer: MEDICARE

## 2022-02-12 DIAGNOSIS — Z01.818 PRE-OP TESTING: ICD-10-CM

## 2022-02-12 PROCEDURE — U0003 INFECTIOUS AGENT DETECTION BY NUCLEIC ACID (DNA OR RNA); SEVERE ACUTE RESPIRATORY SYNDROME CORONAVIRUS 2 (SARS-COV-2) (CORONAVIRUS DISEASE [COVID-19]), AMPLIFIED PROBE TECHNIQUE, MAKING USE OF HIGH THROUGHPUT TECHNOLOGIES AS DESCRIBED BY CMS-2020-01-R: HCPCS | Performed by: SURGERY

## 2022-02-12 PROCEDURE — U0005 INFEC AGEN DETEC AMPLI PROBE: HCPCS | Performed by: SURGERY

## 2022-02-13 LAB
SARS-COV-2 RNA RESP QL NAA+PROBE: NOT DETECTED
SARS-COV-2- CYCLE NUMBER: NORMAL

## 2022-02-14 ENCOUNTER — ANESTHESIA EVENT (OUTPATIENT)
Dept: SURGERY | Facility: HOSPITAL | Age: 62
End: 2022-02-14
Payer: MEDICARE

## 2022-02-15 ENCOUNTER — ANESTHESIA (OUTPATIENT)
Dept: SURGERY | Facility: HOSPITAL | Age: 62
End: 2022-02-15
Payer: MEDICARE

## 2022-02-15 ENCOUNTER — HOSPITAL ENCOUNTER (OUTPATIENT)
Facility: HOSPITAL | Age: 62
Discharge: HOME | End: 2022-02-15
Attending: SURGERY | Admitting: SURGERY
Payer: MEDICARE

## 2022-02-15 DIAGNOSIS — Z01.818 PRE-OP TESTING: ICD-10-CM

## 2022-02-15 DIAGNOSIS — K45.8 FLANK HERNIA: Primary | ICD-10-CM

## 2022-02-15 LAB
ALBUMIN SERPL BCP-MCNC: 4.3 G/DL (ref 3.5–5.2)
ALP SERPL-CCNC: 72 U/L (ref 55–135)
ALT SERPL W/O P-5'-P-CCNC: 18 U/L (ref 10–44)
ANION GAP SERPL CALC-SCNC: 9 MMOL/L (ref 8–16)
AST SERPL-CCNC: 19 U/L (ref 10–40)
BASOPHILS # BLD AUTO: 0.06 K/UL (ref 0–0.2)
BASOPHILS NFR BLD: 0.8 % (ref 0–1.9)
BILIRUB SERPL-MCNC: 0.4 MG/DL (ref 0.1–1)
BUN SERPL-MCNC: 12 MG/DL (ref 6–20)
CALCIUM SERPL-MCNC: 9.8 MG/DL (ref 8.7–10.5)
CHLORIDE SERPL-SCNC: 111 MMOL/L (ref 95–110)
CO2 SERPL-SCNC: 25 MMOL/L (ref 23–29)
CREAT SERPL-MCNC: 0.9 MG/DL (ref 0.5–1.4)
DIFFERENTIAL METHOD: ABNORMAL
EOSINOPHIL # BLD AUTO: 0.2 K/UL (ref 0–0.5)
EOSINOPHIL NFR BLD: 2.3 % (ref 0–8)
ERYTHROCYTE [DISTWIDTH] IN BLOOD BY AUTOMATED COUNT: 14.6 % (ref 11.5–14.5)
EST. GFR  (AFRICAN AMERICAN): >60 ML/MIN/1.73 M^2
EST. GFR  (NON AFRICAN AMERICAN): >60 ML/MIN/1.73 M^2
GLUCOSE SERPL-MCNC: 116 MG/DL (ref 70–110)
HCT VFR BLD AUTO: 39.7 % (ref 37–48.5)
HGB BLD-MCNC: 12.8 G/DL (ref 12–16)
IMM GRANULOCYTES # BLD AUTO: 0.03 K/UL (ref 0–0.04)
IMM GRANULOCYTES NFR BLD AUTO: 0.4 % (ref 0–0.5)
LYMPHOCYTES # BLD AUTO: 1.7 K/UL (ref 1–4.8)
LYMPHOCYTES NFR BLD: 20.9 % (ref 18–48)
MCH RBC QN AUTO: 32.3 PG (ref 27–31)
MCHC RBC AUTO-ENTMCNC: 32.2 G/DL (ref 32–36)
MCV RBC AUTO: 100 FL (ref 82–98)
MONOCYTES # BLD AUTO: 0.6 K/UL (ref 0.3–1)
MONOCYTES NFR BLD: 7.5 % (ref 4–15)
NEUTROPHILS # BLD AUTO: 5.4 K/UL (ref 1.8–7.7)
NEUTROPHILS NFR BLD: 68.1 % (ref 38–73)
NRBC BLD-RTO: 0 /100 WBC
PLATELET # BLD AUTO: 397 K/UL (ref 150–450)
PMV BLD AUTO: 8.5 FL (ref 9.2–12.9)
POTASSIUM SERPL-SCNC: 4.2 MMOL/L (ref 3.5–5.1)
PROT SERPL-MCNC: 6.9 G/DL (ref 6–8.4)
RBC # BLD AUTO: 3.96 M/UL (ref 4–5.4)
SODIUM SERPL-SCNC: 145 MMOL/L (ref 136–145)
WBC # BLD AUTO: 7.9 K/UL (ref 3.9–12.7)

## 2022-02-15 PROCEDURE — 25000003 PHARM REV CODE 250: Performed by: NURSE ANESTHETIST, CERTIFIED REGISTERED

## 2022-02-15 PROCEDURE — 27201423 OPTIME MED/SURG SUP & DEVICES STERILE SUPPLY: Performed by: SURGERY

## 2022-02-15 PROCEDURE — 63600175 PHARM REV CODE 636 W HCPCS: Performed by: SURGERY

## 2022-02-15 PROCEDURE — 36000711: Performed by: SURGERY

## 2022-02-15 PROCEDURE — 25000003 PHARM REV CODE 250: Performed by: ANESTHESIOLOGY

## 2022-02-15 PROCEDURE — D9220A PRA ANESTHESIA: Mod: ANES,,, | Performed by: ANESTHESIOLOGY

## 2022-02-15 PROCEDURE — 49654 PR LAP, INCISIONAL HERNIA REPAIR,REDUCIBLE: ICD-10-PCS | Mod: 22,,, | Performed by: SURGERY

## 2022-02-15 PROCEDURE — 63600175 PHARM REV CODE 636 W HCPCS: Performed by: ANESTHESIOLOGY

## 2022-02-15 PROCEDURE — 71000033 HC RECOVERY, INTIAL HOUR: Performed by: SURGERY

## 2022-02-15 PROCEDURE — 36000710: Performed by: SURGERY

## 2022-02-15 PROCEDURE — 37000009 HC ANESTHESIA EA ADD 15 MINS: Performed by: SURGERY

## 2022-02-15 PROCEDURE — 37000008 HC ANESTHESIA 1ST 15 MINUTES: Performed by: SURGERY

## 2022-02-15 PROCEDURE — 85025 COMPLETE CBC W/AUTO DIFF WBC: CPT | Performed by: SURGERY

## 2022-02-15 PROCEDURE — 49654 PR LAP, INCISIONAL HERNIA REPAIR,REDUCIBLE: CPT | Mod: 22,,, | Performed by: SURGERY

## 2022-02-15 PROCEDURE — D9220A PRA ANESTHESIA: Mod: CRNA,,, | Performed by: NURSE ANESTHETIST, CERTIFIED REGISTERED

## 2022-02-15 PROCEDURE — 25000003 PHARM REV CODE 250: Performed by: SURGERY

## 2022-02-15 PROCEDURE — C1781 MESH (IMPLANTABLE): HCPCS | Performed by: SURGERY

## 2022-02-15 PROCEDURE — 99900103 DSU ONLY-NO CHARGE-INITIAL HR (STAT): Performed by: SURGERY

## 2022-02-15 PROCEDURE — D9220A PRA ANESTHESIA: ICD-10-PCS | Mod: ANES,,, | Performed by: ANESTHESIOLOGY

## 2022-02-15 PROCEDURE — 80053 COMPREHEN METABOLIC PANEL: CPT | Performed by: SURGERY

## 2022-02-15 PROCEDURE — 71000039 HC RECOVERY, EACH ADD'L HOUR: Performed by: SURGERY

## 2022-02-15 PROCEDURE — 36415 COLL VENOUS BLD VENIPUNCTURE: CPT | Performed by: SURGERY

## 2022-02-15 PROCEDURE — 99900104 DSU ONLY-NO CHARGE-EA ADD'L HR (STAT): Performed by: SURGERY

## 2022-02-15 PROCEDURE — 63600175 PHARM REV CODE 636 W HCPCS: Performed by: NURSE ANESTHETIST, CERTIFIED REGISTERED

## 2022-02-15 PROCEDURE — D9220A PRA ANESTHESIA: ICD-10-PCS | Mod: CRNA,,, | Performed by: NURSE ANESTHETIST, CERTIFIED REGISTERED

## 2022-02-15 PROCEDURE — 71000015 HC POSTOP RECOV 1ST HR: Performed by: SURGERY

## 2022-02-15 DEVICE — MESH PARIETEX PROGRIP 15X15CM: Type: IMPLANTABLE DEVICE | Site: ABDOMEN | Status: FUNCTIONAL

## 2022-02-15 RX ORDER — CEFAZOLIN SODIUM 2 G/50ML
2 SOLUTION INTRAVENOUS
Status: COMPLETED | OUTPATIENT
Start: 2022-02-15 | End: 2022-02-15

## 2022-02-15 RX ORDER — ACETAMINOPHEN 10 MG/ML
INJECTION, SOLUTION INTRAVENOUS
Status: DISCONTINUED | OUTPATIENT
Start: 2022-02-15 | End: 2022-02-15

## 2022-02-15 RX ORDER — FENTANYL CITRATE 50 UG/ML
25 INJECTION, SOLUTION INTRAMUSCULAR; INTRAVENOUS EVERY 5 MIN PRN
Status: COMPLETED | OUTPATIENT
Start: 2022-02-15 | End: 2022-02-15

## 2022-02-15 RX ORDER — CELECOXIB 200 MG/1
200 CAPSULE ORAL DAILY
Start: 2022-02-21

## 2022-02-15 RX ORDER — SODIUM CHLORIDE 0.9 % (FLUSH) 0.9 %
3 SYRINGE (ML) INJECTION EVERY 8 HOURS
Status: DISCONTINUED | OUTPATIENT
Start: 2022-02-15 | End: 2022-02-15 | Stop reason: HOSPADM

## 2022-02-15 RX ORDER — OXYCODONE HYDROCHLORIDE 5 MG/1
5 TABLET ORAL
Status: DISCONTINUED | OUTPATIENT
Start: 2022-02-15 | End: 2022-02-15 | Stop reason: HOSPADM

## 2022-02-15 RX ORDER — LIDOCAINE HYDROCHLORIDE 10 MG/ML
0.5 INJECTION, SOLUTION EPIDURAL; INFILTRATION; INTRACAUDAL; PERINEURAL ONCE
Status: COMPLETED | OUTPATIENT
Start: 2022-02-15 | End: 2022-02-15

## 2022-02-15 RX ORDER — SODIUM CHLORIDE 0.9 % (FLUSH) 0.9 %
3 SYRINGE (ML) INJECTION
Status: DISCONTINUED | OUTPATIENT
Start: 2022-02-15 | End: 2022-02-15 | Stop reason: HOSPADM

## 2022-02-15 RX ORDER — PROPOFOL 10 MG/ML
VIAL (ML) INTRAVENOUS
Status: DISCONTINUED | OUTPATIENT
Start: 2022-02-15 | End: 2022-02-15

## 2022-02-15 RX ORDER — SUCCINYLCHOLINE CHLORIDE 20 MG/ML
INJECTION INTRAMUSCULAR; INTRAVENOUS
Status: DISCONTINUED | OUTPATIENT
Start: 2022-02-15 | End: 2022-02-15

## 2022-02-15 RX ORDER — NEOSTIGMINE METHYLSULFATE 1 MG/ML
INJECTION, SOLUTION INTRAVENOUS
Status: DISCONTINUED | OUTPATIENT
Start: 2022-02-15 | End: 2022-02-15

## 2022-02-15 RX ORDER — OXYCODONE HYDROCHLORIDE 10 MG/1
10 TABLET ORAL EVERY 4 HOURS PRN
Qty: 28 TABLET | Refills: 0 | Status: SHIPPED | OUTPATIENT
Start: 2022-02-15 | End: 2024-04-02

## 2022-02-15 RX ORDER — IBUPROFEN 200 MG
600 TABLET ORAL EVERY 8 HOURS
Refills: 0 | COMMUNITY
Start: 2022-02-15 | End: 2022-02-18

## 2022-02-15 RX ORDER — BUPIVACAINE HYDROCHLORIDE AND EPINEPHRINE 2.5; 5 MG/ML; UG/ML
INJECTION, SOLUTION EPIDURAL; INFILTRATION; INTRACAUDAL; PERINEURAL
Status: DISCONTINUED | OUTPATIENT
Start: 2022-02-15 | End: 2022-02-15 | Stop reason: HOSPADM

## 2022-02-15 RX ORDER — LIDOCAINE HCL/PF 100 MG/5ML
SYRINGE (ML) INTRAVENOUS
Status: DISCONTINUED | OUTPATIENT
Start: 2022-02-15 | End: 2022-02-15

## 2022-02-15 RX ORDER — DIPHENHYDRAMINE HYDROCHLORIDE 50 MG/ML
25 INJECTION INTRAMUSCULAR; INTRAVENOUS EVERY 6 HOURS PRN
Status: DISCONTINUED | OUTPATIENT
Start: 2022-02-15 | End: 2022-02-15 | Stop reason: HOSPADM

## 2022-02-15 RX ORDER — SODIUM CHLORIDE, SODIUM LACTATE, POTASSIUM CHLORIDE, CALCIUM CHLORIDE 600; 310; 30; 20 MG/100ML; MG/100ML; MG/100ML; MG/100ML
INJECTION, SOLUTION INTRAVENOUS CONTINUOUS
Status: DISCONTINUED | OUTPATIENT
Start: 2022-02-15 | End: 2022-02-15 | Stop reason: HOSPADM

## 2022-02-15 RX ORDER — ACETAMINOPHEN 500 MG
1000 TABLET ORAL EVERY 8 HOURS
Refills: 0 | COMMUNITY
Start: 2022-02-15 | End: 2022-02-18

## 2022-02-15 RX ORDER — HYDROCODONE BITARTRATE AND ACETAMINOPHEN 7.5; 325 MG/1; MG/1
1 TABLET ORAL EVERY 6 HOURS PRN
Refills: 0
Start: 2022-02-21 | End: 2024-04-02

## 2022-02-15 RX ORDER — ONDANSETRON HYDROCHLORIDE 2 MG/ML
INJECTION, SOLUTION INTRAMUSCULAR; INTRAVENOUS
Status: DISCONTINUED | OUTPATIENT
Start: 2022-02-15 | End: 2022-02-15

## 2022-02-15 RX ORDER — FENTANYL CITRATE 50 UG/ML
INJECTION, SOLUTION INTRAMUSCULAR; INTRAVENOUS
Status: DISCONTINUED | OUTPATIENT
Start: 2022-02-15 | End: 2022-02-15

## 2022-02-15 RX ORDER — MIDAZOLAM HYDROCHLORIDE 1 MG/ML
INJECTION INTRAMUSCULAR; INTRAVENOUS
Status: DISCONTINUED | OUTPATIENT
Start: 2022-02-15 | End: 2022-02-15

## 2022-02-15 RX ORDER — ROCURONIUM BROMIDE 10 MG/ML
INJECTION, SOLUTION INTRAVENOUS
Status: DISCONTINUED | OUTPATIENT
Start: 2022-02-15 | End: 2022-02-15

## 2022-02-15 RX ORDER — MEPERIDINE HYDROCHLORIDE 50 MG/ML
12.5 INJECTION INTRAMUSCULAR; INTRAVENOUS; SUBCUTANEOUS ONCE AS NEEDED
Status: DISCONTINUED | OUTPATIENT
Start: 2022-02-15 | End: 2022-02-15 | Stop reason: HOSPADM

## 2022-02-15 RX ORDER — PHENYLEPHRINE HYDROCHLORIDE 10 MG/ML
INJECTION INTRAVENOUS
Status: DISCONTINUED | OUTPATIENT
Start: 2022-02-15 | End: 2022-02-15

## 2022-02-15 RX ORDER — DEXAMETHASONE SODIUM PHOSPHATE 4 MG/ML
INJECTION, SOLUTION INTRA-ARTICULAR; INTRALESIONAL; INTRAMUSCULAR; INTRAVENOUS; SOFT TISSUE
Status: DISCONTINUED | OUTPATIENT
Start: 2022-02-15 | End: 2022-02-15

## 2022-02-15 RX ORDER — ONDANSETRON 2 MG/ML
4 INJECTION INTRAMUSCULAR; INTRAVENOUS DAILY PRN
Status: DISCONTINUED | OUTPATIENT
Start: 2022-02-15 | End: 2022-02-15 | Stop reason: HOSPADM

## 2022-02-15 RX ORDER — HYDROMORPHONE HYDROCHLORIDE 2 MG/ML
0.2 INJECTION, SOLUTION INTRAMUSCULAR; INTRAVENOUS; SUBCUTANEOUS EVERY 5 MIN PRN
Status: DISCONTINUED | OUTPATIENT
Start: 2022-02-15 | End: 2022-02-15 | Stop reason: HOSPADM

## 2022-02-15 RX ADMIN — FENTANYL CITRATE 25 MCG: 50 INJECTION INTRAMUSCULAR; INTRAVENOUS at 01:02

## 2022-02-15 RX ADMIN — CEFAZOLIN SODIUM 2 G: 2 SOLUTION INTRAVENOUS at 11:02

## 2022-02-15 RX ADMIN — SODIUM CHLORIDE, SODIUM GLUCONATE, SODIUM ACETATE, POTASSIUM CHLORIDE, MAGNESIUM CHLORIDE, SODIUM PHOSPHATE, DIBASIC, AND POTASSIUM PHOSPHATE: .53; .5; .37; .037; .03; .012; .00082 INJECTION, SOLUTION INTRAVENOUS at 08:02

## 2022-02-15 RX ADMIN — OXYCODONE 5 MG: 5 TABLET ORAL at 12:02

## 2022-02-15 RX ADMIN — HYDROMORPHONE HYDROCHLORIDE 0.2 MG: 2 INJECTION INTRAMUSCULAR; INTRAVENOUS; SUBCUTANEOUS at 01:02

## 2022-02-15 RX ADMIN — DEXAMETHASONE SODIUM PHOSPHATE 4 MG: 4 INJECTION, SOLUTION INTRA-ARTICULAR; INTRALESIONAL; INTRAMUSCULAR; INTRAVENOUS; SOFT TISSUE at 11:02

## 2022-02-15 RX ADMIN — PHENYLEPHRINE HYDROCHLORIDE 100 MCG: 10 INJECTION INTRAVENOUS at 12:02

## 2022-02-15 RX ADMIN — ACETAMINOPHEN 1000 MG: 10 INJECTION, SOLUTION INTRAVENOUS at 11:02

## 2022-02-15 RX ADMIN — ONDANSETRON 4 MG: 2 INJECTION, SOLUTION INTRAMUSCULAR; INTRAVENOUS at 12:02

## 2022-02-15 RX ADMIN — PHENYLEPHRINE HYDROCHLORIDE 200 MCG: 10 INJECTION INTRAVENOUS at 11:02

## 2022-02-15 RX ADMIN — PHENYLEPHRINE HYDROCHLORIDE 100 MCG: 10 INJECTION INTRAVENOUS at 11:02

## 2022-02-15 RX ADMIN — PHENYLEPHRINE HYDROCHLORIDE 300 MCG: 10 INJECTION INTRAVENOUS at 11:02

## 2022-02-15 RX ADMIN — SUCCINYLCHOLINE CHLORIDE 120 MG: 20 INJECTION, SOLUTION INTRAMUSCULAR; INTRAVENOUS; PARENTERAL at 10:02

## 2022-02-15 RX ADMIN — PHENYLEPHRINE HYDROCHLORIDE 200 MCG: 10 INJECTION INTRAVENOUS at 12:02

## 2022-02-15 RX ADMIN — LIDOCAINE HYDROCHLORIDE 75 MG: 20 INJECTION INTRAVENOUS at 10:02

## 2022-02-15 RX ADMIN — MIDAZOLAM HYDROCHLORIDE 2 MG: 1 INJECTION, SOLUTION INTRAMUSCULAR; INTRAVENOUS at 10:02

## 2022-02-15 RX ADMIN — ROCURONIUM BROMIDE 5 MG: 10 INJECTION, SOLUTION INTRAVENOUS at 10:02

## 2022-02-15 RX ADMIN — GLYCOPYRROLATE 0.4 MG: 0.2 INJECTION, SOLUTION INTRAMUSCULAR; INTRAVENOUS at 12:02

## 2022-02-15 RX ADMIN — NEOSTIGMINE METHYLSULFATE 3 MG: 1 INJECTION INTRAVENOUS at 12:02

## 2022-02-15 RX ADMIN — ROCURONIUM BROMIDE 20 MG: 10 INJECTION, SOLUTION INTRAVENOUS at 11:02

## 2022-02-15 RX ADMIN — SODIUM CHLORIDE, SODIUM GLUCONATE, SODIUM ACETATE, POTASSIUM CHLORIDE, MAGNESIUM CHLORIDE, SODIUM PHOSPHATE, DIBASIC, AND POTASSIUM PHOSPHATE: .53; .5; .37; .037; .03; .012; .00082 INJECTION, SOLUTION INTRAVENOUS at 11:02

## 2022-02-15 RX ADMIN — FENTANYL CITRATE 100 MCG: 50 INJECTION, SOLUTION INTRAMUSCULAR; INTRAVENOUS at 10:02

## 2022-02-15 RX ADMIN — PROPOFOL 150 MG: 10 INJECTION, EMULSION INTRAVENOUS at 10:02

## 2022-02-15 NOTE — PLAN OF CARE
Dropped off by son, plan of care reviewed and warm blanket provided. Security notified for wallet contents

## 2022-02-15 NOTE — DISCHARGE SUMMARY
[facility]  Discharge Note  Short Stay    Procedure(s) (LRB):  ROBOTIC REPAIR, HERNIA, INCISIONAL - LEFT FLANK (Left)    OUTCOME: Patient tolerated treatment/procedure well without complication and is now ready for discharge.    DISPOSITION: Home or Self Care    FINAL DIAGNOSIS:  <principal problem not specified>    FOLLOWUP: In clinic    DISCHARGE INSTRUCTIONS:    Discharge Procedure Orders   Diet Adult Regular     Ice to affected area     Lifting restrictions   Order Comments: Please avoid lifting greater than 20 lb, straining, strenuous activity for six weeks.     Notify your health care provider if you experience any of the following:  temperature >100.4     Notify your health care provider if you experience any of the following:  persistent nausea and vomiting or diarrhea     Notify your health care provider if you experience any of the following:  severe uncontrolled pain     Notify your health care provider if you experience any of the following:  redness, tenderness, or signs of infection (pain, swelling, redness, odor or green/yellow discharge around incision site)     Notify your health care provider if you experience any of the following:  worsening rash     Notify your health care provider if you experience any of the following:  increased confusion or weakness     Change dressing (specify)   Order Comments: Post-Operative Wound Care    If a surgical glue has been placed over your incisions, please leave the glue in place and do not attempt to remove it.  It is ok to shower using mild soap and water over the incisions the day after your procedure. Pat dry your incisions. Do not soak in a bathtub or other body of water for 2 weeks or until cleared by your surgeon.     If a gauze bandage has been taped down over your incision or hernia site, please leave in place for 2 days. It is ok to carefully shower around the incision but please avoid getting the tape or adhesive dressing  saturated. After 2 days gently remove the bandage. Once the bandage is removed you may gently wash over the incision in the shower using soap and water then pat dry. Do not soak in a bathtub or other body of water for 2 weeks or until cleared by your surgeon.     If you noticed redness, swelling, fever, increasing pain or significant drainage from your wound please call/message the office or the 24 hr nurse hotline after hours.     Shower on day dressing removed (No bath)        TIME SPENT ON DISCHARGE: 10 minutes

## 2022-02-15 NOTE — PLAN OF CARE
Patient tolerated procedure and anesthesia well.  No complaints.  No apparent distress noted or reported. Vitals stable.  Denies pain denies nausea.  Tolerates po.  Discharge instructions reviewed with patient.  Verbalized understanding.  Consents with chart.

## 2022-02-15 NOTE — ANESTHESIA PREPROCEDURE EVALUATION
02/15/2022  Courtney Reilly is a 62 y.o., female.    Anesthesia Evaluation    I have reviewed the Patient Summary Reports.    I have reviewed the Nursing Notes. I have reviewed the NPO Status.   I have reviewed the Medications.     Review of Systems  Anesthesia Hx:  No problems with previous Anesthesia    Social:  Smoker    Cardiovascular:   Hypertension    Hepatic/GI:   GERD Flank hernia    Musculoskeletal:   Arthritis   Spine Disorders: lumbar Chronic Pain    Neurological:   TIA, CVA Neuromuscular Disease, MS  Fibromyalgia        Physical Exam  General:  Well nourished    Airway/Jaw/Neck:  Airway Findings: Mouth Opening: Normal Tongue: Normal  General Airway Assessment: Adult  Mallampati: II  TM Distance: Normal, at least 6 cm     Eyes/Ears/Nose:  EYES/EARS/NOSE FINDINGS: Normal   Dental:  Dental Findings: Edentulous, Upper Dentures, Lower Dentures   Chest/Lungs:  Chest/Lungs Findings: Normal Respiratory Rate, Clear to auscultation     Heart/Vascular:  Heart Findings: Rate: Normal  Rhythm: Regular Rhythm        Mental Status:  Mental Status Findings: Normal        Anesthesia Plan  Type of Anesthesia, risks & benefits discussed:  Anesthesia Type:  general    Patient's Preference:   Plan Factors:          Intra-op Monitoring Plan: standard ASA monitors  Intra-op Monitoring Plan Comments:   Post Op Pain Control Plan: multimodal analgesia and IV/PO Opioids PRN  Post Op Pain Control Plan Comments:     Induction:   IV  Beta Blocker:  Patient is not currently on a Beta-Blocker (No further documentation required).       Informed Consent: Patient understands risks and agrees with Anesthesia plan.  Questions answered. Anesthesia consent signed with patient.  ASA Score: 3     Day of Surgery Review of History & Physical: I have interviewed and examined the patient. I have reviewed the patient's H&P dated:    H&P update  referred to the surgeon.         Ready For Surgery From Anesthesia Perspective.

## 2022-02-15 NOTE — ANESTHESIA POSTPROCEDURE EVALUATION
Anesthesia Post Evaluation    Patient: Courtney CURRAN Reilly    Procedure(s) Performed: Procedure(s) (LRB):  ROBOTIC REPAIR, HERNIA, INCISIONAL - LEFT FLANK (Left)    Final Anesthesia Type: general      Patient location during evaluation: PACU  Patient participation: Yes- Able to Participate  Level of consciousness: awake and alert  Post-procedure vital signs: reviewed and stable  Pain management: adequate  Airway patency: patent    PONV status at discharge: No PONV  Anesthetic complications: no      Cardiovascular status: hemodynamically stable  Respiratory status: unassisted and room air  Hydration status: euvolemic  Follow-up not needed.          Vitals Value Taken Time   /79 02/15/22 1411   Temp 36.2 °C (97.2 °F) 02/15/22 1245   Pulse 74 02/15/22 1411   Resp 18 02/15/22 1411   SpO2 95 % 02/15/22 1411         Event Time   Out of Recovery 13:55:00         Pain/Laila Score: Pain Rating Prior to Med Admin: 6 (2/15/2022  1:43 PM)  Pain Rating Post Med Admin: 0 (2/15/2022  1:55 PM)  Laila Score: 10 (2/15/2022  1:45 PM)  Modified Laila Score: 20 (2/15/2022  2:54 PM)

## 2022-02-15 NOTE — TRANSFER OF CARE
"Anesthesia Transfer of Care Note    Patient: Courtney CURRAN Reilly    Procedure(s) Performed: Procedure(s) (LRB):  ROBOTIC REPAIR, HERNIA, INCISIONAL - LEFT FLANK (Left)    Patient location: PACU    Anesthesia Type: general    Transport from OR: Transported from OR on 2-3 L/min O2 by NC with adequate spontaneous ventilation    Post pain: adequate analgesia    Post assessment: no apparent anesthetic complications and tolerated procedure well    Post vital signs: stable    Level of consciousness: responds to stimulation and lethargic    Nausea/Vomiting: no nausea/vomiting    Complications: none    Transfer of care protocol was followed      Last vitals:   Visit Vitals  BP (!) 169/99   Pulse 81   Temp 36.3 °C (97.4 °F) (Temporal)   Resp 19   Ht 5' 6" (1.676 m)   Wt 56.7 kg (125 lb)   SpO2 100%   Breastfeeding No   BMI 20.18 kg/m²     "

## 2022-02-15 NOTE — ANESTHESIA PROCEDURE NOTES
Intubation    Date/Time: 2/15/2022 10:57 AM  Performed by: Micah Mahan CRNA  Authorized by: Deacon Arguello MD     Intubation:     Induction:  Intravenous    Intubated:  Postinduction    Mask Ventilation:  Easy mask    Attempts:  1    Attempted By:  Student    Method of Intubation:  Direct    Blade:  Marshall 3    Laryngeal View Grade: Grade IIb - only the arytenoids and epiglottis seen      Difficult Airway Encountered?: No      Complications:  None    Airway Device:  Oral endotracheal tube    Airway Device Size:  7.0    Style/Cuff Inflation:  Cuffed    Inflation Amount (mL):  5    Tube secured:  22    Secured at:  The lips    Placement Verified By:  Capnometry    Complicating Factors:  None    Findings Post-Intubation:  BS equal bilateral

## 2022-02-15 NOTE — PLAN OF CARE
Dr. Arguello at bedside and looking at patient's left eye.  Patient complaining that it feels scratched.  NS flush and 2x2 gauze with tape over left eye.  Julia, rn

## 2022-02-15 NOTE — PLAN OF CARE
Patient transferred to postop at this time.  AAOX3.  NAD noted.  Tolerating po intake well with no complaints of nausea/vomiting.  Pain 5/10.  Dressing remains clean, dry and intact to abdomen.  Due to void.

## 2022-02-17 VITALS
SYSTOLIC BLOOD PRESSURE: 135 MMHG | WEIGHT: 125 LBS | HEIGHT: 66 IN | DIASTOLIC BLOOD PRESSURE: 79 MMHG | RESPIRATION RATE: 18 BRPM | HEART RATE: 74 BPM | OXYGEN SATURATION: 95 % | BODY MASS INDEX: 20.09 KG/M2 | TEMPERATURE: 97 F

## 2022-03-02 ENCOUNTER — OFFICE VISIT (OUTPATIENT)
Dept: SURGERY | Facility: CLINIC | Age: 62
End: 2022-03-02
Payer: MEDICARE

## 2022-03-02 VITALS — HEART RATE: 97 BPM | SYSTOLIC BLOOD PRESSURE: 139 MMHG | TEMPERATURE: 97 F | DIASTOLIC BLOOD PRESSURE: 92 MMHG

## 2022-03-02 DIAGNOSIS — R10.32 LEFT LOWER QUADRANT ABDOMINAL PAIN: ICD-10-CM

## 2022-03-02 DIAGNOSIS — Z87.19 STATUS POST HERNIA REPAIR: Primary | ICD-10-CM

## 2022-03-02 DIAGNOSIS — Z98.890 STATUS POST HERNIA REPAIR: Primary | ICD-10-CM

## 2022-03-02 PROCEDURE — 3080F PR MOST RECENT DIASTOLIC BLOOD PRESSURE >= 90 MM HG: ICD-10-PCS | Mod: CPTII,S$GLB,, | Performed by: SURGERY

## 2022-03-02 PROCEDURE — 3075F PR MOST RECENT SYSTOLIC BLOOD PRESS GE 130-139MM HG: ICD-10-PCS | Mod: CPTII,S$GLB,, | Performed by: SURGERY

## 2022-03-02 PROCEDURE — 99024 POSTOP FOLLOW-UP VISIT: CPT | Mod: S$GLB,,, | Performed by: SURGERY

## 2022-03-02 PROCEDURE — 99024 PR POST-OP FOLLOW-UP VISIT: ICD-10-PCS | Mod: S$GLB,,, | Performed by: SURGERY

## 2022-03-02 PROCEDURE — 3080F DIAST BP >= 90 MM HG: CPT | Mod: CPTII,S$GLB,, | Performed by: SURGERY

## 2022-03-02 PROCEDURE — 3075F SYST BP GE 130 - 139MM HG: CPT | Mod: CPTII,S$GLB,, | Performed by: SURGERY

## 2022-03-02 RX ORDER — BACLOFEN 10 MG/1
10 TABLET ORAL 3 TIMES DAILY
Qty: 30 TABLET | Refills: 0 | Status: SHIPPED | OUTPATIENT
Start: 2022-03-02 | End: 2024-04-02

## 2022-03-02 RX ORDER — KETOROLAC TROMETHAMINE 10 MG/1
10 TABLET, FILM COATED ORAL 3 TIMES DAILY
Qty: 15 TABLET | Refills: 0 | Status: SHIPPED | OUTPATIENT
Start: 2022-03-02 | End: 2022-03-07

## 2022-03-02 NOTE — PROGRESS NOTES
GENERAL SURGERY  POST-OP PROGRESS NOTE    HPI: Courtney Reilly is a 62 y.o. female status post robotic repair left flank incisional hernia here follow-up.  Patient reports pain and firmness along the left lower abdomen between the area of her hernia and her port sites.  Denies fevers, chills, nausea, vomiting.  Pain is new and was not present prior to surgery.  She has tried warm compresses without any significant relief.  Pain can radiate down into the right groin.  She is scheduled to follow-up with her pain management doctor today.    VITALS:  Vitals:    03/02/22 1108   BP: (!) 139/92   Pulse: 97   Temp: 97.2 °F (36.2 °C)       PHYSICAL EXAM:  Physical Exam  Abdominal:          Comments: Abdominal port sites well healed without signs of infection or breakdown.  Left flank hernia site with likely small seroma but otherwise unremarkable.           ASSESSMENT & PLAN:  62 y.o. female s/p robotic repair of left flank incisional hernias now with lower left abdominal pain  - etiology uncertain  - no evidence of hernia recurrence or infection  - possible hematoma?  - will obtain CT scan to further evaluate  - keep appointment with pain management  - will give 5 day course of Toradol (patient to hold Celebrex) and 10 day course of Bactrim  - will contact patient after CT scan results

## 2022-03-04 ENCOUNTER — TELEPHONE (OUTPATIENT)
Dept: SURGERY | Facility: CLINIC | Age: 62
End: 2022-03-04
Payer: MEDICARE

## 2022-03-04 ENCOUNTER — NURSE TRIAGE (OUTPATIENT)
Dept: ADMINISTRATIVE | Facility: CLINIC | Age: 62
End: 2022-03-04
Payer: MEDICARE

## 2022-03-04 ENCOUNTER — HOSPITAL ENCOUNTER (EMERGENCY)
Facility: HOSPITAL | Age: 62
Discharge: HOME | End: 2022-03-04
Attending: EMERGENCY MEDICINE
Payer: MEDICARE

## 2022-03-04 VITALS
RESPIRATION RATE: 18 BRPM | SYSTOLIC BLOOD PRESSURE: 154 MMHG | HEART RATE: 75 BPM | OXYGEN SATURATION: 98 % | HEIGHT: 66 IN | WEIGHT: 125 LBS | DIASTOLIC BLOOD PRESSURE: 78 MMHG | BODY MASS INDEX: 20.09 KG/M2 | TEMPERATURE: 98 F

## 2022-03-04 DIAGNOSIS — G89.18 POST-OP PAIN: ICD-10-CM

## 2022-03-04 DIAGNOSIS — K59.00 CONSTIPATION, UNSPECIFIED CONSTIPATION TYPE: Primary | ICD-10-CM

## 2022-03-04 LAB
ANION GAP SERPL CALC-SCNC: 11 MMOL/L (ref 8–16)
BACTERIA #/AREA URNS HPF: ABNORMAL /HPF
BASOPHILS # BLD AUTO: 0.06 K/UL (ref 0–0.2)
BASOPHILS NFR BLD: 0.7 % (ref 0–1.9)
BILIRUB UR QL STRIP: NEGATIVE
BUN SERPL-MCNC: 19 MG/DL (ref 6–20)
CALCIUM SERPL-MCNC: 9.5 MG/DL (ref 8.7–10.5)
CHLORIDE SERPL-SCNC: 106 MMOL/L (ref 95–110)
CLARITY UR: CLEAR
CO2 SERPL-SCNC: 22 MMOL/L (ref 23–29)
COLOR UR: YELLOW
CREAT SERPL-MCNC: 0.8 MG/DL (ref 0.5–1.4)
DIFFERENTIAL METHOD: ABNORMAL
EOSINOPHIL # BLD AUTO: 0.4 K/UL (ref 0–0.5)
EOSINOPHIL NFR BLD: 4.9 % (ref 0–8)
ERYTHROCYTE [DISTWIDTH] IN BLOOD BY AUTOMATED COUNT: 14.3 % (ref 11.5–14.5)
EST. GFR  (AFRICAN AMERICAN): >60 ML/MIN/1.73 M^2
EST. GFR  (NON AFRICAN AMERICAN): >60 ML/MIN/1.73 M^2
GLUCOSE SERPL-MCNC: 99 MG/DL (ref 70–110)
GLUCOSE UR QL STRIP: NEGATIVE
HCT VFR BLD AUTO: 36 % (ref 37–48.5)
HGB BLD-MCNC: 11.8 G/DL (ref 12–16)
HGB UR QL STRIP: ABNORMAL
IMM GRANULOCYTES # BLD AUTO: 0.02 K/UL (ref 0–0.04)
IMM GRANULOCYTES NFR BLD AUTO: 0.2 % (ref 0–0.5)
KETONES UR QL STRIP: NEGATIVE
LEUKOCYTE ESTERASE UR QL STRIP: NEGATIVE
LYMPHOCYTES # BLD AUTO: 2.5 K/UL (ref 1–4.8)
LYMPHOCYTES NFR BLD: 31.5 % (ref 18–48)
MCH RBC QN AUTO: 32.2 PG (ref 27–31)
MCHC RBC AUTO-ENTMCNC: 32.8 G/DL (ref 32–36)
MCV RBC AUTO: 98 FL (ref 82–98)
MICROSCOPIC COMMENT: ABNORMAL
MONOCYTES # BLD AUTO: 0.5 K/UL (ref 0.3–1)
MONOCYTES NFR BLD: 5.6 % (ref 4–15)
NEUTROPHILS # BLD AUTO: 4.6 K/UL (ref 1.8–7.7)
NEUTROPHILS NFR BLD: 57.1 % (ref 38–73)
NITRITE UR QL STRIP: NEGATIVE
NRBC BLD-RTO: 0 /100 WBC
PH UR STRIP: 7 [PH] (ref 5–8)
PLATELET # BLD AUTO: 431 K/UL (ref 150–450)
PMV BLD AUTO: 8.8 FL (ref 9.2–12.9)
POTASSIUM SERPL-SCNC: 4 MMOL/L (ref 3.5–5.1)
PROT UR QL STRIP: NEGATIVE
RBC # BLD AUTO: 3.66 M/UL (ref 4–5.4)
RBC #/AREA URNS HPF: 10 /HPF (ref 0–4)
SODIUM SERPL-SCNC: 139 MMOL/L (ref 136–145)
SP GR UR STRIP: 1.01 (ref 1–1.03)
URN SPEC COLLECT METH UR: ABNORMAL
UROBILINOGEN UR STRIP-ACNC: NEGATIVE EU/DL
WBC # BLD AUTO: 8.04 K/UL (ref 3.9–12.7)

## 2022-03-04 PROCEDURE — 80048 BASIC METABOLIC PNL TOTAL CA: CPT | Performed by: EMERGENCY MEDICINE

## 2022-03-04 PROCEDURE — 86803 HEPATITIS C AB TEST: CPT | Performed by: EMERGENCY MEDICINE

## 2022-03-04 PROCEDURE — 96361 HYDRATE IV INFUSION ADD-ON: CPT

## 2022-03-04 PROCEDURE — 96374 THER/PROPH/DIAG INJ IV PUSH: CPT | Mod: 59

## 2022-03-04 PROCEDURE — 63600175 PHARM REV CODE 636 W HCPCS: Performed by: EMERGENCY MEDICINE

## 2022-03-04 PROCEDURE — 81000 URINALYSIS NONAUTO W/SCOPE: CPT | Performed by: EMERGENCY MEDICINE

## 2022-03-04 PROCEDURE — 25000003 PHARM REV CODE 250: Performed by: EMERGENCY MEDICINE

## 2022-03-04 PROCEDURE — 87389 HIV-1 AG W/HIV-1&-2 AB AG IA: CPT | Performed by: EMERGENCY MEDICINE

## 2022-03-04 PROCEDURE — 25500020 PHARM REV CODE 255: Performed by: EMERGENCY MEDICINE

## 2022-03-04 PROCEDURE — 99285 EMERGENCY DEPT VISIT HI MDM: CPT | Mod: 25

## 2022-03-04 PROCEDURE — 36415 COLL VENOUS BLD VENIPUNCTURE: CPT | Performed by: EMERGENCY MEDICINE

## 2022-03-04 PROCEDURE — 85025 COMPLETE CBC W/AUTO DIFF WBC: CPT | Performed by: EMERGENCY MEDICINE

## 2022-03-04 RX ORDER — SODIUM CHLORIDE 9 MG/ML
1000 INJECTION, SOLUTION INTRAVENOUS
Status: COMPLETED | OUTPATIENT
Start: 2022-03-04 | End: 2022-03-04

## 2022-03-04 RX ORDER — KETOROLAC TROMETHAMINE 30 MG/ML
15 INJECTION, SOLUTION INTRAMUSCULAR; INTRAVENOUS
Status: COMPLETED | OUTPATIENT
Start: 2022-03-04 | End: 2022-03-04

## 2022-03-04 RX ORDER — DOCUSATE SODIUM 100 MG/1
100 CAPSULE, LIQUID FILLED ORAL 2 TIMES DAILY
Qty: 20 CAPSULE | Refills: 0 | Status: SHIPPED | OUTPATIENT
Start: 2022-03-04 | End: 2022-03-14

## 2022-03-04 RX ADMIN — KETOROLAC TROMETHAMINE 15 MG: 30 INJECTION, SOLUTION INTRAMUSCULAR at 05:03

## 2022-03-04 RX ADMIN — SODIUM CHLORIDE 1000 ML: 0.9 INJECTION, SOLUTION INTRAVENOUS at 05:03

## 2022-03-04 RX ADMIN — IOHEXOL 75 ML: 350 INJECTION, SOLUTION INTRAVENOUS at 05:03

## 2022-03-04 NOTE — TELEPHONE ENCOUNTER
Post op scheduled for CT on Monday. Reports swelling has become worse with pain also radiating to her back. She also reports an episode where she lost control of her bladder. Advised, per protocol to be evaluated in the ED. Verbalizes understanding.      Reason for Disposition   Patient sounds very sick or weak to the triager    Additional Information   Negative: Sounds like a life-threatening emergency to the triager   Negative: Bright red, wide-spread, sunburn-like rash    Protocols used: POST-OP SYMPTOMS AND IILPNVASH-O-HW

## 2022-03-04 NOTE — TELEPHONE ENCOUNTER
----- Message from Vijaya Nava sent at 3/4/2022  2:51 PM CST -----  Type: Requesting to speak with nurse         Who Called: PT  Regarding: Pain is getting worse and would like to know what to do please advise   Would the patient rather a call back or a response via [facility]? Call back  Best Call Back Number: [phone]  Additional Information: n/a

## 2022-03-04 NOTE — ED PROVIDER NOTES
Source of History:  The patient    Chief complaint:  Abdominal Pain (Pt had double hernia SX two weeks ago.), Back Pain, and incontinent episode      HPI:  Courtney eRilly is a 62 y.o. female presenting with a gradual onset of left-sided lower pelvic and abdominal pain that is radiating back to the left flank that started about 2 weeks ago and is constantly gotten worse.  She also complains of abdominal distension.  This all occurred after she had a double hernia repair in the left flank and left intercostal region.  She denies any fevers or nausea vomiting.  States the pain is sharp worse with movement.    She also stated earlier today she had gone to the restroom with no issues.  About 10 minutes after she had urinated with complete control she stated she urinated on herself.  This is never happened before.  Denies any numbness in the perineum area.    This is the extent to the patients complaints today here in the emergency department.    ROS: As per HPI and below:  Constitutional: No fever.  No chills.  Eyes: No visual changes.  ENT: No sore throat. No ear pain    Cardiovascular: No chest pain.  Respiratory: No shortness of breath.  GI:  Positive for abdominal pain and flank pain  Genitourinary:  A single episode of urinating on self  Neurologic: No headache. No focal weakness.  No numbness.  MSK: no back pain.  Integument: No rashes or lesions.  Hematologic: No easy bruising.  Endocrine: No excessive thirst or urination.    Review of patient's allergies indicates:   Allergen Reactions    Bee pollens     Bee sting kit Hives    Mushroom flavor        PMH:  As per HPI and below:  Past Medical History:   Diagnosis Date    Arthritis     Chronic back pain     Encounter for blood transfusion     Fatigue     Fibromyalgia     GERD (gastroesophageal reflux disease)     Herniated disc     Multiple sclerosis     Ovarian tumor     Radiculopathy     Restless legs syndrome (RLS)     Spasticity     Stroke      "twice    TIA (transient ischemic attack)      Past Surgical History:   Procedure Laterality Date    CEREBRAL ANEURYSM REPAIR      HYSTERECTOMY      age 27    KNEE SURGERY  2008    left    pain pump implant      [place] REMOVAL OF OVARY/TUBE(S)      ROBOT-ASSISTED LAPAROSCOPIC REPAIR OF VENTRAL HERNIA Left 2/15/2022    Procedure: ROBOTIC REPAIR, HERNIA, INCISIONAL - LEFT FLANK;  Surgeon: Sixto Clark Jr., MD;  Location: [facility];  Service: General;  Laterality: Left;    SPINE SURGERY  2006      2 on neck.  2 on lumbar spine    TONSILLECTOMY      WRIST SURGERY  2007    right       Social History     Tobacco Use    Smoking status: Current Every Day Smoker     Packs/day: 0.50     Years: 30.00     Pack years: 15.00     Types: Cigarettes    Smokeless tobacco: Never Used    Tobacco comment: .    Occup:   disabled.   , lives alone.     Substance Use Topics    Alcohol use: Yes     Comment: socially    Drug use: No       Physical Exam:    BP (!) 166/77   Pulse 77   Temp 98 °F (36.7 °C) (Oral)   Resp 18   Ht 5' 6" (1.676 m)   Wt 56.7 kg (125 lb)   SpO2 100%   BMI 20.18 kg/m²   Nursing note and vital signs reviewed.  Constitutional: No acute distress.  Nontoxic  Eyes: No conjunctival injection.  Extraocular muscles are intact.  ENT: Oropharynx clear.  Normal phonation.  Cardiovascular: Regular rate and rhythm.  No murmurs. No gallops. No rubs  Respiratory: Clear to auscultation bilaterally.  Good air movement.  No wheezes.  No rhonchi. No rales. No accessory muscle use.  Abdomen:  Tenderness to palpation the left pelvic region with also crepitus let appears to be possibly consistent with an inguinal hernia.  Also tenderness to palpation left abdomen, left flank and left paraspinal muscles on the lumbar region.  Patient has surgical scars from laparoscopic procedure clean dry and intact.  She has an old scar in the left lower quadrant of her abdomen which she states was a pain pump.  Also has " a old surgical scar midline over her lumbar region.  Musculoskeletal: Good range of motion all joints.  No deformities.  Neck supple.  No meningismus.  Skin: No rashes seen.  Good turgor.  No abrasions.  No ecchymoses.  Neuro: alert and oriented x3,  no focal neurological deficits.  Good sensation in the perineum and normal rectal tone.  Psych: Appropriate, conversant    Labs that have been ordered have been independently reviewed and interpreted by myself.    I decided to obtain the patient's medical records.  Summary of Medical Records:  02/15/2022 surgical repair of left flank incisional hernia and left intercostal incisional hernia. Dr Sixto Clark    MDM/ Differential Dx:   62 y.o. female with left abdominal and flank and pelvic pain that is worsened over last 2 weeks with abdominal distension.  Minimal bowel movement however she states she has IBS and can go weeks without p.m..  Differential would include small-bowel obstruction, hernia, constipation.  Do not suspect diverticulitis or appendicitis.  Also possible renal colic or pyelonephritis.  She had a single episode of urinary on herself immediately after she had complete control.  She has no numbness in the perineum a very low suspicion of cauda equina syndrome.    ED Course as of 03/04/22 1846   Fri Mar 04, 2022   1743 Creatinine: 0.8 [SM]   1743 Sodium: 139 [SM]   1743 Potassium: 4.0 [SM]   1743 The patient was able to give her own urinalysis with complete control. [SM]   1801 Occult Blood UA(!): 2+ [SM]   1841 CT Abdomen Pelvis With Contrast  CT abdomen pelvis shows no acute abnormalities.  There is a significant amount of stool burden and gas with no signs of obstruction.  This is most likely the cause the patient's distension and complaints. [SM]   1842 Discussed results with the patient.  I did offer an enema to the patient however she states she would prefer to do 1 home.  I feel this is reasonable. Discussed with Dr Valdez, on call for   Eduardo who agrees with plan and will discharge.    No further workup indicated based on their complaints or examination today.    Patient to be discharged home in stable condition. Diagnosis and treatment plan explained to patient and/or family member who is at bedside. I have answered all questions and the patient is satisfied with the plan of care. Strict return precautions given. The patient demonstrates understanding of the care plan. [SM]      ED Course User Index  [SM] Micah Fuller DO               Diagnostic Impression:    1. Constipation, unspecified constipation type    2. Post-op pain         ED Disposition Condition    Discharge Stable          ED Prescriptions     Medication Sig Dispense Start Date End Date Auth. Provider    docusate sodium (COLACE) 100 MG capsule Take 1 capsule (100 mg total) by mouth 2 (two) times daily. for 10 days 20 capsule 3/4/2022 3/14/2022 Micah Fuller DO        Follow-up Information     Follow up With Specialties Details Why Contact Info    Sixto Clark Jr., MD General Surgery Call   [address] [facility]  [address] [place] [id number]  [phone]             Micah Fuller DO  03/04/22 5156

## 2022-03-04 NOTE — TELEPHONE ENCOUNTER
Call returned x 3 no answer urgent voice message left with instruction to go to ER .with  Call back numbers

## 2022-03-04 NOTE — ED NOTES
Pt presents to the ED with complaint of left lower abd, pelvic and flank pain. Pt had surgery 2 weeks ago for hernia in the back and left rib with no relief in pain.  Pt stated that she had one episode of urinary incontinence.  Pain is a 9/10. No N/V/D.  No other complaints at this time.

## 2022-03-05 NOTE — DISCHARGE INSTRUCTIONS
Use over the counter enema in order to start having bowel movements.     I also recommend Metamucil or MiraLax over-the-counter.

## 2022-03-09 LAB
HCV AB SERPL QL IA: NEGATIVE
HIV 1+2 AB+HIV1 P24 AG SERPL QL IA: NEGATIVE

## 2022-04-18 ENCOUNTER — OFFICE VISIT (OUTPATIENT)
Dept: NEUROSURGERY | Facility: CLINIC | Age: 62
End: 2022-04-18
Payer: MEDICARE

## 2022-04-18 VITALS
BODY MASS INDEX: 20.05 KG/M2 | HEIGHT: 66 IN | DIASTOLIC BLOOD PRESSURE: 96 MMHG | SYSTOLIC BLOOD PRESSURE: 164 MMHG | HEART RATE: 85 BPM | WEIGHT: 124.75 LBS

## 2022-04-18 DIAGNOSIS — D32.9 MENINGIOMA: Primary | ICD-10-CM

## 2022-04-18 PROCEDURE — 4010F PR ACE/ARB THEARPY RXD/TAKEN: ICD-10-PCS | Mod: CPTII,S$GLB,, | Performed by: NEUROLOGICAL SURGERY

## 2022-04-18 PROCEDURE — 3077F SYST BP >= 140 MM HG: CPT | Mod: CPTII,S$GLB,, | Performed by: NEUROLOGICAL SURGERY

## 2022-04-18 PROCEDURE — 3008F BODY MASS INDEX DOCD: CPT | Mod: CPTII,S$GLB,, | Performed by: NEUROLOGICAL SURGERY

## 2022-04-18 PROCEDURE — 99204 OFFICE O/P NEW MOD 45 MIN: CPT | Mod: S$GLB,,, | Performed by: NEUROLOGICAL SURGERY

## 2022-04-18 PROCEDURE — 1159F PR MEDICATION LIST DOCUMENTED IN MEDICAL RECORD: ICD-10-PCS | Mod: CPTII,S$GLB,, | Performed by: NEUROLOGICAL SURGERY

## 2022-04-18 PROCEDURE — 3008F PR BODY MASS INDEX (BMI) DOCUMENTED: ICD-10-PCS | Mod: CPTII,S$GLB,, | Performed by: NEUROLOGICAL SURGERY

## 2022-04-18 PROCEDURE — 99204 PR OFFICE/OUTPT VISIT, NEW, LEVL IV, 45-59 MIN: ICD-10-PCS | Mod: S$GLB,,, | Performed by: NEUROLOGICAL SURGERY

## 2022-04-18 PROCEDURE — 3077F PR MOST RECENT SYSTOLIC BLOOD PRESSURE >= 140 MM HG: ICD-10-PCS | Mod: CPTII,S$GLB,, | Performed by: NEUROLOGICAL SURGERY

## 2022-04-18 PROCEDURE — 3080F DIAST BP >= 90 MM HG: CPT | Mod: CPTII,S$GLB,, | Performed by: NEUROLOGICAL SURGERY

## 2022-04-18 PROCEDURE — 4010F ACE/ARB THERAPY RXD/TAKEN: CPT | Mod: CPTII,S$GLB,, | Performed by: NEUROLOGICAL SURGERY

## 2022-04-18 PROCEDURE — 1159F MED LIST DOCD IN RCRD: CPT | Mod: CPTII,S$GLB,, | Performed by: NEUROLOGICAL SURGERY

## 2022-04-18 PROCEDURE — 3080F PR MOST RECENT DIASTOLIC BLOOD PRESSURE >= 90 MM HG: ICD-10-PCS | Mod: CPTII,S$GLB,, | Performed by: NEUROLOGICAL SURGERY

## 2022-04-18 RX ORDER — ALBUTEROL SULFATE 90 UG/1
AEROSOL, METERED RESPIRATORY (INHALATION)
COMMUNITY
Start: 2021-08-31 | End: 2024-04-02 | Stop reason: ALTCHOICE

## 2022-04-18 RX ORDER — SUMATRIPTAN SUCCINATE 100 MG/1
TABLET ORAL
COMMUNITY
Start: 2021-09-13

## 2022-04-18 NOTE — PROGRESS NOTES
HPI:  This is a very pleasant 62-year-old woman with a history of migraine headaches, TIA, cerebral aneurysm repair, previous cervical and lumbar surgery, who presents with a outside MRI brain with and without contrast for review.  This study was ordered relative to intermittent right temporal headaches.  She endorses intermittent headaches in the right temporal frontal region which occur primarily at night and particularly when lying on her right side.  The headaches are typically relieved with Imitrex within 10 minutes.  She has no associated photophobia phonophobia nausea vomiting.  She reports history of poor vision due to cataracts.  She notes that her ophthalmologist recently recommended bifocals.  She notes that she may need cataract surgery in the future as well.  She underwent extensive cervical fusion in November 2021. She denies speech difficulty, extremity weakness, ataxia, paresthesias, seizures.      Past Medical History:   Diagnosis Date    Arthritis     Chronic back pain     Encounter for blood transfusion     Fatigue     Fibromyalgia     GERD (gastroesophageal reflux disease)     Herniated disc     Multiple sclerosis     Ovarian tumor     Radiculopathy     Restless legs syndrome (RLS)     Spasticity     Stroke     twice    TIA (transient ischemic attack)       Past Surgical History:   Procedure Laterality Date    CEREBRAL ANEURYSM REPAIR      HYSTERECTOMY      age 27    KNEE SURGERY  2008    left    pain pump implant      [place] REMOVAL OF OVARY/TUBE(S)      ROBOT-ASSISTED LAPAROSCOPIC REPAIR OF VENTRAL HERNIA Left 2/15/2022    Procedure: ROBOTIC REPAIR, HERNIA, INCISIONAL - LEFT FLANK;  Surgeon: Sixto Clark Jr., MD;  Location: [facility];  Service: General;  Laterality: Left;    SPINE SURGERY  2006      2 on neck.  2 on lumbar spine    TONSILLECTOMY      WRIST SURGERY  2007    right     Social History     Tobacco Use    Smoking status: Current Every Day Smoker      Packs/day: 0.50     Years: 30.00     Pack years: 15.00     Types: Cigarettes    Smokeless tobacco: Never Used    Tobacco comment: .    Occup:   disabled.   , lives alone.     Substance Use Topics    Alcohol use: Yes     Comment: socially    Drug use: No       Review of Systems: All systems reviewed and are as above or otherwise negative.    General: well developed, well nourished, no distress.   Head: normocephalic, atraumatic  Eyes: pupils equal, round, reactive to light with accomodation, EOMI.   Neck: trachea midline. No JVD  Cardiovascular: No LE edema   Pulmonary: normal respirations, no signs of respiratory distress  Abdomen: soft, non-distended, not tender to palpation  Sensory: intact to light touch throughout  Extremities: No bruising, deformity  Skin: Skin is warm, dry and intact.    Motor Strength: Moves all extremities spontaneously with good tone.  Full strength upper and lower extremities. No abnormal movements seen.     Strength  Deltoids Triceps Biceps Wrist Extension Wrist Flexion Hand    Upper: R 5/5 5/5 5/5 5/5 5/5 5/5    L 5/5 5/5 5/5 5/5 5/5 5/5     Iliopsoas Quadriceps Knee  Flexion Tibialis  anterior Gastro- cnemius EHL   Lower: R 5/5 5/5 5/5 5/5 5/5 5/5    L 5/5 5/5 5/5 5/5 5/5 5/5     Neurologic: Alert and oriented. Thought content appropriate.  GCS: Motor: 6/Verbal: 5/Eyes: 4 GCS Total: 15  Mental Status: Awake, Alert, Oriented x 4  Language: No aphasia  Speech: No dysarthria  Cranial nerves: face symmetric, tongue midline, CN II-XII grossly intact.     Pronator Drift: no drift noted  Finger-to-nose: Intact bilaterally  DTR's: 2 + and symmetric in UE and LE  Ellis: absent  Clonus: absent  Babinski: absent    Gait: normal    Tandem Gait: No difficulty           Able to walk on heels & toes    Cervical Spine  ROM: full    Nontender to palpation    Lumbar Spine   ROM: full   Nontender to palpation    Pain on Hip ROM: Negative  Straight leg raise: negative bilaterally      SI Joint tenderness: Negative bilaterally   MARICARMEN: Negative bilaterally  Thigh Thrust: Negative bilaterally  Gaenslen: Negative bilaterally    Significant Exam Findings:  Benign neurologic exam  Significant Radiology Findings:  I reviewed outside MRI imaging brain obtained with and without gadolinium on February 10, 2022. There is a 1.2 x 0.6 cm extra-axial Andrea generously enhancing lesion in left frontal convexity without associated mass effect or edema.    Analysis:  Her findings most consistent with a small left frontal meningioma in the setting of chronic migraine headaches.  The meningioma is not likely a causative factor for her headaches as described above.  There is no role for surgical intervention at this time.  I recommended observation with serial imaging.  I ordered a repeat MRI brain with and without contrast in 6 months.  We will see her back at that time with the above imaging for review.  [unfilled]   Courtney was seen today for headache.    Diagnoses and all orders for this visit:    Meningioma  -     MRI Brain W WO Contrast; Future  -     Creatinine, serum; Future         Follow up in about 6 months (around 10/18/2022).

## 2024-04-02 ENCOUNTER — OFFICE VISIT (OUTPATIENT)
Dept: ORTHOPEDICS | Facility: CLINIC | Age: 64
End: 2024-04-02
Payer: MEDICARE

## 2024-04-02 ENCOUNTER — OFFICE VISIT (OUTPATIENT)
Dept: PULMONOLOGY | Facility: CLINIC | Age: 64
End: 2024-04-02
Payer: MEDICARE

## 2024-04-02 VITALS
HEART RATE: 87 BPM | DIASTOLIC BLOOD PRESSURE: 84 MMHG | OXYGEN SATURATION: 95 % | HEIGHT: 66 IN | WEIGHT: 144.81 LBS | SYSTOLIC BLOOD PRESSURE: 128 MMHG | BODY MASS INDEX: 23.27 KG/M2

## 2024-04-02 VITALS — HEIGHT: 66 IN | WEIGHT: 124.75 LBS | BODY MASS INDEX: 20.05 KG/M2

## 2024-04-02 DIAGNOSIS — F17.210 NICOTINE DEPENDENCE, CIGARETTES, UNCOMPLICATED: ICD-10-CM

## 2024-04-02 DIAGNOSIS — J44.9 CHRONIC OBSTRUCTIVE PULMONARY DISEASE, UNSPECIFIED COPD TYPE: Primary | ICD-10-CM

## 2024-04-02 DIAGNOSIS — Z80.1 FAMILY HISTORY OF LUNG CANCER: ICD-10-CM

## 2024-04-02 DIAGNOSIS — M17.12 PRIMARY OSTEOARTHRITIS OF LEFT KNEE: Primary | ICD-10-CM

## 2024-04-02 PROCEDURE — 4010F ACE/ARB THERAPY RXD/TAKEN: CPT | Mod: CPTII,S$GLB,, | Performed by: INTERNAL MEDICINE

## 2024-04-02 PROCEDURE — 99203 OFFICE O/P NEW LOW 30 MIN: CPT | Mod: S$GLB,,, | Performed by: INTERNAL MEDICINE

## 2024-04-02 PROCEDURE — 3008F BODY MASS INDEX DOCD: CPT | Mod: CPTII,S$GLB,, | Performed by: ORTHOPAEDIC SURGERY

## 2024-04-02 PROCEDURE — 3079F DIAST BP 80-89 MM HG: CPT | Mod: CPTII,S$GLB,, | Performed by: INTERNAL MEDICINE

## 2024-04-02 PROCEDURE — 3008F BODY MASS INDEX DOCD: CPT | Mod: CPTII,S$GLB,, | Performed by: INTERNAL MEDICINE

## 2024-04-02 PROCEDURE — 99999 PR PBB SHADOW E&M-EST. PATIENT-LVL IV: CPT | Mod: PBBFAC,,, | Performed by: INTERNAL MEDICINE

## 2024-04-02 PROCEDURE — 3074F SYST BP LT 130 MM HG: CPT | Mod: CPTII,S$GLB,, | Performed by: INTERNAL MEDICINE

## 2024-04-02 PROCEDURE — 99203 OFFICE O/P NEW LOW 30 MIN: CPT | Mod: 25,S$GLB,, | Performed by: ORTHOPAEDIC SURGERY

## 2024-04-02 PROCEDURE — 20610 DRAIN/INJ JOINT/BURSA W/O US: CPT | Mod: LT,S$GLB,, | Performed by: ORTHOPAEDIC SURGERY

## 2024-04-02 PROCEDURE — 1159F MED LIST DOCD IN RCRD: CPT | Mod: CPTII,S$GLB,, | Performed by: INTERNAL MEDICINE

## 2024-04-02 PROCEDURE — 1159F MED LIST DOCD IN RCRD: CPT | Mod: CPTII,S$GLB,, | Performed by: ORTHOPAEDIC SURGERY

## 2024-04-02 PROCEDURE — 4010F ACE/ARB THERAPY RXD/TAKEN: CPT | Mod: CPTII,S$GLB,, | Performed by: ORTHOPAEDIC SURGERY

## 2024-04-02 RX ORDER — PREDNISONE 20 MG/1
TABLET ORAL
Qty: 12 TABLET | Refills: 0 | Status: SHIPPED | OUTPATIENT
Start: 2024-04-02 | End: 2024-06-11 | Stop reason: SDUPTHER

## 2024-04-02 RX ORDER — TRIAMCINOLONE ACETONIDE 40 MG/ML
40 INJECTION, SUSPENSION INTRA-ARTICULAR; INTRAMUSCULAR
Status: DISCONTINUED | OUTPATIENT
Start: 2024-04-02 | End: 2024-04-02 | Stop reason: HOSPADM

## 2024-04-02 RX ORDER — HYDROCODONE BITARTRATE AND ACETAMINOPHEN 10; 325 MG/1; MG/1
1 TABLET ORAL
COMMUNITY
Start: 2024-03-22

## 2024-04-02 RX ORDER — FLUTICASONE FUROATE, UMECLIDINIUM BROMIDE AND VILANTEROL TRIFENATATE 200; 62.5; 25 UG/1; UG/1; UG/1
1 POWDER RESPIRATORY (INHALATION) DAILY
Qty: 180 EACH | Refills: 3 | Status: SHIPPED | OUTPATIENT
Start: 2024-04-02 | End: 2024-06-11

## 2024-04-02 RX ORDER — ALBUTEROL SULFATE 90 UG/1
2 AEROSOL, METERED RESPIRATORY (INHALATION) EVERY 4 HOURS PRN
Qty: 18 G | Refills: 11 | Status: SHIPPED | OUTPATIENT
Start: 2024-04-02 | End: 2024-04-02 | Stop reason: SDUPTHER

## 2024-04-02 RX ORDER — QUETIAPINE FUMARATE 25 MG/1
TABLET, FILM COATED ORAL
COMMUNITY
Start: 2023-11-27

## 2024-04-02 RX ORDER — IRBESARTAN 150 MG/1
150 TABLET ORAL NIGHTLY
COMMUNITY

## 2024-04-02 RX ORDER — LINACLOTIDE 145 UG/1
145 CAPSULE, GELATIN COATED ORAL
COMMUNITY
End: 2024-06-11

## 2024-04-02 RX ORDER — AZITHROMYCIN 500 MG/1
TABLET, FILM COATED ORAL
Qty: 3 TABLET | Refills: 3 | Status: SHIPPED | OUTPATIENT
Start: 2024-04-02 | End: 2024-04-02 | Stop reason: SDUPTHER

## 2024-04-02 RX ORDER — MELATONIN 5 MG
1 CAPSULE ORAL NIGHTLY
COMMUNITY

## 2024-04-02 RX ORDER — PREDNISONE 20 MG/1
TABLET ORAL
Qty: 12 TABLET | Refills: 0 | Status: SHIPPED | OUTPATIENT
Start: 2024-04-02 | End: 2024-04-02 | Stop reason: SDUPTHER

## 2024-04-02 RX ORDER — AZITHROMYCIN 500 MG/1
TABLET, FILM COATED ORAL
Qty: 3 TABLET | Refills: 3 | Status: SHIPPED | OUTPATIENT
Start: 2024-04-02

## 2024-04-02 RX ORDER — ALBUTEROL SULFATE 90 UG/1
2 AEROSOL, METERED RESPIRATORY (INHALATION) EVERY 4 HOURS PRN
Qty: 54 G | Refills: 3 | Status: SHIPPED | OUTPATIENT
Start: 2024-04-02

## 2024-04-02 RX ORDER — TIZANIDINE 4 MG/1
4 TABLET ORAL 3 TIMES DAILY
COMMUNITY

## 2024-04-02 RX ORDER — GABAPENTIN 600 MG/1
TABLET ORAL
COMMUNITY
Start: 2024-03-22 | End: 2024-06-11

## 2024-04-02 RX ORDER — FLUTICASONE FUROATE, UMECLIDINIUM BROMIDE AND VILANTEROL TRIFENATATE 200; 62.5; 25 UG/1; UG/1; UG/1
1 POWDER RESPIRATORY (INHALATION) DAILY
Qty: 60 EACH | Refills: 11 | Status: SHIPPED | OUTPATIENT
Start: 2024-04-02 | End: 2024-04-02 | Stop reason: SDUPTHER

## 2024-04-02 RX ADMIN — TRIAMCINOLONE ACETONIDE 40 MG: 40 INJECTION, SUSPENSION INTRA-ARTICULAR; INTRAMUSCULAR at 11:04

## 2024-04-02 NOTE — PROGRESS NOTES
2024    Courtney Reilly  New Patient Consult    Chief Complaint   Patient presents with    Emphysema       HPI:  2024 pt smoker with chr back pain, son with substance use  recently--     Pt has excess hardware in back from back dz.  Mom and dad both had lung cancer along with uncles and granddad.       Pt had had pneumonia 2/yr, got pneumonia vaccine.    Used bronchitis rx with once yearly or so-- not worse at night..    Still smokes..     PFSH:  Past Medical History:   Diagnosis Date    Arthritis     Chronic back pain     Encounter for blood transfusion     Fatigue     Fibromyalgia     GERD (gastroesophageal reflux disease)     Herniated disc     Multiple sclerosis     Ovarian tumor     Radiculopathy     Restless legs syndrome (RLS)     Spasticity     Stroke     twice    TIA (transient ischemic attack)          Past Surgical History:   Procedure Laterality Date    CEREBRAL ANEURYSM REPAIR      HYSTERECTOMY      age 27    KNEE SURGERY      left    pain pump implant      [place] REMOVAL OF OVARY/TUBE(S)      ROBOT-ASSISTED LAPAROSCOPIC REPAIR OF VENTRAL HERNIA Left 2/15/2022    Procedure: ROBOTIC REPAIR, HERNIA, INCISIONAL - LEFT FLANK;  Surgeon: Sixto Clark Jr., MD;  Location: [facility];  Service: General;  Laterality: Left;    SPINE SURGERY  2006      2 on neck.  2 on lumbar spine    TONSILLECTOMY      WRIST SURGERY  2007    right     Social History     Tobacco Use    Smoking status: Every Day     Current packs/day: 0.50     Average packs/day: 0.5 packs/day for 30.0 years (15.0 ttl pk-yrs)     Types: Cigarettes    Smokeless tobacco: Never    Tobacco comments:     .    Occup:   disabled.   , lives alone.     Substance Use Topics    Alcohol use: Yes     Comment: socially    Drug use: No     Family History   Problem Relation Age of Onset    Cancer Mother         Lung cancer    Cancer Father         Lung cancer     Review of patient's allergies indicates:   Allergen Reactions    Bee pollens      "Bee sting kit Hives    Mushroom flavor Hives          Review of Systems:  a review of eleven systems covering constitutional, Eye, HEENT, Psych, Respiratory, Cardiac, GI, , Musculoskeletal, Endocrine, Dermatologic was negative except for pertinent findings as listed ABOVE and belo     pertinent positives as above, rest good        Exam:Comprehensive exam done. /84 (BP Location: Right arm, Patient Position: Sitting, BP Method: Small (Automatic))   Pulse 87   Ht 5' 6" (1.676 m)   Wt 65.7 kg (144 lb 13.5 oz)   SpO2 95% Comment: on room air at rest  BMI 23.38 kg/m²   Exam included Vitals as listed, and patient's appearance and affect and alertness and mood, oral exam for yeast and hygiene and pharynx lesions and Mallapatti (M) score, neck with inspection for jvd and masses and thyroid abnormalities and lymph nodes (supraclavicular and infraclavicular nodes and axillary also examined and noted if abn), chest exam included symmetry and effort and fremitus and percussion and auscultation, cardiac exam included rhythm and gallops and murmur and rubs and jvd and edema, abdominal exam for mass and hepatosplenomegaly and tenderness and hernias and bowel sounds, Musculoskeletal exam with muscle tone and posture and mobility/gait and  strength, and skin for rashes and cyanosis and pallor and turgor, extremity for clubbing.  Findings were normal except for pertinent findings listed below:  M1,chest is symmetric, no distress, normal percussion, normal fremitus and good normal breath sounds           Radiographs (ct chest and cxr) reviewed: view by direct vision      Labs reviewed           PFT will be done and results to be reviewed       Plan:  Clinical impression is apparently straight forward and impression with management as below.     Courtney was seen today for emphysema.    Diagnoses and all orders for this visit:    Chronic obstructive pulmonary disease, unspecified COPD type  -     Discontinue: " fluticasone-umeclidin-vilanter (TRELEGY ELLIPTA) 200-62.5-25 mcg inhaler; Inhale 1 puff into the lungs once daily.  -     Discontinue: predniSONE (DELTASONE) 20 MG tablet; Take one daily for 3 days and may repeat for shortness of breath.  -     Discontinue: albuterol (PROVENTIL/VENTOLIN HFA) 90 mcg/actuation inhaler; Inhale 2 puffs into the lungs every 4 (four) hours as needed for Wheezing or Shortness of Breath. 2 puffs every 4 hours as needed for cough, wheeze, or shortness of breath  -     Discontinue: azithromycin (ZITHROMAX) 500 MG tablet; One daily for yellow mucous, repeat if needed  -     albuterol (PROVENTIL/VENTOLIN HFA) 90 mcg/actuation inhaler; Inhale 2 puffs into the lungs every 4 (four) hours as needed for Wheezing or Shortness of Breath. 2 puffs every 4 hours as needed for cough, wheeze, or shortness of breath  -     azithromycin (ZITHROMAX) 500 MG tablet; One daily for yellow mucous, repeat if needed  -     fluticasone-umeclidin-vilanter (TRELEGY ELLIPTA) 200-62.5-25 mcg inhaler; Inhale 1 puff into the lungs once daily.  -     predniSONE (DELTASONE) 20 MG tablet; Take one daily for 3 days and may repeat for shortness of breath.    Nicotine dependence, cigarettes, uncomplicated  -     CT Chest Lung Screening Low Dose; Standing    Family history of lung cancer        Follow up in about 1 year (around 4/2/2025), or if symptoms worsen or fail to improve.    Discussed with patient above for education the following:      Patient Instructions   Ct chest 2010 viewed and lungs looked good with min emphysema -- ct chest at [place] 1/5/2024 no lung lesion (cannot view films).      You have chronic bronchitis and high risk for lung cancer...    Could measure lung capacity with breathing test -- you should have some degree of copd    Trial trelegy once daily reasonable-- should decrease broncitis.    Use albuterol as needed  if more cough or short breath.  May use prednisone if albuterol not clearing  bronchitis.    Azithromycin may be used for infection if needed    You may have had immune weakness.      Ct chest 1/2024 with no worrisome findings     Ct screening would be reasonable for lung cancer as you are high risk.   Computer suggest not covered well-- may be.  Will order standing order for ct yearly for 5 yrs.  May do ct til age 82    You need to stop smokes....

## 2024-04-02 NOTE — PATIENT INSTRUCTIONS
Ct chest 2010 viewed and lungs looked good with min emphysema -- ct chest at [place] 1/5/2024 no lung lesion (cannot view films).      You have chronic bronchitis and high risk for lung cancer...    Could measure lung capacity with breathing test -- you should have some degree of copd    Trial trelegy once daily reasonable-- should decrease broncitis.    Use albuterol as needed  if more cough or short breath.  May use prednisone if albuterol not clearing bronchitis.    Azithromycin may be used for infection if needed    You may have had immune weakness.      Ct chest 1/2024 with no worrisome findings     Ct screening would be reasonable for lung cancer as you are high risk.   Computer suggest not covered well-- may be.  Will order standing order for ct yearly for 5 yrs.  May do ct til age 82    You need to stop smokes....

## 2024-04-02 NOTE — PROGRESS NOTES
[facility]    Subjective:     Chief Complaint:   Chief Complaint   Patient presents with    Left Knee - Pain     New patient c/o left knee pain x history of 2 prior scopes and course of gel injection. Constant pain grinding Bone on bone       Past Medical History:   Diagnosis Date    Arthritis     Chronic back pain     Encounter for blood transfusion     Fatigue     Fibromyalgia     GERD (gastroesophageal reflux disease)     Herniated disc     Multiple sclerosis     Ovarian tumor     Radiculopathy     Restless legs syndrome (RLS)     Spasticity     Stroke     twice    TIA (transient ischemic attack)        Past Surgical History:   Procedure Laterality Date    CEREBRAL ANEURYSM REPAIR      HYSTERECTOMY      age 27    KNEE SURGERY  2008    left    pain pump implant      [place] REMOVAL OF OVARY/TUBE(S)      ROBOT-ASSISTED LAPAROSCOPIC REPAIR OF VENTRAL HERNIA Left 2/15/2022    Procedure: ROBOTIC REPAIR, HERNIA, INCISIONAL - LEFT FLANK;  Surgeon: Sixto Clark Jr., MD;  Location: [facility];  Service: General;  Laterality: Left;    SPINE SURGERY  2006      2 on neck.  2 on lumbar spine    TONSILLECTOMY      WRIST SURGERY  2007    right       Current Outpatient Medications   Medication Sig    HYDROcodone-acetaminophen (NORCO)  mg per tablet Take 1 tablet by mouth.    QUEtiapine (SEROQUEL) 25 MG Tab take 1 - 2 tablets BY MOUTH ONCE DAILY IN THE EVENING AS NEEDED    albuterol (PROVENTIL/VENTOLIN HFA) 90 mcg/actuation inhaler INHALE TWO PUFFS INTO THE LUNGS EVERY 4 HOURS AS NEEDED FOR WHEEZING    aspirin (ECOTRIN) 81 MG EC tablet Take 1 tablet (81 mg total) by mouth once daily.    baclofen (LIORESAL) 10 MG tablet Take 1 tablet (10 mg total) by mouth 3 (three) times daily. for 10 days    celecoxib (CELEBREX) 200 MG capsule Take 1 capsule (200 mg total) by mouth once daily.    dantrolene (DANTRIUM) 50 MG Cap Take 50 mg by mouth 3 (three) times daily.    irbesartan (AVAPRO) 150 MG tablet Take 150 mg by mouth  every evening.    omeprazole (PRILOSEC) 20 MG capsule     rosuvastatin (CRESTOR) 5 MG tablet Take 5 mg by mouth once daily.    solifenacin (VESICARE) 5 MG tablet Take 10 mg by mouth once daily.    sumatriptan (IMITREX) 100 MG tablet sumatriptan 100 mg tablet   TAKE ONE TABLET BY MOUTH AS NEEDED FOR MIGRAINE, MAY REPEAT in ONE HOUR AS NEEDED FOR HEADACHE max TWO PER DAY    tiZANidine (ZANAFLEX) 4 MG tablet Take 4 mg by mouth 3 (three) times daily.    topiramate (TOPAMAX) 50 MG tablet Take 50 mg by mouth 2 (two) times daily.    vitamin E 400 UNIT capsule Take 400 Units by mouth once daily.    zolpidem (AMBIEN) 10 mg Tab Take 5 mg by mouth nightly as needed.     No current facility-administered medications for this visit.       Review of patient's allergies indicates:   Allergen Reactions    Bee pollens     Bee sting kit Hives    Mushroom flavor Hives       Family History   Problem Relation Age of Onset    Cancer Mother         Lung cancer    Cancer Father         Lung cancer       Social History     Socioeconomic History    Marital status:    Tobacco Use    Smoking status: Every Day     Current packs/day: 0.50     Average packs/day: 0.5 packs/day for 30.0 years (15.0 ttl pk-yrs)     Types: Cigarettes    Smokeless tobacco: Never    Tobacco comments:     .    Occup:   disabled.   , lives alone.     Substance and Sexual Activity    Alcohol use: Yes     Comment: socially    Drug use: No    Sexual activity: Never     Partners: Male       History of present illness:  64-year-old female, presents to clinic today for evaluation of complaints of left knee pain.  She has a history of chronic left knee pain.  She has had multiple arthroscopies previously but unfortunately she does not recall the surgeon or any specific postoperative findings.  She will state that the knee has been  cleaned up.      Review of Systems:    Constitution: Negative for chills, fever, and sweats.  Negative for unexplained weight  loss.    HENT:  Negative for headaches and blurry vision.    Cardiovascular:Negative for chest pain or irregular heart beat. Negative for hypertension.    Respiratory:  Negative for cough and shortness of breath.    Gastrointestinal: Negative for abdominal pain, heartburn, melena, nausea, and vomitting.    Genitourinary:  Negative bladder incontinence and dysuria.    Musculoskeletal:  See HPI for details.     Neurological: Negative for numbness.    Psychiatric/Behavioral: Negative for depression.  The patient is not nervous/anxious.      Endocrine: Negative for polyuria    Hematologic/Lymphatic: Negative for bleeding problem.  Does not bruise/bleed easily.    Skin: Negative for poor would healing and rash    Objective:      Physical Examination:    Vital Signs:  There were no vitals filed for this visit.    Body mass index is 20.14 kg/m².    This a well-developed, well nourished patient in no acute distress.  They are alert and oriented and cooperative to examination.        Examination of the left knee, skin is dry and intact, no erythema or ecchymosis, no signs symptoms of infection, no effusion, range of motion 0-120 degrees.  Stable anterior-posterior varus and valgus stress.  Calf soft nontender, straight leg raise negative.      Pertinent New Results:    XRAY Report / Interpretation:   AP lateral sunrise views of the left knee taken today in the office demonstrate some narrowing of the patellofemoral joint space on the lateral image.  But no significant loss of joint space on standing AP image.    Assessment/Plan:      Left knee pain, I suspect underlying osteoarthritis.  With a history of prior arthroscopies for again what I suspect is meniscal tearing.  She probably has significant degenerative arthritis with subsequent degenerative meniscal tears responding to the arthroscopy briefly.  She may have more underlying arthritis in her x-rays show.  We injected the left knee today lidocaine and triamcinolone  "anterior lateral approach sterile technique.  We will check her back in 3 months.  If she responds to the injection that will be great.  But if she does not respond plan would be to do an MRI prior to her follow-up visit to evaluate the weight-bearing surfaces.  We discussed total knee arthroplasty in great detail today.    Sid De Luna, Physician Assistant, served in the capacity as a "scribe" for this patient encounter.  A "face-to-face" encounter occurred with Dr. Ra Franklin on this date.  The treatment plan and medical decision-making is outlined above. Patient was seen and examined with a chaperone.       This note was created using Dragon voice recognition software that occasionally misinterpreted phrases or words.          "

## 2024-04-02 NOTE — PROCEDURES
Large Joint Aspiration/Injection: L knee    Date/Time: 4/2/2024 11:15 AM    Performed by: Ra Franklin MD  Authorized by: Ra Franklin MD    Consent Done?:  Yes (Verbal)  Indications:  Pain and arthritis  Site marked: the procedure site was marked    Timeout: prior to procedure the correct patient, procedure, and site was verified    Prep: patient was prepped and draped in usual sterile fashion      Local anesthesia used?: Yes    Local anesthetic:  Lidocaine 1% without epinephrine    Details:  Needle Size:  25 G  Ultrasonic Guidance for needle placement?: No    Location:  Knee  Site:  L knee  Medications:  40 mg triamcinolone acetonide 40 mg/mL  Patient tolerance:  Patient tolerated the procedure well with no immediate complications

## 2024-04-05 ENCOUNTER — TELEPHONE (OUTPATIENT)
Dept: PULMONOLOGY | Facility: CLINIC | Age: 64
End: 2024-04-05

## 2024-04-05 NOTE — TELEPHONE ENCOUNTER
----- Message from Elia Mcginnis sent at 4/5/2024  4:17 PM CDT -----  Contact: pt  Type:  Needs Medical Advice    Who Called: the patient  Symptoms (please be specific):  How long has patient had these symptoms:    Pharmacy name and phone #:    Would the patient rather a call back or a response via [facility]? call back  Best Call Back Number: [phone]   Additional Information: Pt states she would like to speak to staff to have CT Chest Lung Screening [facility] to [facility]  Please advise  thanks

## 2024-04-08 ENCOUNTER — TELEPHONE (OUTPATIENT)
Dept: PULMONOLOGY | Facility: CLINIC | Age: 64
End: 2024-04-08

## 2024-04-17 ENCOUNTER — TELEPHONE (OUTPATIENT)
Dept: PULMONOLOGY | Facility: CLINIC | Age: 64
End: 2024-04-17

## 2024-04-17 NOTE — TELEPHONE ENCOUNTER
----- Message from Dorina Pineda sent at 4/17/2024 10:44 AM CDT -----  Regarding: Needs return call asap  Type: Needs Medical Advice  Who Called:  Pt    Best Call Back Number: [phone]    Additional Information: Pt is at [facility] right now they have only received one of her CT scans and she was trying to get them all done today, please call as soon as possible to fax orders over

## 2024-04-19 ENCOUNTER — TELEPHONE (OUTPATIENT)
Dept: ORTHOPEDICS | Facility: CLINIC | Age: 64
End: 2024-04-19

## 2024-04-19 DIAGNOSIS — M23.204 DEGENERATIVE TEAR OF MEDIAL MENISCUS, LEFT: Primary | ICD-10-CM

## 2024-05-06 ENCOUNTER — TELEPHONE (OUTPATIENT)
Dept: ORTHOPEDICS | Facility: CLINIC | Age: 64
End: 2024-05-06

## 2024-05-06 DIAGNOSIS — M23.204 DEGENERATIVE TEAR OF MEDIAL MENISCUS, LEFT: Primary | ICD-10-CM

## 2024-05-08 ENCOUNTER — TELEPHONE (OUTPATIENT)
Dept: PULMONOLOGY | Facility: CLINIC | Age: 64
End: 2024-05-08
Payer: MEDICARE

## 2024-05-08 DIAGNOSIS — J44.9 CHRONIC OBSTRUCTIVE PULMONARY DISEASE, UNSPECIFIED COPD TYPE: Primary | ICD-10-CM

## 2024-05-08 NOTE — TELEPHONE ENCOUNTER
Spoke to patient.  Informed her that you do get a powder residue that comes out when you do the Trelegy. Advised on good oral hygiene after use of Trelegy.  Encouraged her to drink plenty of water.      Patient would like to see if she qualifies for oxygen.  Verbalized understanding.

## 2024-05-08 NOTE — TELEPHONE ENCOUNTER
----- Message from Sanket Rendon sent at 5/8/2024  1:55 PM CDT -----  Contact: Self  Type: Needs Medical Advice  Who Called:  PT    Best Call Back Number: [phone]   Additional Information: PT needs to discuss fluticasone-umeclidin-vilanter (TRELEGY ELLIPTA) 200-62.5-25 mcg inhaler.  She doesn't like the side effects.

## 2024-05-10 ENCOUNTER — HOSPITAL ENCOUNTER (OUTPATIENT)
Dept: RADIOLOGY | Facility: HOSPITAL | Age: 64
Discharge: HOME | End: 2024-05-10
Attending: INTERNAL MEDICINE
Payer: MEDICARE

## 2024-05-10 DIAGNOSIS — F17.210 NICOTINE DEPENDENCE, CIGARETTES, UNCOMPLICATED: ICD-10-CM

## 2024-05-10 PROCEDURE — 71271 CT THORAX LUNG CANCER SCR C-: CPT | Mod: 26,,, | Performed by: RADIOLOGY

## 2024-05-10 PROCEDURE — 71271 CT THORAX LUNG CANCER SCR C-: CPT | Mod: TC

## 2024-05-13 ENCOUNTER — TELEPHONE (OUTPATIENT)
Dept: SURGERY | Facility: CLINIC | Age: 64
End: 2024-05-13
Payer: MEDICARE

## 2024-05-13 DIAGNOSIS — R91.8 LUNG NODULES: Primary | ICD-10-CM

## 2024-05-13 NOTE — TELEPHONE ENCOUNTER
----- Message from Ivon Rodolfo sent at 5/13/2024  4:42 PM CDT -----  Regarding: appt request  Contact: pt  Type: Appointment Request    Caller is requesting an appointment.      Name of Caller:pt    When is the first available appointment?none    Symptoms:hernia    Would the patient rather a call back or a response via [facility]? Call back    Best Call Back Number:[phone]    Additional Information: pt is requesting to schedule sx for hernia on right side of stomach. Please advise.  Thank you.

## 2024-05-14 ENCOUNTER — HOSPITAL ENCOUNTER (OUTPATIENT)
Dept: PULMONOLOGY | Facility: HOSPITAL | Age: 64
Discharge: HOME | End: 2024-05-14
Attending: INTERNAL MEDICINE
Payer: MEDICARE

## 2024-05-14 DIAGNOSIS — J44.9 CHRONIC OBSTRUCTIVE PULMONARY DISEASE, UNSPECIFIED COPD TYPE: ICD-10-CM

## 2024-05-14 DIAGNOSIS — J44.9 CHRONIC OBSTRUCTIVE PULMONARY DISEASE, UNSPECIFIED COPD TYPE: Primary | ICD-10-CM

## 2024-05-14 PROCEDURE — 94618 PULMONARY STRESS TESTING: CPT | Mod: 26,,, | Performed by: INTERNAL MEDICINE

## 2024-05-14 PROCEDURE — 94618 PULMONARY STRESS TESTING: CPT
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
## 2024-05-15 ENCOUNTER — TELEPHONE (OUTPATIENT)
Dept: PULMONOLOGY | Facility: CLINIC | Age: 64
End: 2024-05-15
Payer: MEDICARE

## 2024-05-15 ENCOUNTER — OFFICE VISIT (OUTPATIENT)
Dept: UROLOGY | Facility: CLINIC | Age: 64
End: 2024-05-15
Payer: MEDICARE

## 2024-05-15 VITALS
HEART RATE: 90 BPM | BODY MASS INDEX: 23.27 KG/M2 | HEIGHT: 66 IN | WEIGHT: 144.81 LBS | DIASTOLIC BLOOD PRESSURE: 103 MMHG | SYSTOLIC BLOOD PRESSURE: 174 MMHG

## 2024-05-15 DIAGNOSIS — R30.0 DYSURIA: Primary | ICD-10-CM

## 2024-05-15 LAB
BILIRUBIN, UA POC OHS: NEGATIVE
BLOOD, UA POC OHS: ABNORMAL
CLARITY, UA POC OHS: ABNORMAL
COLOR, UA POC OHS: YELLOW
GLUCOSE, UA POC OHS: NEGATIVE
KETONES, UA POC OHS: NEGATIVE
LEUKOCYTES, UA POC OHS: ABNORMAL
NITRITE, UA POC OHS: NEGATIVE
PH, UA POC OHS: 6.5
PROTEIN, UA POC OHS: NEGATIVE
SPECIFIC GRAVITY, UA POC OHS: 1.02
UROBILINOGEN, UA POC OHS: 0.2

## 2024-05-15 PROCEDURE — 87077 CULTURE AEROBIC IDENTIFY: CPT | Performed by: STUDENT IN AN ORGANIZED HEALTH CARE EDUCATION/TRAINING PROGRAM

## 2024-05-15 PROCEDURE — 87186 SC STD MICRODIL/AGAR DIL: CPT | Performed by: STUDENT IN AN ORGANIZED HEALTH CARE EDUCATION/TRAINING PROGRAM

## 2024-05-15 PROCEDURE — 3008F BODY MASS INDEX DOCD: CPT | Mod: CPTII,S$GLB,, | Performed by: STUDENT IN AN ORGANIZED HEALTH CARE EDUCATION/TRAINING PROGRAM

## 2024-05-15 PROCEDURE — 87088 URINE BACTERIA CULTURE: CPT | Performed by: STUDENT IN AN ORGANIZED HEALTH CARE EDUCATION/TRAINING PROGRAM

## 2024-05-15 PROCEDURE — 99204 OFFICE O/P NEW MOD 45 MIN: CPT | Mod: S$GLB,,, | Performed by: STUDENT IN AN ORGANIZED HEALTH CARE EDUCATION/TRAINING PROGRAM

## 2024-05-15 PROCEDURE — 3077F SYST BP >= 140 MM HG: CPT | Mod: CPTII,S$GLB,, | Performed by: STUDENT IN AN ORGANIZED HEALTH CARE EDUCATION/TRAINING PROGRAM

## 2024-05-15 PROCEDURE — 1159F MED LIST DOCD IN RCRD: CPT | Mod: CPTII,S$GLB,, | Performed by: STUDENT IN AN ORGANIZED HEALTH CARE EDUCATION/TRAINING PROGRAM

## 2024-05-15 PROCEDURE — 4010F ACE/ARB THERAPY RXD/TAKEN: CPT | Mod: CPTII,S$GLB,, | Performed by: STUDENT IN AN ORGANIZED HEALTH CARE EDUCATION/TRAINING PROGRAM

## 2024-05-15 PROCEDURE — 99999 PR PBB SHADOW E&M-EST. PATIENT-LVL IV: CPT | Mod: PBBFAC,,, | Performed by: STUDENT IN AN ORGANIZED HEALTH CARE EDUCATION/TRAINING PROGRAM

## 2024-05-15 PROCEDURE — 87086 URINE CULTURE/COLONY COUNT: CPT | Performed by: STUDENT IN AN ORGANIZED HEALTH CARE EDUCATION/TRAINING PROGRAM

## 2024-05-15 PROCEDURE — 3080F DIAST BP >= 90 MM HG: CPT | Mod: CPTII,S$GLB,, | Performed by: STUDENT IN AN ORGANIZED HEALTH CARE EDUCATION/TRAINING PROGRAM

## 2024-05-15 PROCEDURE — 81003 URINALYSIS AUTO W/O SCOPE: CPT | Mod: QW,S$GLB,, | Performed by: STUDENT IN AN ORGANIZED HEALTH CARE EDUCATION/TRAINING PROGRAM

## 2024-05-15 RX ORDER — SULFAMETHOXAZOLE AND TRIMETHOPRIM 800; 160 MG/1; MG/1
1 TABLET ORAL 2 TIMES DAILY
Qty: 10 TABLET | Refills: 0 | Status: SHIPPED | OUTPATIENT
Start: 2024-05-15 | End: 2024-05-19

## 2024-05-15 NOTE — TELEPHONE ENCOUNTER
----- Message from Pedro Estevez sent at 5/15/2024  4:53 PM CDT -----  Regarding: advice  Contact: patient  Type: Needs Medical Advice  Who Called:  patient   Symptoms (please be specific):    How long has patient had these symptoms:    Pharmacy name and phone #:    Best Call Back Number: [phone]  Additional Information: Pt stated that she is still having problems with the inhaler that was given. Pt also have question about her results from her lung scan. Please call to advice. Thanks

## 2024-05-16 NOTE — PROGRESS NOTES
5/16/2024    The patient relates she did improve with prednisone but Trelegy cause her dry mouth.  She uses albuterol as needed.      CT scan did show a ground-glass opacity that was really 10 mm or so.  PET scan was ordered.  She had 2 other nodules along the did not look is worrisome.  She is to get a CT scan with a PET scan next month.  Will review.  Will also discuss inhaler options.  See at follow-up.

## 2024-05-17 LAB — BACTERIA UR CULT: ABNORMAL

## 2024-05-18 ENCOUNTER — HOSPITAL ENCOUNTER (OUTPATIENT)
Dept: RADIOLOGY | Facility: HOSPITAL | Age: 64
Discharge: HOME | End: 2024-05-18
Attending: PHYSICIAN ASSISTANT
Payer: MEDICARE

## 2024-05-18 DIAGNOSIS — M23.204 DEGENERATIVE TEAR OF MEDIAL MENISCUS, LEFT: ICD-10-CM

## 2024-05-18 PROCEDURE — 73721 MRI JNT OF LWR EXTRE W/O DYE: CPT | Mod: 26,LT,, | Performed by: RADIOLOGY

## 2024-05-18 PROCEDURE — 73721 MRI JNT OF LWR EXTRE W/O DYE: CPT | Mod: TC,LT

## 2024-05-19 RX ORDER — NITROFURANTOIN 25; 75 MG/1; MG/1
100 CAPSULE ORAL 2 TIMES DAILY
Qty: 14 CAPSULE | Refills: 0 | Status: SHIPPED | OUTPATIENT
Start: 2024-05-19 | End: 2024-05-26

## 2024-05-21 ENCOUNTER — TELEPHONE (OUTPATIENT)
Dept: PULMONOLOGY | Facility: CLINIC | Age: 64
End: 2024-05-21
Payer: MEDICARE

## 2024-05-21 NOTE — TELEPHONE ENCOUNTER
Spoke to pt, [facility] is going to reach out to her   ----- Message from Ladonna Gtz sent at 5/21/2024  4:04 PM CDT -----  Contact: Courtney [phone]  Type: Needs Medical Advice  Who Called:  Courtney  Best Call Back Number: [phone]   Additional Information: pt wants to know if office will give her a call about her oxygen machine, came in last week and have not heard back from office.

## 2024-05-22 ENCOUNTER — TELEPHONE (OUTPATIENT)
Dept: PULMONOLOGY | Facility: CLINIC | Age: 64
End: 2024-05-22
Payer: MEDICARE

## 2024-05-22 NOTE — TELEPHONE ENCOUNTER
Pt is scheduled for delivery tomorrow.     ----- Message from Dorina Pineda sent at 5/22/2024 12:26 PM CDT -----  Regarding: Needs return call today  Type: Needs Medical Advice  Who Called:  Pt    Best Call Back Number: [phone]    Additional Information: Pt states she spoke to the nurse yesterday and it was regarding her oxygen not being sent. She was told the company would call her immediately and they never did. She was wanting to talk to the office regarding what she should do please advise as soon as possible

## 2024-05-23 ENCOUNTER — OFFICE VISIT (OUTPATIENT)
Dept: ORTHOPEDICS | Facility: CLINIC | Age: 64
End: 2024-05-23
Payer: MEDICARE

## 2024-05-23 VITALS — HEIGHT: 66 IN | BODY MASS INDEX: 22.5 KG/M2 | WEIGHT: 140 LBS

## 2024-05-23 DIAGNOSIS — M17.12 PRIMARY OSTEOARTHRITIS OF LEFT KNEE: Primary | ICD-10-CM

## 2024-05-23 PROCEDURE — 99213 OFFICE O/P EST LOW 20 MIN: CPT | Mod: S$GLB,,, | Performed by: ORTHOPAEDIC SURGERY

## 2024-05-23 PROCEDURE — 3008F BODY MASS INDEX DOCD: CPT | Mod: CPTII,S$GLB,, | Performed by: ORTHOPAEDIC SURGERY

## 2024-05-23 PROCEDURE — 1160F RVW MEDS BY RX/DR IN RCRD: CPT | Mod: CPTII,S$GLB,, | Performed by: ORTHOPAEDIC SURGERY

## 2024-05-23 PROCEDURE — 4010F ACE/ARB THERAPY RXD/TAKEN: CPT | Mod: CPTII,S$GLB,, | Performed by: ORTHOPAEDIC SURGERY

## 2024-05-23 PROCEDURE — 1159F MED LIST DOCD IN RCRD: CPT | Mod: CPTII,S$GLB,, | Performed by: ORTHOPAEDIC SURGERY

## 2024-05-23 NOTE — PROGRESS NOTES
[facility]    Subjective:     Chief Complaint:   Chief Complaint   Patient presents with    Left Knee - Pain     Left knee MRI results, knee pain is getting worse,        Past Medical History:   Diagnosis Date    Arthritis     Chronic back pain     Encounter for blood transfusion     Fatigue     Fibromyalgia     GERD (gastroesophageal reflux disease)     Herniated disc     Multiple sclerosis     Ovarian tumor     Radiculopathy     Restless legs syndrome (RLS)     Spasticity     Stroke     twice    TIA (transient ischemic attack)        Past Surgical History:   Procedure Laterality Date    CEREBRAL ANEURYSM REPAIR      HYSTERECTOMY      age 27    KNEE SURGERY  2008    left    pain pump implant      MA REMOVAL OF OVARY/TUBE(S)      ROBOT-ASSISTED LAPAROSCOPIC REPAIR OF VENTRAL HERNIA Left 2/15/2022    Procedure: ROBOTIC REPAIR, HERNIA, INCISIONAL - LEFT FLANK;  Surgeon: Sixto Clark Jr., MD;  Location: [facility];  Service: General;  Laterality: Left;    SPINE SURGERY  2006      2 on neck.  2 on lumbar spine    TONSILLECTOMY      WRIST SURGERY  2007    right       Current Outpatient Medications   Medication Sig    albuterol (PROVENTIL/VENTOLIN HFA) 90 mcg/actuation inhaler Inhale 2 puffs into the lungs every 4 (four) hours as needed for Wheezing or Shortness of Breath. 2 puffs every 4 hours as needed for cough, wheeze, or shortness of breath    aspirin (ECOTRIN) 81 MG EC tablet Take 1 tablet (81 mg total) by mouth once daily.    azithromycin (ZITHROMAX) 500 MG tablet One daily for yellow mucous, repeat if needed    celecoxib (CELEBREX) 200 MG capsule Take 1 capsule (200 mg total) by mouth once daily.    dantrolene (DANTRIUM) 50 MG Cap Take 50 mg by mouth 3 (three) times daily.    fluticasone-umeclidin-vilanter (TRELEGY ELLIPTA) 200-62.5-25 mcg inhaler Inhale 1 puff into the lungs once daily.    gabapentin (NEURONTIN) 600 MG tablet Take 1 tablet twice a day by oral route for 30 days.     HYDROcodone-acetaminophen (NORCO)  mg per tablet Take 1 tablet by mouth.    irbesartan (AVAPRO) 150 MG tablet Take 150 mg by mouth every evening.    melatonin 5 mg Cap Take 1 capsule by mouth every evening.    multivitamin with minerals tablet Take 1 tablet by mouth once daily.    nitrofurantoin, macrocrystal-monohydrate, (MACROBID) 100 MG capsule Take 1 capsule (100 mg total) by mouth 2 (two) times daily. for 7 days    omeprazole (PRILOSEC) 20 MG capsule     predniSONE (DELTASONE) 20 MG tablet Take one daily for 3 days and may repeat for shortness of breath.    QUEtiapine (SEROQUEL) 25 MG Tab take 1 - 2 tablets BY MOUTH ONCE DAILY IN THE EVENING AS NEEDED    rosuvastatin (CRESTOR) 5 MG tablet Take 5 mg by mouth once daily.    solifenacin (VESICARE) 5 MG tablet Take 10 mg by mouth once daily.    sumatriptan (IMITREX) 100 MG tablet sumatriptan 100 mg tablet   TAKE ONE TABLET BY MOUTH AS NEEDED FOR MIGRAINE, MAY REPEAT in ONE HOUR AS NEEDED FOR HEADACHE max TWO PER DAY    tiZANidine (ZANAFLEX) 4 MG tablet Take 4 mg by mouth 3 (three) times daily.    vitamin E 400 UNIT capsule Take 400 Units by mouth once daily.    zolpidem (AMBIEN) 10 mg Tab Take 5 mg by mouth nightly as needed.    LINZESS 145 mcg Cap capsule Take 145 mcg by mouth.     No current facility-administered medications for this visit.       Review of patient's allergies indicates:   Allergen Reactions    Bee pollens     Bee sting kit Hives    Mushroom flavor Hives       Family History   Problem Relation Name Age of Onset    Cancer Mother          Lung cancer    Cancer Father          Lung cancer       Social History     Socioeconomic History    Marital status:    Tobacco Use    Smoking status: Every Day     Current packs/day: 0.50     Average packs/day: 0.5 packs/day for 30.0 years (15.0 ttl pk-yrs)     Types: Cigarettes    Smokeless tobacco: Never    Tobacco comments:     .    Occup:   disabled.   , lives alone.     Substance and  Sexual Activity    Alcohol use: Yes     Comment: socially    Drug use: No    Sexual activity: Never     Partners: Male     Social Determinants of Health     Financial Resource Strain: Medium Risk (5/15/2024)    Overall Financial Resource Strain (CARDIA)     Difficulty of Paying Living Expenses: Somewhat hard   Food Insecurity: No Food Insecurity (5/15/2024)    Hunger Vital Sign     Worried About Running Out of Food in the Last Year: Never true     Ran Out of Food in the Last Year: Never true   Transportation Needs: Unmet Transportation Needs (5/15/2024)    PRAPARE - Transportation     Lack of Transportation (Medical): Yes     Lack of Transportation (Non-Medical): Yes   Physical Activity: Insufficiently Active (5/15/2024)    Exercise Vital Sign     Days of Exercise per Week: 1 day     Minutes of Exercise per Session: 10 min   Stress: Stress Concern Present (5/15/2024)    [employer] [place] [employer] - Occupational Stress Questionnaire     Feeling of Stress : To some extent   Housing Stability: Unknown (5/15/2024)    Housing Stability Vital Sign     Unable to Pay for Housing in the Last Year: No       History of present illness:  Courtney comes in today for follow-up for her left knee.  She has had her MRI.  Unfortunately, the left knee injection was not efficacious in providing relief for her.  To review, she does have previous history of multiple arthroscopies on this left knee.  She has pain on a daily basis.  It is worse with weight-bearing activities.  MRI was done to evaluate for possible degenerative meniscal tear and to evaluate cartilaginous surfaces of the distal femur and proximal tibia.    Review of Systems:    Constitution: Negative for chills, fever, and sweats.  Negative for unexplained weight loss.    HENT:  Negative for headaches and blurry vision.    Cardiovascular:Negative for chest pain or irregular heart beat. Negative for hypertension.    Respiratory:  Negative for cough and shortness of  breath.    Gastrointestinal: Negative for abdominal pain, heartburn, melena, nausea, and vomitting.    Genitourinary:  Negative bladder incontinence and dysuria.    Musculoskeletal:  See HPI for details.     Neurological: Negative for numbness.    Psychiatric/Behavioral: Negative for depression.  The patient is not nervous/anxious.      Endocrine: Negative for polyuria    Hematologic/Lymphatic: Negative for bleeding problem.  Does not bruise/bleed easily.    Skin: Negative for poor would healing and rash    Objective:      Physical Examination:    Vital Signs:  There were no vitals filed for this visit.    Body mass index is 22.6 kg/m².    This a well-developed, well nourished patient in no acute distress.  They are alert and oriented and cooperative to examination.        Left knee exam:  Skin to left knee clean dry and intact.  No erythema or ecchymosis.  No signs or symptoms of infection.  She is neurovascularly intact throughout the left lower extremity.  Left calf is soft and nontender.  Left knee range of motion 0-120 degrees.  The knee is stable to varus and valgus stresses.  She is diffusely tender both medially and laterally.  She can weightbear as tolerated on the left lower extremity.  There is no effusion.  There is crepitus with range of motioning of the knee.    Pertinent New Results:    XRAY Report / Interpretation:   No new radiographs taken on today's clinic visit.  Did review images and report of the left knee MRI which was significant for areas of grade 3-4 chondromalacia on the weight-bearing medial femoral condyle, lateral femoral condyle, and patella.    Assessment/Plan:      1. Left knee osteoarthritis.      Treatment options were discussed with her today.  Risks and benefits of proceeding with left total knee arthroplasty were explained to her today.  We discussed the outpatient nature of the procedure and the use of the [employer] robot system.  All of her questions were answered in regards to the  "surgery.  She understood and wished to proceed.  Surgical consents were obtained today.    Risks of the procedure were reviewed with the patient.  This includes, but is not limited to:  infection, bleeding, pain, swelling, decreased range of motion, nerve injury/damage, numbness, leg length discrepancy, wound dehiscence, hardware failure, deep vein thrombosis, pulmonary embolism, reflex sympathetic dystrophy, and possible need for further surgery.    The mobility limitation cannot be sufficiently resolved by the use of a cane. Patient's functional mobility deficit can be sufficiently resolved with the use of a (Rolling Walker or Walker). Patient's mobility limitation significantly impairs their ability to participate in one of more activities of daily living. The use of a (RW or Walker) will significantly improve the patient's ability to participate in MRADLS and the patient will use it on regular basis in the home.    Herminio Sandhu, Physician Assistant, served in the capacity as a "scribe" for this patient encounter.  A "face-to-face" encounter occurred with Dr. Ra Franklin on this date.  The treatment plan and medical decision-making is outlined above. Patient was seen and examined with a chaperone.       This note was created using Dragon voice recognition software that occasionally misinterpreted phrases or words.          "

## 2024-05-24 ENCOUNTER — HOSPITAL ENCOUNTER (OUTPATIENT)
Dept: RADIOLOGY | Facility: HOSPITAL | Age: 64
Discharge: HOME | End: 2024-05-24
Attending: STUDENT IN AN ORGANIZED HEALTH CARE EDUCATION/TRAINING PROGRAM
Payer: MEDICARE

## 2024-05-24 DIAGNOSIS — R30.0 DYSURIA: ICD-10-CM

## 2024-05-24 PROCEDURE — 76770 US EXAM ABDO BACK WALL COMP: CPT | Mod: TC

## 2024-05-24 PROCEDURE — 76770 US EXAM ABDO BACK WALL COMP: CPT | Mod: 26,,, | Performed by: RADIOLOGY

## 2024-06-03 ENCOUNTER — HOSPITAL ENCOUNTER (OUTPATIENT)
Dept: RADIOLOGY | Facility: HOSPITAL | Age: 64
Discharge: HOME | End: 2024-06-03
Attending: INTERNAL MEDICINE
Payer: MEDICARE

## 2024-06-03 VITALS — WEIGHT: 140 LBS | BODY MASS INDEX: 22.5 KG/M2 | HEIGHT: 66 IN

## 2024-06-03 DIAGNOSIS — R91.8 LUNG NODULES: ICD-10-CM

## 2024-06-03 DIAGNOSIS — R91.1 SOLITARY PULMONARY NODULE: Primary | ICD-10-CM

## 2024-06-03 LAB — GLUCOSE SERPL-MCNC: 115 MG/DL (ref 70–110)

## 2024-06-03 PROCEDURE — A9552 F18 FDG: HCPCS | Mod: PO | Performed by: INTERNAL MEDICINE

## 2024-06-03 PROCEDURE — 82962 GLUCOSE BLOOD TEST: CPT | Mod: PO

## 2024-06-03 PROCEDURE — 78815 PET IMAGE W/CT SKULL-THIGH: CPT | Mod: 26,PI,, | Performed by: RADIOLOGY

## 2024-06-03 PROCEDURE — 78815 PET IMAGE W/CT SKULL-THIGH: CPT | Mod: TC,PO

## 2024-06-03 RX ORDER — FLUDEOXYGLUCOSE F18 500 MCI/ML
12.5 INJECTION INTRAVENOUS
Status: COMPLETED | OUTPATIENT
Start: 2024-06-03 | End: 2024-06-03

## 2024-06-03 RX ADMIN — FLUDEOXYGLUCOSE F-18 12.5 MILLICURIE: 500 INJECTION INTRAVENOUS at 01:06

## 2024-06-04 ENCOUNTER — PATIENT MESSAGE (OUTPATIENT)
Dept: ORTHOPEDICS | Facility: CLINIC | Age: 64
End: 2024-06-04
Payer: MEDICARE

## 2024-06-06 ENCOUNTER — TELEPHONE (OUTPATIENT)
Dept: PULMONOLOGY | Facility: CLINIC | Age: 64
End: 2024-06-06
Payer: MEDICARE

## 2024-06-11 ENCOUNTER — TELEPHONE (OUTPATIENT)
Dept: PULMONOLOGY | Facility: CLINIC | Age: 64
End: 2024-06-11

## 2024-06-11 ENCOUNTER — OFFICE VISIT (OUTPATIENT)
Dept: PULMONOLOGY | Facility: CLINIC | Age: 64
End: 2024-06-11
Payer: MEDICARE

## 2024-06-11 VITALS
SYSTOLIC BLOOD PRESSURE: 161 MMHG | BODY MASS INDEX: 22.5 KG/M2 | DIASTOLIC BLOOD PRESSURE: 90 MMHG | OXYGEN SATURATION: 97 % | HEIGHT: 66 IN | WEIGHT: 140 LBS | HEART RATE: 85 BPM

## 2024-06-11 DIAGNOSIS — J44.9 CHRONIC OBSTRUCTIVE PULMONARY DISEASE, UNSPECIFIED COPD TYPE: ICD-10-CM

## 2024-06-11 DIAGNOSIS — J96.11 CHRONIC HYPOXEMIC RESPIRATORY FAILURE: ICD-10-CM

## 2024-06-11 DIAGNOSIS — F17.210 NICOTINE DEPENDENCE, CIGARETTES, UNCOMPLICATED: ICD-10-CM

## 2024-06-11 DIAGNOSIS — R91.1 SOLITARY PULMONARY NODULE: Primary | ICD-10-CM

## 2024-06-11 PROCEDURE — 3077F SYST BP >= 140 MM HG: CPT | Mod: CPTII,S$GLB,, | Performed by: INTERNAL MEDICINE

## 2024-06-11 PROCEDURE — 99999 PR PBB SHADOW E&M-EST. PATIENT-LVL V: CPT | Mod: PBBFAC,,, | Performed by: INTERNAL MEDICINE

## 2024-06-11 PROCEDURE — 3080F DIAST BP >= 90 MM HG: CPT | Mod: CPTII,S$GLB,, | Performed by: INTERNAL MEDICINE

## 2024-06-11 PROCEDURE — 3008F BODY MASS INDEX DOCD: CPT | Mod: CPTII,S$GLB,, | Performed by: INTERNAL MEDICINE

## 2024-06-11 PROCEDURE — 99214 OFFICE O/P EST MOD 30 MIN: CPT | Mod: S$GLB,,, | Performed by: INTERNAL MEDICINE

## 2024-06-11 PROCEDURE — 4010F ACE/ARB THERAPY RXD/TAKEN: CPT | Mod: CPTII,S$GLB,, | Performed by: INTERNAL MEDICINE

## 2024-06-11 PROCEDURE — 1159F MED LIST DOCD IN RCRD: CPT | Mod: CPTII,S$GLB,, | Performed by: INTERNAL MEDICINE

## 2024-06-11 RX ORDER — TIOTROPIUM BROMIDE INHALATION SPRAY 3.12 UG/1
5 SPRAY, METERED RESPIRATORY (INHALATION) DAILY
Qty: 4 G | Refills: 11 | Status: SHIPPED | OUTPATIENT
Start: 2024-06-11

## 2024-06-11 RX ORDER — PREDNISONE 20 MG/1
TABLET ORAL
Qty: 21 TABLET | Refills: 2 | Status: SHIPPED | OUTPATIENT
Start: 2024-06-11

## 2024-06-11 RX ORDER — FLUTICASONE PROPIONATE AND SALMETEROL 500; 50 UG/1; UG/1
1 POWDER RESPIRATORY (INHALATION) 2 TIMES DAILY
Qty: 60 EACH | Refills: 11 | Status: SHIPPED | OUTPATIENT
Start: 2024-06-11 | End: 2025-06-11

## 2024-06-11 NOTE — PROGRESS NOTES
2024    Courtney Reilly  New Patient Consult    Chief Complaint   Patient presents with    Follow-up    COPD       HPI:    2024-pt took prednisone recently -- mucous sl brown, and sob/temple severe.   Pt not uses trelegy daily   From sticky no done 4 weeks ago-Notify CT of the chest is viewed by direct vision-3 nodules are seen.  Would recommend a PET scan in a month.  Would recommend office follow-up around that time to review.  Notify.  From PET scan done on -Impression:     Decreased size of the spiculated nodules within the right upper lobe with mild emphysematous changes.  There is no FDG activity.  Follow-up CT chest in 6 months is recommended     No abnormal FDG activity        Electronically signed by:Becky Naranjo  Date:                                            2024 pt smoker with chr back pain, son with substance use  recently--     Pt has excess hardware in back from back dz.  Mom and dad both had lung cancer along with uncles and granddad.       Pt had had pneumonia 2/yr, got pneumonia vaccine.    Used bronchitis rx with once yearly or so-- not worse at night..    Still smokes..     Patient Instructions   Ct chest  viewed and lungs looked good with min emphysema -- ct chest at [facility] 2024 no lung lesion (cannot view films).      You have chronic bronchitis and high risk for lung cancer...    Could measure lung capacity with breathing test -- you should have some degree of copd    Trial trelegy once daily reasonable-- should decrease broncitis.    Use albuterol as needed  if more cough or short breath.  May use prednisone if albuterol not clearing bronchitis.    Azithromycin may be used for infection if needed    You may have had immune weakness.      Ct chest 2024 with no worrisome findings     Ct screening would be reasonable for lung cancer as you are high risk.   Computer suggest not covered well-- may be.  Will order standing order for ct yearly for 5  yrs.  May do ct til age 82    You need to stop smokes....  PFSH:  Past Medical History:   Diagnosis Date    Arthritis     Chronic back pain     Encounter for blood transfusion     Fatigue     Fibromyalgia     GERD (gastroesophageal reflux disease)     Herniated disc     Multiple sclerosis     Ovarian tumor     Radiculopathy     Restless legs syndrome (RLS)     Spasticity     Stroke     twice    TIA (transient ischemic attack)          Past Surgical History:   Procedure Laterality Date    CEREBRAL ANEURYSM REPAIR      HYSTERECTOMY      age 27    KNEE SURGERY  2008    left    pain pump implant      ME REMOVAL OF OVARY/TUBE(S)      ROBOT-ASSISTED LAPAROSCOPIC REPAIR OF VENTRAL HERNIA Left 2/15/2022    Procedure: ROBOTIC REPAIR, HERNIA, INCISIONAL - LEFT FLANK;  Surgeon: Sixto Clark Jr., MD;  [facility]: [facility];  Service: General;  Laterality: Left;    SPINE SURGERY        2 on neck.  2 on lumbar spine    TONSILLECTOMY      WRIST SURGERY  2007    right     Social History     Tobacco Use    Smoking status: Every Day     Current packs/day: 0.50     Average packs/day: 0.5 packs/day for 30.0 years (15.0 ttl pk-yrs)     Types: Cigarettes    Smokeless tobacco: Never    Tobacco comments:     .    Occup:   disabled.   , lives alone.     Substance Use Topics    Alcohol use: Yes     Comment: socially    Drug use: No     Family History   Problem Relation Name Age of Onset    Cancer Mother          Lung cancer    Cancer Father          Lung cancer     Review of patient's allergies indicates:   Allergen Reactions    Bee pollens     Bee sting kit Hives    Mushroom flavor Hives          Review of Systems:  a review of eleven systems covering constitutional, Eye, HEENT, Psych, Respiratory, Cardiac, GI, , Musculoskeletal, Endocrine, Dermatologic was negative except for pertinent findings as listed ABOVE and belo     pertinent positives as above, rest good        Exam:Comprehensive exam done. BP (!) 161/90 (BP  "Location: Left arm, Patient Position: Sitting, BP Method: Small (Automatic))   Pulse 85   Ht 5' 6" (1.676 m)   Wt 63.5 kg (139 lb 15.9 oz)   SpO2 97% Comment: on room air at rest  BMI 22.60 kg/m²   Exam included Vitals as listed, and patient's appearance and affect and alertness and mood, oral exam for yeast and hygiene and pharynx lesions and Mallapatti (M) score, neck with inspection for jvd and masses and thyroid abnormalities and lymph nodes (supraclavicular and infraclavicular nodes and axillary also examined and noted if abn), chest exam included symmetry and effort and fremitus and percussion and auscultation, cardiac exam included rhythm and gallops and murmur and rubs and jvd and edema, abdominal exam for mass and hepatosplenomegaly and tenderness and hernias and bowel sounds, Musculoskeletal exam with muscle tone and posture and mobility/gait and  strength, and skin for rashes and cyanosis and pallor and turgor, extremity for clubbing.  Findings were normal except for pertinent findings listed below:  M1,chest is symmetric, no distress, normal percussion, normal fremitus and good normal breath sounds           Radiographs (ct chest and cxr) reviewed: view by direct vision      Labs reviewed           PFT will be done and results to be reviewed       Plan:  Clinical impression is apparently straight forward and impression with management as below.     Courtney was seen today for follow-up and copd.    Diagnoses and all orders for this visit:    Solitary pulmonary nodule  -     CT Chest Without Contrast; Future    Chronic obstructive pulmonary disease, unspecified COPD type  -     predniSONE (DELTASONE) 20 MG tablet; Take one daily for 3 days and may repeat for shortness of breath.  -     fluticasone-salmeterol diskus inhaler 500-50 mcg; Inhale 1 puff into the lungs 2 (two) times daily. Controller  -     tiotropium bromide (SPIRIVA RESPIMAT) 2.5 mcg/actuation inhaler; Inhale 2 puffs into the lungs Daily. " Controller  -     Culture, Respiratory with Gram Stain; Future    Chronic hypoxemic respiratory failure    Nicotine dependence, cigarettes, uncomplicated          Follow up in about 3 months (around 9/11/2024), or if symptoms worsen or fail to improve.    Discussed with patient above for education the following:      Patient Instructions   Lung nodules did not have pet scan cancer type activity -- nodules seen on ct  5.2024 and not seen on ct 2010 - viewed.      Ct needs follow up in 6 months  to make sure no cancer    Use advair 500 twice daily    Try spriva 1-2 daiy ( stop    dry mouth)-- use dosing to get breathing optimal      Use prednisone more-- inhaled steroid may make more stable.    Use prednisoen up to weekly for next month (if needed) -- advair should keep stable      Use albuterol as much as needed.    May use azithromycin for colored mucous.  Would do culture as mucous remains brown (smoking)      Ct scan and pet scan did not shoe cause for back pains....  Evaluation took 33 minutes

## 2024-06-11 NOTE — TELEPHONE ENCOUNTER
Faxed to Kari the signed verification of chronic condition (VCC) to [phone]. Originals given back to patient and copy with us to be scanned.

## 2024-06-11 NOTE — PATIENT INSTRUCTIONS
Lung nodules did not have pet scan cancer type activity -- nodules seen on ct  5.2024 and not seen on ct 2010 - viewed.      Ct needs follow up in 6 months  to make sure no cancer    Use advair 500 twice daily    Try spriva 1-2 daiy ( stop    dry mouth)-- use dosing to get breathing optimal      Use prednisone more-- inhaled steroid may make more stable.    Use prednisoen up to weekly for next month (if needed) -- advair should keep stable      Use albuterol as much as needed.    May use azithromycin for colored mucous.  Would do culture as mucous remains brown (smoking)      Ct scan and pet scan did not shoe cause for back pains....

## 2024-06-12 PROBLEM — J96.11 CHRONIC HYPOXEMIC RESPIRATORY FAILURE: Status: ACTIVE | Noted: 2024-06-12

## 2024-06-12 PROBLEM — R91.1 SOLITARY PULMONARY NODULE: Status: ACTIVE | Noted: 2024-06-12

## 2024-06-21 DIAGNOSIS — Z01.818 PRE-OP TESTING: ICD-10-CM

## 2024-06-21 DIAGNOSIS — M17.12 PRIMARY OSTEOARTHRITIS OF LEFT KNEE: Primary | ICD-10-CM

## 2024-06-21 RX ORDER — CEFAZOLIN SODIUM 2 G/50ML
2 SOLUTION INTRAVENOUS
OUTPATIENT
Start: 2024-06-21

## 2024-06-25 ENCOUNTER — OFFICE VISIT (OUTPATIENT)
Dept: URGENT CARE | Facility: CLINIC | Age: 64
End: 2024-06-25
Payer: MEDICARE

## 2024-06-25 VITALS
HEART RATE: 99 BPM | WEIGHT: 140 LBS | TEMPERATURE: 99 F | SYSTOLIC BLOOD PRESSURE: 117 MMHG | RESPIRATION RATE: 18 BRPM | DIASTOLIC BLOOD PRESSURE: 79 MMHG | OXYGEN SATURATION: 95 % | HEIGHT: 66 IN | BODY MASS INDEX: 22.5 KG/M2

## 2024-06-25 DIAGNOSIS — Z91.038 ALLERGIC REACTION TO INSECT BITE: Primary | ICD-10-CM

## 2024-06-25 PROCEDURE — 96372 THER/PROPH/DIAG INJ SC/IM: CPT | Mod: S$GLB,,, | Performed by: NURSE PRACTITIONER

## 2024-06-25 PROCEDURE — 99204 OFFICE O/P NEW MOD 45 MIN: CPT | Mod: 25,S$GLB,, | Performed by: NURSE PRACTITIONER

## 2024-06-25 RX ORDER — HYDROXYZINE PAMOATE 25 MG/1
25 CAPSULE ORAL EVERY 4 HOURS PRN
Qty: 20 CAPSULE | Refills: 0 | Status: SHIPPED | OUTPATIENT
Start: 2024-06-25

## 2024-06-25 RX ORDER — EPINEPHRINE 0.3 MG/.3ML
1 INJECTION SUBCUTANEOUS ONCE
Qty: 0.3 ML | Refills: 3 | Status: SHIPPED | OUTPATIENT
Start: 2024-06-25 | End: 2024-06-25

## 2024-06-25 RX ORDER — HYDROCORTISONE 1 %
CREAM (GRAM) TOPICAL 2 TIMES DAILY
Qty: 20 G | Refills: 0 | Status: SHIPPED | OUTPATIENT
Start: 2024-06-25

## 2024-06-25 RX ORDER — DEXAMETHASONE SODIUM PHOSPHATE 4 MG/ML
8 INJECTION, SOLUTION INTRA-ARTICULAR; INTRALESIONAL; INTRAMUSCULAR; INTRAVENOUS; SOFT TISSUE
Status: COMPLETED | OUTPATIENT
Start: 2024-06-25 | End: 2024-06-25

## 2024-06-25 RX ORDER — FAMOTIDINE 20 MG/1
20 TABLET, FILM COATED ORAL 2 TIMES DAILY
Qty: 6 TABLET | Refills: 0 | Status: SHIPPED | OUTPATIENT
Start: 2024-06-25 | End: 2024-06-28

## 2024-06-25 RX ORDER — DEXAMETHASONE SODIUM PHOSPHATE 100 MG/10ML
10 INJECTION INTRAMUSCULAR; INTRAVENOUS
Status: DISCONTINUED | OUTPATIENT
Start: 2024-06-25 | End: 2024-06-25

## 2024-06-25 RX ADMIN — DEXAMETHASONE SODIUM PHOSPHATE 8 MG: 4 INJECTION, SOLUTION INTRA-ARTICULAR; INTRALESIONAL; INTRAMUSCULAR; INTRAVENOUS; SOFT TISSUE at 05:06

## 2024-06-25 NOTE — PROGRESS NOTES
"Subjective:      Patient ID: Courtney Reilly is a 64 y.o. female.    Vitals:  height is 5' 6" (1.676 m) and weight is 63.5 kg (140 lb). Her temperature is 98.5 °F (36.9 °C). Her blood pressure is 117/79 and her pulse is 99. Her respiration is 18 and oxygen saturation is 95%.     Chief Complaint: Insect Bite    Patient presents to clinic with complaints of itching and swelling to right hand.  Darren was stung by wasps yesterday evening.  Darren has been taking over-the-counter antihistamine with no relief in symptoms.  Denies throat swelling or shortness for breath.  Additionally patient requesting refill of epinephrine pen.    Insect Bite  This is a new problem. The current episode started yesterday. The problem occurs constantly. Pertinent negatives include no abdominal pain, arthralgias, chest pain, chills, congestion, coughing, fatigue, fever, headaches, joint swelling, myalgias, nausea, neck pain, numbness, rash, sore throat, vertigo, vomiting or weakness.       Constitution: Negative for activity change, appetite change, chills, fatigue, fever, unexpected weight change and generalized weakness.   HENT:  Negative for ear pain, ear discharge, foreign body in ear, tinnitus, hearing loss, dental problem, mouth sores, tongue pain, facial swelling, congestion, postnasal drip, sinus pain, sinus pressure, sore throat, trouble swallowing and voice change.    Neck: Negative for neck pain, neck stiffness and painful lymph nodes.   Cardiovascular:  Negative for chest pain, leg swelling, palpitations and sob on exertion.   Eyes:  Negative for eye trauma, eye discharge, eye itching, eye pain, eye redness, vision loss and eyelid swelling.   Respiratory:  Negative for chest tightness, cough, sputum production, COPD, shortness of breath, wheezing and asthma.    Gastrointestinal:  Negative for abdominal pain, nausea, vomiting, constipation, diarrhea, bright red blood in stool and dark colored stools.   Endocrine: hair loss, cold " intolerance and heat intolerance.   Genitourinary:  Negative for dysuria, frequency, urgency and hematuria.   Musculoskeletal:  Negative for pain, trauma, joint pain, joint swelling, abnormal ROM of joint and muscle ache.   Skin:  Negative for color change, pale, rash, wound and hives.        Swelling and itching right hand   Allergic/Immunologic: Negative for environmental allergies, seasonal allergies, food allergies, asthma, hives and itching.   Neurological:  Negative for dizziness, history of vertigo, light-headedness, facial drooping, speech difficulty, headaches, disorientation, altered mental status, loss of consciousness and numbness.   Hematologic/Lymphatic: Negative for swollen lymph nodes and easy bruising/bleeding. Does not bruise/bleed easily.   Psychiatric/Behavioral:  Negative for altered mental status, disorientation, confusion, agitation, sleep disturbance and hallucinations.       Objective:     Physical Exam   Constitutional: She is oriented to person, place, and time. She appears well-developed. She is cooperative.   HENT:   Head: Normocephalic and atraumatic.   Ears:   Right Ear: Hearing, tympanic membrane, external ear and ear canal normal.   Left Ear: Hearing, tympanic membrane, external ear and ear canal normal.   Nose: Nose normal. No mucosal edema or nasal deformity. No epistaxis. Right sinus exhibits no maxillary sinus tenderness and no frontal sinus tenderness. Left sinus exhibits no maxillary sinus tenderness and no frontal sinus tenderness.   Mouth/Throat: Uvula is midline, oropharynx is clear and moist and mucous membranes are normal. No trismus in the jaw. Normal dentition. No uvula swelling.   Eyes: Conjunctivae and lids are normal.   Neck: Trachea normal and phonation normal. Neck supple.   Cardiovascular: Normal rate, regular rhythm, normal heart sounds and normal pulses.   Pulmonary/Chest: Effort normal and breath sounds normal.   Abdominal: Normal appearance and bowel sounds are  normal. Soft.   Musculoskeletal: Normal range of motion.         General: Normal range of motion.   Neurological: She is alert and oriented to person, place, and time. She exhibits normal muscle tone.   Skin: Skin is warm, dry and intact.        Psychiatric: Her speech is normal and behavior is normal. Judgment and thought content normal.   Nursing note and vitals reviewed.      Assessment:     1. Allergic reaction to insect bite        Plan:       Allergic reaction to insect bite  -     dexAMETHasone injection 8 mg administered intramuscularly in clinic.  -     famotidine (PEPCID) 20 MG tablet; Take 1 tablet (20 mg total) by mouth 2 (two) times daily. for 3 days  Dispense: 6 tablet; Refill: 0  -     hydrOXYzine pamoate (VISTARIL) 25 MG Cap; Take 1 capsule (25 mg total) by mouth every 4 (four) hours as needed (Itching).  Dispense: 20 capsule; Refill: 0  -     EPINEPHrine (EPIPEN 2-MELANIE) 0.3 mg/0.3 mL AtIn; Inject 0.3 mLs (0.3 mg total) into the muscle once. for 1 dose  Dispense: 0.3 mL; Refill: 3      Return to clinic for new or worsening symptoms.  Patient is recommended to follow-up with their PCP post discharge.    Total time spent on med rec, H&P, with over half of the time in direct patient care: 32 minutes       Additional MDM:     Heart Failure Score:   COPD = No

## 2024-06-27 ENCOUNTER — PATIENT MESSAGE (OUTPATIENT)
Dept: ORTHOPEDICS | Facility: CLINIC | Age: 64
End: 2024-06-27
Payer: MEDICARE

## 2024-07-01 ENCOUNTER — HOSPITAL ENCOUNTER (OUTPATIENT)
Dept: RADIOLOGY | Facility: HOSPITAL | Age: 64
Discharge: HOME | End: 2024-07-01
Attending: ORTHOPAEDIC SURGERY
Payer: MEDICARE

## 2024-07-01 ENCOUNTER — HOSPITAL ENCOUNTER (OUTPATIENT)
Dept: PREADMISSION TESTING | Facility: HOSPITAL | Age: 64
Discharge: HOME | End: 2024-07-01
Attending: ORTHOPAEDIC SURGERY
Payer: MEDICARE

## 2024-07-01 VITALS — BODY MASS INDEX: 23.32 KG/M2 | HEIGHT: 65 IN | WEIGHT: 140 LBS

## 2024-07-01 DIAGNOSIS — M17.12 PRIMARY OSTEOARTHRITIS OF LEFT KNEE: ICD-10-CM

## 2024-07-01 DIAGNOSIS — M17.12 PRIMARY OSTEOARTHRITIS OF LEFT KNEE: Primary | ICD-10-CM

## 2024-07-01 DIAGNOSIS — Z01.818 PREOP TESTING: Primary | ICD-10-CM

## 2024-07-01 DIAGNOSIS — Z01.818 PRE-OP TESTING: ICD-10-CM

## 2024-07-01 LAB
ANION GAP SERPL CALC-SCNC: 10 MMOL/L (ref 8–16)
BASOPHILS # BLD AUTO: 0.08 K/UL (ref 0–0.2)
BASOPHILS NFR BLD: 0.8 % (ref 0–1.9)
BUN SERPL-MCNC: 21 MG/DL (ref 8–23)
CALCIUM SERPL-MCNC: 9.5 MG/DL (ref 8.7–10.5)
CHLORIDE SERPL-SCNC: 106 MMOL/L (ref 95–110)
CO2 SERPL-SCNC: 24 MMOL/L (ref 23–29)
CREAT SERPL-MCNC: 0.9 MG/DL (ref 0.5–1.4)
DIFFERENTIAL METHOD BLD: ABNORMAL
EOSINOPHIL # BLD AUTO: 0.6 K/UL (ref 0–0.5)
EOSINOPHIL NFR BLD: 5.7 % (ref 0–8)
ERYTHROCYTE [DISTWIDTH] IN BLOOD BY AUTOMATED COUNT: 13.6 % (ref 11.5–14.5)
EST. GFR  (NO RACE VARIABLE): >60 ML/MIN/1.73 M^2
GLUCOSE SERPL-MCNC: 80 MG/DL (ref 70–110)
HCT VFR BLD AUTO: 41.4 % (ref 37–48.5)
HGB BLD-MCNC: 13.4 G/DL (ref 12–16)
IMM GRANULOCYTES # BLD AUTO: 0.03 K/UL (ref 0–0.04)
IMM GRANULOCYTES NFR BLD AUTO: 0.3 % (ref 0–0.5)
LYMPHOCYTES # BLD AUTO: 3.1 K/UL (ref 1–4.8)
LYMPHOCYTES NFR BLD: 29.9 % (ref 18–48)
MCH RBC QN AUTO: 33.3 PG (ref 27–31)
MCHC RBC AUTO-ENTMCNC: 32.4 G/DL (ref 32–36)
MCV RBC AUTO: 103 FL (ref 82–98)
MONOCYTES # BLD AUTO: 0.9 K/UL (ref 0.3–1)
MONOCYTES NFR BLD: 8.3 % (ref 4–15)
MRSA SCREEN BY PCR: NEGATIVE
NEUTROPHILS # BLD AUTO: 5.8 K/UL (ref 1.8–7.7)
NEUTROPHILS NFR BLD: 55 % (ref 38–73)
NRBC BLD-RTO: 0 /100 WBC
PLATELET # BLD AUTO: 446 K/UL (ref 150–450)
PMV BLD AUTO: 8.6 FL (ref 9.2–12.9)
POTASSIUM SERPL-SCNC: 4.1 MMOL/L (ref 3.5–5.1)
RBC # BLD AUTO: 4.03 M/UL (ref 4–5.4)
SODIUM SERPL-SCNC: 140 MMOL/L (ref 136–145)
WBC # BLD AUTO: 10.51 K/UL (ref 3.9–12.7)

## 2024-07-01 PROCEDURE — 73700 CT LOWER EXTREMITY W/O DYE: CPT | Mod: 26,LT,, | Performed by: RADIOLOGY

## 2024-07-01 PROCEDURE — 87641 MR-STAPH DNA AMP PROBE: CPT | Performed by: ORTHOPAEDIC SURGERY

## 2024-07-01 PROCEDURE — 80048 BASIC METABOLIC PNL TOTAL CA: CPT | Performed by: ORTHOPAEDIC SURGERY

## 2024-07-01 PROCEDURE — 85025 COMPLETE CBC W/AUTO DIFF WBC: CPT | Performed by: ORTHOPAEDIC SURGERY

## 2024-07-01 PROCEDURE — 36415 COLL VENOUS BLD VENIPUNCTURE: CPT | Performed by: ORTHOPAEDIC SURGERY

## 2024-07-01 PROCEDURE — 93005 ELECTROCARDIOGRAM TRACING: CPT

## 2024-07-01 PROCEDURE — 73700 CT LOWER EXTREMITY W/O DYE: CPT | Mod: TC,LT

## 2024-07-01 PROCEDURE — 93010 ELECTROCARDIOGRAM REPORT: CPT | Mod: ,,, | Performed by: INTERNAL MEDICINE

## 2024-07-01 RX ORDER — METHOCARBAMOL 750 MG/1
500 TABLET, FILM COATED ORAL 4 TIMES DAILY
COMMUNITY

## 2024-07-01 NOTE — PRE ADMISSION SCREENING
Patient Name: Courtney CURRAN Cropwell  YOB: 1960   MRN: [id number]     [facility]   Basic Mobility Inpatient Short Form 6 Clicks         How much difficulty does the patient currently have  Unable  A Lot  A Little  None      1. Turning over in bed (including adjusting bedclothes, sheets and blankets)?     1 []    2 []    3 [x]    4 []        2. Sitting down on and standing up from a chair with arms (e.g., wheelchair, bedside commode, etc.)     1 []  2 []  3 []     4 [x]      3. Moving from lying on back to sitting on the side of the bed?     1 []  2 []  3 []    4 [x]    How much help from another person does the patient currently need  Total  A Lot  A Little  None      4. Moving to and from a bed to a chair (including a wheelchair)?    1 []  2 []  3 []    4 [x]      5. Need to walk in hospital room?    1 []  2 []  3 []    4 [x]      6. Climbing 3-5 steps with a railing?    1 []  2 []  3 []    4 [x]       Raw Score:      23            CMS 0-100% Score:   11.20         %   Standardized Score:   56.93            CMS Modifier:       CI                                   [facility]   Basic Mobility Inpatient Short Form 6 Clicks Score Conversion Table*         *Use this form to convert AM-PAC Basic Mobility Inpatient Raw Scores.   [facility] Inpatient Basic Mobility Short Form Scoring Example   1. Add the number values associated with the response to each item. For example, items totals yield a Raw Score of 21.   2. Match the raw score to the t-Scale scores (t-Scale score = 50.25, SE = 4.69).   3. Find the associated CMS % (CMS % = 28.97%).   4. Locate the correct CMS Functional Modifier Code, or G Code (G code = CJ)     NOTE: Each -PAC Short Form has a separate conversion table. Make sure that you use the correct conversion table.       Instruction Manual - page 45 contains conversion table

## 2024-07-01 NOTE — PRE ADMISSION SCREENING
JOINT CAMP ASSESSMENT    Name Courtney Reilly   MRN [id number]    Age/Sex 64 y.o. female    Surgeon Dr. Knapp Finger   Joint Camp Date 7/1/2024   Surgery Date 7/15/2024   Procedure Left Knee Arthroplasty   Insurance Payor: HU[employer] MANAGED MEDICARE / Plan: HU[employer] MEDICARE PPO / Product Type: Medicare Advantage /    Care Team Patient Care Team:  Lalitha Mai MD as PCP - General (Internal Medicine)    Pharmacy   [facility] [place] - [place], [place] - [address] [place]  [address] [place]  [place] [address]  Phone: [phone] Fax: [phone]    [facility] - [facility] [address] [facility]  [address] [facility] [address]  Phone: [phone] Fax: [phone]     AM-PAC Score   23   Risk Assessment Score 25     Past Medical History:   Diagnosis Date    Arthritis     Chronic back pain     Encounter for blood transfusion     Fatigue     Fibromyalgia     GERD (gastroesophageal reflux disease)     Herniated disc     Multiple sclerosis     Ovarian tumor     Radiculopathy     Restless legs syndrome (RLS)     Spasticity     Stroke     twice    TIA (transient ischemic attack)        Past Surgical History:   Procedure Laterality Date    CEREBRAL ANEURYSM REPAIR      HYSTERECTOMY      age 27    KNEE SURGERY  2008    left    pain pump implant      [place] REMOVAL OF OVARY/TUBE(S)      ROBOT-ASSISTED LAPAROSCOPIC REPAIR OF VENTRAL HERNIA Left 2/15/2022    Procedure: ROBOTIC REPAIR, HERNIA, INCISIONAL - LEFT FLANK;  Surgeon: Sixto Clark Jr., MD;  Location: [facility];  Service: General;  Laterality: Left;    SPINE SURGERY  2006      2 on neck.  2 on lumbar spine    TONSILLECTOMY      WRIST SURGERY  2007    right         Home Enviroment     Living Arrangement: Lives alone  Home Environment: 1-story house/ trailer, number of outside stairs: 4 with a railing, walk-in shower  Home Safety Concerns: Pets in the home: cats (1).    DISCHARGE CAREGIVER/SUPPORT SYSTEM     Identified post-op caregiver:  Patient has lives alone and no caregiver available.  Patient's caregiver(s) will not be able to provide physical assistance. Patient will not have someone to assist overnight.      Caregiver present at pre-op interview:  No      PRE-OPERATIVE FUNCTIONAL STATUS     Employment: Disabled    Pre-op Functional Status: Patient is independent with mobility/ambulation, transfers, ADL's, IADL's.    Use of assistive device for ambulation: none  ADL: self care  ADL Limitations: difficulty with walking  Medical Restrictions: Unstable ambulation and Decreased range of motions in extremities    POTENTIAL BARRIERS TO DISCHARGE/POTENTIAL POST-OP COMPLICATIONS     Patient has no caregiver support. Patient is a current everyday smoker which could delay wound healing. Patient with hx of chronic back pain, previous blood transfusion, fibromyalgia, RLS, stroke x 2, TIA. POSSIBLE SAME DAY DISCHARGE.    DISCHARGE PLAN     Expected LOS of 1 days or less for joint replacement discussed with patient. We also discussed a discharge path of HH for approximately two weeks with a transition to outpatient PT on the third week given no post-op complications.      Patient in agreement with discharge plan: Yes    Discharge to: Discharge home with home health (PT/OT) x2 weeks with transition to outpatient PT     HH:  [facility] ([place]). Patient disclosure form completed and sent to case management for upload to the medical record.      OP PT: [facility] ([place])     Home DME: none    Needed DME at D/C: rolling walker and bedside commode. Patient to purchase BSC from retail prior to surgery.     Rx: Per Dr. Frnaklin at discharge     Meds to Beds: Yes  Patient expected to discharge on Aspirin 81mg by mouth twice daily for DVT prophylaxis.

## 2024-07-01 NOTE — DISCHARGE INSTRUCTIONS
To confirm, Your doctor has instructed you that surgery is scheduled for: 7/15/24    Please report to [facility], Registration the morning of surgery. You must check-in and receive a wristband before going to your procedure.  [address] [place] [address] [place] [address]    Pre-Op will call the afternoon prior to surgery between 1:00 and 6:00 PM with the final arrival time.  Phone number: [phone]    PLEASE NOTE:  The surgery schedule has many variables which may affect the time of your surgery case.  Family members should be available if your surgery time changes.  Plan to be here the day of your procedure between 4-6 hours.    MEDICATIONS:  TAKE ONLY THESE MEDICATIONS WITH A SMALL SIP OF WATER THE MORNING OF YOUR PROCEDURE:    See List    DO NOT TAKE THESE MEDICATIONS 5-7 DAYS PRIOR to your procedure or per your surgeon's request:   ASPIRIN, ALEVE, ADVIL, IBUPROFEN, FISH OIL VITAMIN E, HERBALS    (May take Tylenol)    ONLY if you are prescribed any types of blood thinners such as:  Aspirin, Coumadin, Plavix, Pradaxa, Xarelto, Aggrenox, Effient, Eliquis, Savasya, Brilinta, or any other, ask your surgeon whether you should stop taking them and how long before surgery you should stop.  You may also need to verify with the prescribing physician if it is ok to stop your medication.      INSTRUCTIONS IMPORTANT!!  Do not eat or drink anything between midnight and the time of your procedure- this includes gum, mints, and candy.  Do not smoke or drink alcoholic beverages 24 hours prior to your procedure.  Shower the night before AND the morning of your procedure with a Chlorhexidine wash such as Hibiclens or Dial antibacterial soap from the neck down.  Do not get it on your face or in your eyes.  You may use your own shampoo and face wash. This helps your skin to be as bacteria free as possible.    If you wear contact lenses, dentures, hearing aids or glasses, bring a container to put them in during  surgery and give to a family member for safe keeping.  Please leave all jewelry, piercing's and valuables at home. You must remove your false eyelashes prior to surgery.    DO NOT remove hair from the surgery site.  Do not shave the incision site unless you are given specific instructions to do so.    ONLY if you have been diagnosed with sleep apnea please bring your C-PAP machine.  ONLY if you wear home oxygen please bring your portable oxygen tank the day of your procedure.  ONLY if you have a history of OPEN HEART SURGERY you will need a clearance from your Cardiologist per Anesthesia.      ONLY for patients requiring bowel prep, written instructions will be given by your doctor's office.  ONLY if you have a neuro stimulator, please bring the controller with you the morning of surgery  ONLY if a type and screen test is needed before surgery, please return:  If your doctor has scheduled you for an overnight stay, bring a small overnight bag with any personal items you need.  Make arrangements in advance for transportation home by a responsible adult. You can not go home in an uber or a cab per hospital policy.  It is not safe to drive a vehicle during the 24 hours after anesthesia.          All  facilities and properties are tobacco free.  Smoking is NOT allowed.   If you have any questions about these instructions, call Pre-Op Admit  Nursing at [phone] or the Pre-Op Day Surgery Unit at [phone].

## 2024-07-01 NOTE — PRE ADMISSION SCREENING
"               [facility] Risk Assessment Scale    Patient Name: Courtney Reilly  YOB: 1960  MRN: [id number]            RIsk Factor Measure Recommendation Patient Data Scale/Score   BMI >40 Reconsider surgery, weight loss   Estimated body mass index is 23.3 kg/m² as calculated from the following:    Height as of this encounter: 5' 5" (1.651 m).    Weight as of this encounter: 63.5 kg (140 lb).   [x] 0 = 1 - 24.9  [] 1 = 25-29.9  [] 2 = 30-34.9  [] 3 = 35-39.9  [] 4 = 40-44.9  [] 5 = 45-99.9   Hemoglobin AIC (if applicable) >9 Delay surgery until DM under control  Refer for:  Nutrition Therapy  Exercise   Medication    No results found for: "LABA1C", "HGBA1C"    Lab Results   Component Value Date    GLU 80 07/01/2024      [] 0 = 4.0-5.6  [] 1 = 5.7-6.4  [] 2 = 6.5-6.9  [] 3 = 7.0-7.9  [] 4 = 8.0-8.9  [] 5 = 9.0-12   Hemoglobin (Anemia) <9 Delay surgery   Correct anemia Lab Results   Component Value Date    HGB 13.4 07/01/2024    [] 20 - <9.0                    Albumin <3 Delay surgery &Workup Lab Results   Component Value Date    ALBUMIN 4.8 09/28/2023    [] 20 - <3.0   Smoking Cessation >4 Weeks Delay Surgery  Refer to OP Cessation Class    Current Everyday Smoker [x] 20 - current smoker                                _0.5_ PPD                    Hx of MI, PE, Arrhythmia, CVA, DVT <30 Days Delay Surgery    N/A [] 20      Infection Variable Delay surgery and re-evaluate   N/A [] 20 - recent/current infection     Depression (PHQ) >10 out of 27 Delay Surgery and re-evaluate  Medication  Counseling              [x] 0     []1     []2     []3      []4      [] 5                    (1-4)      (5-9)  (10-14)  (15-19)   (20-27)     Memory Impairment & Memory loss (Mini-Cog Screening Tool) Advanced dementia and/or Parkinson's Reconsider surgery     [x] 0     []1     []2     []3     []4     [] 5     Physical Conditioning (Modified AM-PAC Per Physical Therapy at [facility]) Unable to ambulate on day of surgery Delay surgery and " re-evaluate  Pre-Rehabilitation   (PT evaluation)       [x]  0   []4       []8     []12        []16     []20       (<20%)   (<40%)   (<60%)   (<80% )    (>80%)     Home Environment/Caregiver support  (Per /Navigator Interview)    Availability of basic services and/or approprate assistance during post-operative period Delay surgery and re-evaluate  Safe home environment  Health   1 week post-surgery  Transportation  availability  Ability to obtain DME/Medications post-op    [x] 0     []1     []2     []3     []4     [] 5  [] 0     []1     []2     []3     []4     [x] 5  [x] 0     []1     []2     []3     []4     [] 5  [x] 0     []1     []2     []3     []4     [] 5         MD Contact: Dr. Franklin Comments:  Total Score:  25

## 2024-07-02 DIAGNOSIS — M17.12 PRIMARY OSTEOARTHRITIS OF LEFT KNEE: Primary | ICD-10-CM

## 2024-07-02 LAB
OHS QRS DURATION: 74 MS
OHS QTC CALCULATION: 430 MS

## 2024-07-03 ENCOUNTER — TELEPHONE (OUTPATIENT)
Dept: ORTHOPEDICS | Facility: CLINIC | Age: 64
End: 2024-07-03
Payer: MEDICARE

## 2024-07-03 NOTE — TELEPHONE ENCOUNTER
----- Message from Riya Thompson sent at 7/3/2024 10:00 AM CDT -----  Lidia with [facility]--[phone]-she spoke with patient this morning and her insurance has termed and will have to postpone surgery for now, surgery was scheduled 7/15

## 2024-09-16 PROBLEM — J96.11 CHRONIC HYPOXEMIC RESPIRATORY FAILURE: Status: RESOLVED | Noted: 2024-06-12 | Resolved: 2024-09-16

## 2024-09-18 ENCOUNTER — OFFICE VISIT (OUTPATIENT)
Dept: PULMONOLOGY | Facility: CLINIC | Age: 64
End: 2024-09-18
Payer: MEDICAID

## 2024-09-18 VITALS
WEIGHT: 141.19 LBS | DIASTOLIC BLOOD PRESSURE: 96 MMHG | OXYGEN SATURATION: 93 % | HEIGHT: 65 IN | HEART RATE: 90 BPM | SYSTOLIC BLOOD PRESSURE: 154 MMHG | BODY MASS INDEX: 23.52 KG/M2

## 2024-09-18 DIAGNOSIS — F17.210 NICOTINE DEPENDENCE, CIGARETTES, UNCOMPLICATED: Primary | ICD-10-CM

## 2024-09-18 DIAGNOSIS — J44.9 CHRONIC OBSTRUCTIVE PULMONARY DISEASE, UNSPECIFIED COPD TYPE: ICD-10-CM

## 2024-09-18 DIAGNOSIS — J82.83 EOSINOPHILIC ASTHMA: ICD-10-CM

## 2024-09-18 DIAGNOSIS — D75.89 MACROCYTOSIS: ICD-10-CM

## 2024-09-18 DIAGNOSIS — D84.9 IMMUNE DEFICIENCY DISORDER: ICD-10-CM

## 2024-09-18 DIAGNOSIS — J45.50 SEVERE PERSISTENT ASTHMA WITHOUT COMPLICATION: ICD-10-CM

## 2024-09-18 DIAGNOSIS — J44.89 ASTHMA-COPD OVERLAP SYNDROME: ICD-10-CM

## 2024-09-18 PROCEDURE — 3008F BODY MASS INDEX DOCD: CPT | Mod: CPTII,,, | Performed by: INTERNAL MEDICINE

## 2024-09-18 PROCEDURE — 3077F SYST BP >= 140 MM HG: CPT | Mod: CPTII,,, | Performed by: INTERNAL MEDICINE

## 2024-09-18 PROCEDURE — 99999 PR PBB SHADOW E&M-EST. PATIENT-LVL IV: CPT | Mod: PBBFAC,,, | Performed by: INTERNAL MEDICINE

## 2024-09-18 PROCEDURE — 99214 OFFICE O/P EST MOD 30 MIN: CPT | Mod: PBBFAC,PO | Performed by: INTERNAL MEDICINE

## 2024-09-18 PROCEDURE — 1159F MED LIST DOCD IN RCRD: CPT | Mod: CPTII,,, | Performed by: INTERNAL MEDICINE

## 2024-09-18 PROCEDURE — 3080F DIAST BP >= 90 MM HG: CPT | Mod: CPTII,,, | Performed by: INTERNAL MEDICINE

## 2024-09-18 PROCEDURE — 4010F ACE/ARB THERAPY RXD/TAKEN: CPT | Mod: CPTII,,, | Performed by: INTERNAL MEDICINE

## 2024-09-18 PROCEDURE — 99215 OFFICE O/P EST HI 40 MIN: CPT | Mod: S$PBB,,, | Performed by: INTERNAL MEDICINE

## 2024-09-18 RX ORDER — AZITHROMYCIN 500 MG/1
500 TABLET, FILM COATED ORAL DAILY
Qty: 90 TABLET | Refills: 1 | Status: SHIPPED | OUTPATIENT
Start: 2024-09-18

## 2024-09-18 RX ORDER — BUDESONIDE, GLYCOPYRROLATE, AND FORMOTEROL FUMARATE 160; 9; 4.8 UG/1; UG/1; UG/1
2 AEROSOL, METERED RESPIRATORY (INHALATION) 2 TIMES DAILY
Qty: 10.7 G | Refills: 11 | Status: SHIPPED | OUTPATIENT
Start: 2024-09-18

## 2024-09-18 RX ORDER — PREDNISONE 20 MG/1
TABLET ORAL
Qty: 21 TABLET | Refills: 2 | Status: SHIPPED | OUTPATIENT
Start: 2024-09-18

## 2024-09-18 RX ORDER — VARENICLINE TARTRATE 0.5 (11)-1
KIT ORAL
Qty: 1 EACH | Refills: 0 | Status: SHIPPED | OUTPATIENT
Start: 2024-09-18

## 2024-09-18 RX ORDER — BACLOFEN 20 MG/1
20 TABLET ORAL 3 TIMES DAILY
COMMUNITY
Start: 2024-09-13

## 2024-09-18 RX ORDER — GABAPENTIN 600 MG/1
600 TABLET ORAL 3 TIMES DAILY
COMMUNITY

## 2024-09-18 RX ORDER — VARENICLINE TARTRATE 1 MG/1
1 TABLET, FILM COATED ORAL 2 TIMES DAILY
Qty: 60 TABLET | Refills: 4 | Status: SHIPPED | OUTPATIENT
Start: 2024-10-18

## 2024-09-18 RX ORDER — OXYCODONE AND ACETAMINOPHEN 10; 325 MG/1; MG/1
1 TABLET ORAL 4 TIMES DAILY PRN
COMMUNITY
Start: 2024-09-17

## 2024-09-18 NOTE — PROGRESS NOTES
2024    Courtney Reilly  New Patient Consult    Chief Complaint   Patient presents with    Follow-up    COPD       HPI:    2024 pt has had to use prednisone and zithromycin for 3 days on then skips 2 days and feels like needs repeat.      Pt relates pain worsens breathing..        2024-pt took prednisone recently -- mucous sl brown, and sob/temple severe.   Pt not uses trelegy daily   From sticky no done 4 weeks ago-Notify CT of the chest is viewed by direct vision-3 nodules are seen.  Would recommend a PET scan in a month.  Would recommend office follow-up around that time to review.  Notify.  From PET scan done on -Impression:     Decreased size of the spiculated nodules within the right upper lobe with mild emphysematous changes.  There is no FDG activity.  Follow-up CT chest in 6 months is recommended     No abnormal FDG activity        Electronically signed by:Becky Naranjo  Date:                                            2024  Patient Instructions   Lung nodules did not have pet scan cancer type activity -- nodules seen on ct   and not seen on ct  - viewed.      Ct needs follow up in 6 months  to make sure no cancer    Use advair 500 twice daily    Try spriva 1-2 daiy ( stop    dry mouth)-- use dosing to get breathing optimal      Use prednisone more-- inhaled steroid may make more stable.    Use prednisoen up to weekly for next month (if needed) -- advair should keep stable      Use albuterol as much as needed.    May use azithromycin for colored mucous.  Would do culture as mucous remains brown (smoking)      Ct scan and pet scan did not shoe cause for back pains...                2024 pt smoker with chr back pain, son with substance use  recently--     Pt has excess hardware in back from back dz.  Mom and dad both had lung cancer along with uncles and granddad.       Pt had had pneumonia 2/yr, got pneumonia vaccine.    Used bronchitis rx with once yearly or so--  not worse at night..    Still smokes..     Patient Instructions   Ct chest 2010 viewed and lungs looked good with min emphysema -- ct chest at [facility] 1/5/2024 no lung lesion (cannot view films).      You have chronic bronchitis and high risk for lung cancer...    Could measure lung capacity with breathing test -- you should have some degree of copd    Trial trelegy once daily reasonable-- should decrease broncitis.    Use albuterol as needed  if more cough or short breath.  May use prednisone if albuterol not clearing bronchitis.    Azithromycin may be used for infection if needed    You may have had immune weakness.      Ct chest 1/2024 with no worrisome findings     Ct screening would be reasonable for lung cancer as you are high risk.   Computer suggest not covered well-- may be.  Will order standing order for ct yearly for 5 yrs.  May do ct til age 82    You need to stop smokes....  PFSH:  Past Medical History:   Diagnosis Date    Arthritis     Chronic back pain     Encounter for blood transfusion     Fatigue     Fibromyalgia     Full dentures     GERD (gastroesophageal reflux disease)     Herniated disc     Multiple sclerosis     On home O2     3 liters as needed    Ovarian tumor     Radiculopathy     Restless legs syndrome (RLS)     Spasticity     Stroke     twice    TIA (transient ischemic attack)          Past Surgical History:   Procedure Laterality Date    CEREBRAL ANEURYSM REPAIR      HYSTERECTOMY      age 27    KNEE SURGERY  2008    left    pain pump implant      OR REMOVAL OF OVARY/TUBE(S)      ROBOT-ASSISTED LAPAROSCOPIC REPAIR OF VENTRAL HERNIA Left 2/15/2022    Procedure: ROBOTIC REPAIR, HERNIA, INCISIONAL - LEFT FLANK;  Surgeon: Sixto Clark Jr., MD;  [facility]: [facility];  Service: General;  Laterality: Left;    SPINE SURGERY  2006      2 on neck.  2 on lumbar spine    TONSILLECTOMY      WRIST SURGERY  2007    right     Social History     Tobacco Use    Smoking status: Every Day     Current  "packs/day: 0.50     Average packs/day: 0.5 packs/day for 30.0 years (15.0 ttl pk-yrs)     Types: Cigarettes    Smokeless tobacco: Never    Tobacco comments:     .    Occup:   disabled.   , lives alone.     Substance Use Topics    Alcohol use: Yes     Comment: socially    Drug use: No     Family History   Problem Relation Name Age of Onset    Cancer Mother          Lung cancer    Cancer Father          Lung cancer     Review of patient's allergies indicates:   Allergen Reactions    Bee pollens     Mushroom flavor Hives          Review of Systems:  a review of eleven systems covering constitutional, Eye, HEENT, Psych, Respiratory, Cardiac, GI, , Musculoskeletal, Endocrine, Dermatologic was negative except for pertinent findings as listed ABOVE and belo     pertinent positives as above, rest good        Exam:Comprehensive exam done. BP (!) 154/96 (BP Location: Right arm, Patient Position: Sitting, BP Method: Small (Automatic))   Pulse 90   Ht 5' 5" (1.651 m)   Wt 64 kg (141 lb 3.3 oz)   SpO2 (!) 93% Comment: on room air at rest  BMI 23.50 kg/m²   Exam included Vitals as listed, and patient's appearance and affect and alertness and mood, oral exam for yeast and hygiene and pharynx lesions and Mallapatti (M) score, neck with inspection for jvd and masses and thyroid abnormalities and lymph nodes (supraclavicular and infraclavicular nodes and axillary also examined and noted if abn), chest exam included symmetry and effort and fremitus and percussion and auscultation, cardiac exam included rhythm and gallops and murmur and rubs and jvd and edema, abdominal exam for mass and hepatosplenomegaly and tenderness and hernias and bowel sounds, Musculoskeletal exam with muscle tone and posture and mobility/gait and  strength, and skin for rashes and cyanosis and pallor and turgor, extremity for clubbing.  Findings were normal except for pertinent findings listed below:  M1,chest is symmetric, no distress, " normal percussion, normal fremitus and good normal breath sounds           Radiographs (ct chest and cxr) reviewed: view by direct vision      Labs reviewed           PFT will be done and results to be reviewed       Plan:  Clinical impression is apparently straight forward and impression with management as below.     Courtney was seen today for follow-up and copd.    Diagnoses and all orders for this visit:    Nicotine dependence, cigarettes, uncomplicated  -     Complete PFT without bronchodilator; Future  -     varenicline (CHANTIX STARTING MONTH BOX) 0.5 mg (11)- 1 mg (42) tablet; Take one 0.5mg tab by mouth once daily X3 days,then increase to one 0.5mg tab twice daily X4 days,then increase to one 1mg tab twice daily  -     varenicline (CHANTIX) 1 mg Tab; Take 1 tablet (1 mg total) by mouth 2 (two) times daily.    Chronic obstructive pulmonary disease, unspecified COPD type  -     azithromycin (ZITHROMAX) 500 MG tablet; Take 1 tablet (500 mg total) by mouth once daily. One daily for yellow mucous, repeat if needed  -     predniSONE (DELTASONE) 20 MG tablet; Take one daily for 3 days and may repeat for shortness of breath.  -     budesonide-glycopyr-formoterol (BREZTRI AEROSPHERE) 160-9-4.8 mcg/actuation HFAA; Inhale 2 puffs into the lungs 2 (two) times daily.    Asthma-COPD overlap syndrome  -     budesonide-glycopyr-formoterol (BREZTRI AEROSPHERE) 160-9-4.8 mcg/actuation HFAA; Inhale 2 puffs into the lungs 2 (two) times daily.    Severe persistent asthma without complication  -     budesonide-glycopyr-formoterol (BREZTRI AEROSPHERE) 160-9-4.8 mcg/actuation HFAA; Inhale 2 puffs into the lungs 2 (two) times daily.    Eosinophilic asthma    Immune deficiency disorder  -     IgG; Future  -     IGM; Future  -     Humoral Immune Eval (Pneumo Serotypes) With H. Flu; Future    Macrocytosis  -     VITAMIN B12; Future  -     CBC auto differential; Future            Follow up in about 3 months (around 12/18/2024), or if  symptoms worsen or fail to improve.    Discussed with patient above for education the following:      Patient Instructions   Azithromycin response may suggest immune weakness-- would screen immune system-- you had prior pneumonia vaccine around 2014..    Use azithromycin daily  -- stop if side effects, may stop if not effective-- may be effective at 2-3 times weekly.....    Check lung capacity -- if lung capacity very poor more therapy may be reasonable      Eosinophils are high--special shots should stabilize lungs if stop smokes    Red cells are large -- check b12 level...    Would use chantix -- stop if bad effects stomach or bad dreams, etc.,...    Continue oxygen     You should be on inhaled steroids-- use breztri 2 twice daily with spacer -- replaces adviar/sprivia.      Ct chest and pet scan viewed again-- nodules looked smaller and no cancer activity seen-- follow up ct chest December or so needed......     Phone follow up        Evaluation took 53 min

## 2024-09-18 NOTE — PATIENT INSTRUCTIONS
Azithromycin response may suggest immune weakness-- would screen immune system-- you had prior pneumonia vaccine around 2014..    Use azithromycin daily  -- stop if side effects, may stop if not effective-- may be effective at 2-3 times weekly.....    Check lung capacity -- if lung capacity very poor more therapy may be reasonable      Eosinophils are high--special shots should stabilize lungs if stop smokes    Red cells are large -- check b12 level...    Would use chantix -- stop if bad effects stomach or bad dreams, etc.,...    Continue oxygen     You should be on inhaled steroids-- use breztri 2 twice daily with spacer -- replaces adviar/sprivia.      Ct chest and pet scan viewed again-- nodules looked smaller and no cancer activity seen-- follow up ct chest December or so needed......     Phone follow up

## 2024-09-25 ENCOUNTER — HOSPITAL ENCOUNTER (EMERGENCY)
Facility: HOSPITAL | Age: 64
Discharge: HOME | End: 2024-09-25
Attending: EMERGENCY MEDICINE
Payer: MEDICARE

## 2024-09-25 VITALS
TEMPERATURE: 98 F | DIASTOLIC BLOOD PRESSURE: 85 MMHG | HEART RATE: 74 BPM | HEIGHT: 65 IN | OXYGEN SATURATION: 96 % | WEIGHT: 141 LBS | BODY MASS INDEX: 23.49 KG/M2 | SYSTOLIC BLOOD PRESSURE: 191 MMHG | RESPIRATION RATE: 18 BRPM

## 2024-09-25 DIAGNOSIS — M47.812 OSTEOARTHRITIS OF CERVICAL SPINE, UNSPECIFIED SPINAL OSTEOARTHRITIS COMPLICATION STATUS: ICD-10-CM

## 2024-09-25 DIAGNOSIS — I16.0 HYPERTENSIVE URGENCY: ICD-10-CM

## 2024-09-25 DIAGNOSIS — N39.0 URINARY TRACT INFECTION WITHOUT HEMATURIA, SITE UNSPECIFIED: ICD-10-CM

## 2024-09-25 DIAGNOSIS — M54.13 RADICULOPATHY OF CERVICOTHORACIC REGION: Primary | ICD-10-CM

## 2024-09-25 LAB
ALBUMIN SERPL BCP-MCNC: 4.3 G/DL (ref 3.5–5.2)
ALP SERPL-CCNC: 82 U/L (ref 55–135)
ALT SERPL W/O P-5'-P-CCNC: 12 U/L (ref 10–44)
ANION GAP SERPL CALC-SCNC: 12 MMOL/L (ref 8–16)
AST SERPL-CCNC: 15 U/L (ref 10–40)
BACTERIA #/AREA URNS HPF: ABNORMAL /HPF
BASOPHILS # BLD AUTO: 0.07 K/UL (ref 0–0.2)
BASOPHILS NFR BLD: 0.9 % (ref 0–1.9)
BILIRUB SERPL-MCNC: 0.5 MG/DL (ref 0.1–1)
BILIRUB UR QL STRIP: NEGATIVE
BNP SERPL-MCNC: 26 PG/ML (ref 0–99)
BUN SERPL-MCNC: 16 MG/DL (ref 8–23)
CALCIUM SERPL-MCNC: 9.7 MG/DL (ref 8.7–10.5)
CHLORIDE SERPL-SCNC: 108 MMOL/L (ref 95–110)
CLARITY UR: CLEAR
CO2 SERPL-SCNC: 23 MMOL/L (ref 23–29)
COLOR UR: YELLOW
CREAT SERPL-MCNC: 0.8 MG/DL (ref 0.5–1.4)
DIFFERENTIAL METHOD BLD: ABNORMAL
EOSINOPHIL # BLD AUTO: 0.2 K/UL (ref 0–0.5)
EOSINOPHIL NFR BLD: 2.2 % (ref 0–8)
ERYTHROCYTE [DISTWIDTH] IN BLOOD BY AUTOMATED COUNT: 13.5 % (ref 11.5–14.5)
EST. GFR  (NO RACE VARIABLE): >60 ML/MIN/1.73 M^2
GLUCOSE SERPL-MCNC: 108 MG/DL (ref 70–110)
GLUCOSE UR QL STRIP: NEGATIVE
HCT VFR BLD AUTO: 41.6 % (ref 37–48.5)
HGB BLD-MCNC: 13.8 G/DL (ref 12–16)
HGB UR QL STRIP: NEGATIVE
IMM GRANULOCYTES # BLD AUTO: 0.03 K/UL (ref 0–0.04)
IMM GRANULOCYTES NFR BLD AUTO: 0.4 % (ref 0–0.5)
KETONES UR QL STRIP: NEGATIVE
LEUKOCYTE ESTERASE UR QL STRIP: ABNORMAL
LYMPHOCYTES # BLD AUTO: 1.8 K/UL (ref 1–4.8)
LYMPHOCYTES NFR BLD: 24.8 % (ref 18–48)
MAGNESIUM SERPL-MCNC: 2.1 MG/DL (ref 1.6–2.6)
MCH RBC QN AUTO: 32.9 PG (ref 27–31)
MCHC RBC AUTO-ENTMCNC: 33.2 G/DL (ref 32–36)
MCV RBC AUTO: 99 FL (ref 82–98)
MICROSCOPIC COMMENT: ABNORMAL
MONOCYTES # BLD AUTO: 0.6 K/UL (ref 0.3–1)
MONOCYTES NFR BLD: 8.1 % (ref 4–15)
NEUTROPHILS # BLD AUTO: 4.7 K/UL (ref 1.8–7.7)
NEUTROPHILS NFR BLD: 63.6 % (ref 38–73)
NITRITE UR QL STRIP: NEGATIVE
NRBC BLD-RTO: 0 /100 WBC
PH UR STRIP: 7 [PH] (ref 5–8)
PLATELET # BLD AUTO: 390 K/UL (ref 150–450)
PMV BLD AUTO: 8.7 FL (ref 9.2–12.9)
POTASSIUM SERPL-SCNC: 4.5 MMOL/L (ref 3.5–5.1)
PROT SERPL-MCNC: 6.8 G/DL (ref 6–8.4)
PROT UR QL STRIP: NEGATIVE
RBC # BLD AUTO: 4.19 M/UL (ref 4–5.4)
RBC #/AREA URNS HPF: 1 /HPF (ref 0–4)
SODIUM SERPL-SCNC: 143 MMOL/L (ref 136–145)
SP GR UR STRIP: 1.01 (ref 1–1.03)
SQUAMOUS #/AREA URNS HPF: 1 /HPF
TROPONIN I SERPL DL<=0.01 NG/ML-MCNC: <0.006 NG/ML (ref 0–0.03)
URN SPEC COLLECT METH UR: ABNORMAL
UROBILINOGEN UR STRIP-ACNC: NEGATIVE EU/DL
WBC # BLD AUTO: 7.37 K/UL (ref 3.9–12.7)
WBC #/AREA URNS HPF: 28 /HPF (ref 0–5)

## 2024-09-25 PROCEDURE — 83735 ASSAY OF MAGNESIUM: CPT | Performed by: EMERGENCY MEDICINE

## 2024-09-25 PROCEDURE — 84484 ASSAY OF TROPONIN QUANT: CPT | Performed by: EMERGENCY MEDICINE

## 2024-09-25 PROCEDURE — 96375 TX/PRO/DX INJ NEW DRUG ADDON: CPT

## 2024-09-25 PROCEDURE — 93010 ELECTROCARDIOGRAM REPORT: CPT | Mod: ,,, | Performed by: GENERAL PRACTICE

## 2024-09-25 PROCEDURE — 85025 COMPLETE CBC W/AUTO DIFF WBC: CPT | Performed by: EMERGENCY MEDICINE

## 2024-09-25 PROCEDURE — 96361 HYDRATE IV INFUSION ADD-ON: CPT

## 2024-09-25 PROCEDURE — 25500020 PHARM REV CODE 255

## 2024-09-25 PROCEDURE — 80053 COMPREHEN METABOLIC PANEL: CPT | Performed by: EMERGENCY MEDICINE

## 2024-09-25 PROCEDURE — 25000003 PHARM REV CODE 250: Performed by: EMERGENCY MEDICINE

## 2024-09-25 PROCEDURE — 36415 COLL VENOUS BLD VENIPUNCTURE: CPT | Performed by: EMERGENCY MEDICINE

## 2024-09-25 PROCEDURE — 94761 N-INVAS EAR/PLS OXIMETRY MLT: CPT

## 2024-09-25 PROCEDURE — 99285 EMERGENCY DEPT VISIT HI MDM: CPT | Mod: 25

## 2024-09-25 PROCEDURE — 87186 SC STD MICRODIL/AGAR DIL: CPT | Performed by: EMERGENCY MEDICINE

## 2024-09-25 PROCEDURE — 81003 URINALYSIS AUTO W/O SCOPE: CPT | Performed by: EMERGENCY MEDICINE

## 2024-09-25 PROCEDURE — 87086 URINE CULTURE/COLONY COUNT: CPT | Performed by: EMERGENCY MEDICINE

## 2024-09-25 PROCEDURE — 63600175 PHARM REV CODE 636 W HCPCS: Performed by: EMERGENCY MEDICINE

## 2024-09-25 PROCEDURE — 83880 ASSAY OF NATRIURETIC PEPTIDE: CPT | Performed by: EMERGENCY MEDICINE

## 2024-09-25 PROCEDURE — 93005 ELECTROCARDIOGRAM TRACING: CPT

## 2024-09-25 PROCEDURE — 96365 THER/PROPH/DIAG IV INF INIT: CPT

## 2024-09-25 PROCEDURE — 81000 URINALYSIS NONAUTO W/SCOPE: CPT | Mod: 59 | Performed by: EMERGENCY MEDICINE

## 2024-09-25 RX ORDER — NITROFURANTOIN 25; 75 MG/1; MG/1
100 CAPSULE ORAL 2 TIMES DAILY
Qty: 10 CAPSULE | Refills: 0 | Status: SHIPPED | OUTPATIENT
Start: 2024-09-25 | End: 2024-09-30

## 2024-09-25 RX ORDER — MELOXICAM 7.5 MG/1
7.5 TABLET ORAL DAILY
Qty: 7 TABLET | Refills: 0 | Status: SHIPPED | OUTPATIENT
Start: 2024-09-25 | End: 2024-10-02

## 2024-09-25 RX ORDER — CYCLOBENZAPRINE HCL 10 MG
10 TABLET ORAL 3 TIMES DAILY
Qty: 21 TABLET | Refills: 0 | Status: SHIPPED | OUTPATIENT
Start: 2024-09-25 | End: 2024-10-02

## 2024-09-25 RX ORDER — ORPHENADRINE CITRATE 30 MG/ML
60 INJECTION INTRAMUSCULAR; INTRAVENOUS
Status: COMPLETED | OUTPATIENT
Start: 2024-09-25 | End: 2024-09-25

## 2024-09-25 RX ORDER — HYDROMORPHONE HYDROCHLORIDE 1 MG/ML
0.5 INJECTION, SOLUTION INTRAMUSCULAR; INTRAVENOUS; SUBCUTANEOUS
Status: COMPLETED | OUTPATIENT
Start: 2024-09-25 | End: 2024-09-25

## 2024-09-25 RX ORDER — DEXAMETHASONE SODIUM PHOSPHATE 4 MG/ML
4 INJECTION, SOLUTION INTRA-ARTICULAR; INTRALESIONAL; INTRAMUSCULAR; INTRAVENOUS; SOFT TISSUE
Status: COMPLETED | OUTPATIENT
Start: 2024-09-25 | End: 2024-09-25

## 2024-09-25 RX ORDER — ONDANSETRON HYDROCHLORIDE 2 MG/ML
4 INJECTION, SOLUTION INTRAVENOUS ONCE
Status: COMPLETED | OUTPATIENT
Start: 2024-09-25 | End: 2024-09-25

## 2024-09-25 RX ADMIN — HYDROMORPHONE HYDROCHLORIDE 0.5 MG: 1 INJECTION, SOLUTION INTRAMUSCULAR; INTRAVENOUS; SUBCUTANEOUS at 03:09

## 2024-09-25 RX ADMIN — SODIUM CHLORIDE, POTASSIUM CHLORIDE, SODIUM LACTATE AND CALCIUM CHLORIDE 500 ML: 600; 310; 30; 20 INJECTION, SOLUTION INTRAVENOUS at 05:09

## 2024-09-25 RX ADMIN — ORPHENADRINE CITRATE 60 MG: 60 INJECTION INTRAMUSCULAR; INTRAVENOUS at 03:09

## 2024-09-25 RX ADMIN — DEXAMETHASONE SODIUM PHOSPHATE 4 MG: 4 INJECTION INTRA-ARTICULAR; INTRALESIONAL; INTRAMUSCULAR; INTRAVENOUS; SOFT TISSUE at 05:09

## 2024-09-25 RX ADMIN — CEFTRIAXONE 1 G: 1 INJECTION, POWDER, FOR SOLUTION INTRAMUSCULAR; INTRAVENOUS at 05:09

## 2024-09-25 RX ADMIN — IOHEXOL 75 ML: 350 INJECTION, SOLUTION INTRAVENOUS at 06:09

## 2024-09-25 RX ADMIN — ONDANSETRON 4 MG: 2 INJECTION INTRAMUSCULAR; INTRAVENOUS at 03:09

## 2024-09-25 NOTE — ED PROVIDER NOTES
Encounter Date: 9/25/2024       History     Chief Complaint   Patient presents with    Back Pain     Per EMS for upper back/neck pain -- reports chronic pain that worsened after working in yard yesterday     64-year-old female with history of chronic neck and back pain patient with cervical, thoracic radiculopathy, cervical, thoracic degenerative disc disease, fibromyalgia, restless legs, spasticity, gastroesophageal reflux, TIA, hypertension.  Patient presents to the emergency department with complaint neck and upper back pain which has been noted throughout the week.  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  During that time patient states that she had felt weak lightheaded as well.  Also had complaint of headache which she describes as left occipital region.  Was associated with nausea.  Denies vomiting, no chest pain shortness of breath, no abdominal pain, denies any other constitutional symptoms.  Patient decided come to the emergency department today because she was having worsening pain in noted that her blood pressure was elevated in the 180s.  States has been compliant with the medications.  In emergency department patient found with blood pressure 220/100.  Patient states that this is secondary to her pain.  She denies any bowel or bladder incontinence, no weakness to upper were lower extremites.  No fever, no trauma, denies any other constitutional symptoms.  Patient followed by Dr. Rainey neurology on the [facility] and [facility] locations.      Review of patient's allergies indicates:   Allergen Reactions    Bee pollens     Mushroom flavor Hives     Past Medical History:   Diagnosis Date    Arthritis     Chronic back pain     Encounter for blood transfusion     Fatigue     Fibromyalgia     Full dentures     GERD (gastroesophageal reflux disease)     Herniated disc     Multiple sclerosis     On home O2     3 liters as needed     Ovarian tumor     Radiculopathy     Restless legs syndrome (RLS)     Spasticity     Stroke     twice    TIA (transient ischemic attack)      Past Surgical History:   Procedure Laterality Date    CEREBRAL ANEURYSM REPAIR      HYSTERECTOMY      age 27    KNEE SURGERY  2008    left    pain pump implant      MT REMOVAL OF OVARY/TUBE(S)      ROBOT-ASSISTED LAPAROSCOPIC REPAIR OF VENTRAL HERNIA Left 2/15/2022    Procedure: ROBOTIC REPAIR, HERNIA, INCISIONAL - LEFT FLANK;  Surgeon: Sixto Clark Jr., MD;  Location: [facility] OR;  Service: General;  Laterality: Left;    SPINE SURGERY        2 on neck.  2 on lumbar spine    TONSILLECTOMY      WRIST SURGERY  2007    right     Family History   Problem Relation Name Age of Onset    Cancer Mother          Lung cancer    Cancer Father          Lung cancer     Social History     Tobacco Use    Smoking status: Every Day     Current packs/day: 0.50     Average packs/day: 0.5 packs/day for 30.0 years (15.0 ttl pk-yrs)     Types: Cigarettes    Smokeless tobacco: Never    Tobacco comments:     .    Occup:   disabled.   , lives alone.     Substance Use Topics    Alcohol use: Yes     Comment: socially    Drug use: No     Review of Systems   Constitutional:  Negative for fever.   HENT:  Negative for sore throat.         Neck pain   Respiratory:  Negative for chest tightness and shortness of breath.    Cardiovascular:  Negative for chest pain and palpitations.   Gastrointestinal:  Negative for abdominal pain, nausea and vomiting.   Genitourinary:  Negative for dysuria.   Musculoskeletal:  Positive for arthralgias, back pain, myalgias, neck pain and neck stiffness. Negative for gait problem.        Upper thoracic back pain   Skin:  Negative for rash.   Neurological:  Positive for headaches. Negative for dizziness, weakness, light-headedness and numbness.   Hematological:  Does not bruise/bleed easily.       Physical Exam     Initial Vitals   BP Pulse Resp Temp SpO2    09/25/24 1403 09/25/24 1403 09/25/24 1403 09/25/24 1412 09/25/24 1403   (!) 170/99 100 20 97.8 °F (36.6 °C) 95 %      MAP       --                Physical Exam    Nursing note and vitals reviewed.  Constitutional: She appears well-developed and well-nourished. She is not diaphoretic. No distress.   HENT:   Head: Normocephalic and atraumatic.   Nose: Nose normal.   Mouth/Throat: Oropharynx is clear and moist.   Eyes: Conjunctivae and EOM are normal. Pupils are equal, round, and reactive to light.   Neck: Neck supple. No thyromegaly present. No tracheal deviation present.   Positive paraspinal tenderness noted to palpation at C5, C6 region, no step-off, no crepitus noted.   Normal range of motion.  Cardiovascular:  Normal rate, regular rhythm, normal heart sounds and intact distal pulses.     Exam reveals no gallop and no friction rub.       No murmur heard.  Pulmonary/Chest: No stridor. No respiratory distress.   Course bilateral breath sounds no adventitious sounds   Abdominal: Abdomen is soft. Bowel sounds are normal. She exhibits no distension and no mass. There is abdominal tenderness.   Mild diffuse upper abdominal tenderness, no rebound or guarding noted.  No CVA tenderness noted. There is no rebound and no guarding.   Musculoskeletal:         General: No edema. Normal range of motion.      Cervical back: Normal range of motion and neck supple.      Comments: Positive T2-,T4 para spinal tenderness.  No crepitus, no step-offs noted.     Lymphadenopathy:     She has no cervical adenopathy.   Neurological: She is alert and oriented to person, place, and time. She has normal strength and normal reflexes. GCS score is 15. GCS eye subscore is 4. GCS verbal subscore is 5. GCS motor subscore is 6.   Skin: Skin is warm and dry. Capillary refill takes less than 2 seconds.   Psychiatric: She has a normal mood and affect.         ED Course   Procedures  Labs Reviewed   CBC W/ AUTO DIFFERENTIAL - Abnormal       Result  Value    WBC 7.37      RBC 4.19      Hemoglobin 13.8      Hematocrit 41.6      MCV 99 (*)     MCH 32.9 (*)     MCHC 33.2      RDW 13.5      Platelets 390      MPV 8.7 (*)     Immature Granulocytes 0.4      Gran # (ANC) 4.7      Immature Grans (Abs) 0.03      Lymph # 1.8      Mono # 0.6      Eos # 0.2      Baso # 0.07      nRBC 0      Gran % 63.6      Lymph % 24.8      Mono % 8.1      Eosinophil % 2.2      Basophil % 0.9      Differential Method Automated     URINALYSIS, REFLEX TO URINE CULTURE - Abnormal    Specimen UA Urine, Clean Catch      Color, UA Yellow      Appearance, UA Clear      pH, UA 7.0      Specific Gravity, UA 1.010      Protein, UA Negative      Glucose, UA Negative      Ketones, UA Negative      Bilirubin (UA) Negative      Occult Blood UA Negative      Nitrite, UA Negative      Urobilinogen, UA Negative      Leukocytes, UA Trace (*)     Narrative:     Specimen Source->Urine   URINALYSIS MICROSCOPIC - Abnormal    RBC, UA 1      WBC, UA 28 (*)     Bacteria Many (*)     Squam Epithel, UA 1      Microscopic Comment SEE COMMENT      Narrative:     Specimen Source->Urine   CULTURE, URINE   COMPREHENSIVE METABOLIC PANEL    Sodium 143      Potassium 4.5      Chloride 108      CO2 23      Glucose 108      BUN 16      Creatinine 0.8      Calcium 9.7      Total Protein 6.8      Albumin 4.3      Total Bilirubin 0.5      Alkaline Phosphatase 82      AST 15      ALT 12      eGFR >60      Anion Gap 12     TROPONIN I    Troponin I <0.006     B-TYPE NATRIURETIC PEPTIDE    BNP 26     MAGNESIUM    Magnesium 2.1     TROPONIN I          Imaging Results              CT Cervical Spine Without Contrast (Final result)  Result time 09/25/24 17:02:49      Final result by Acosta Mtz MD (09/25/24 17:02:49)                   Impression:      1. No evidence of acute fracture or traumatic malalignment of the cervical spine.  2. Postoperative changes of ACDF at C3-C7 with solid osseous fusion at C3-C6 and attempted fusion at  C6-7.  Minimal new perihardware lucency about the bilateral C7 screws and anterior aspect of the anterior plate, which could indicate some loosening.  No evidence of hardware fracture or loosening elsewhere.      Electronically signed by: Acosta Mtz  Date:    09/25/2024  Time:    17:02               Narrative:    EXAMINATION:  CT CERVICAL SPINE WITHOUT CONTRAST    CLINICAL HISTORY:  Cervical radiculopathy, prior cervical surgery;    TECHNIQUE:  Low dose axial CT images through the cervical spine, with sagittal and coronal reformations.  Contrast was not administered.    COMPARISON:  CT cervical spine 12/08/2021    FINDINGS:  Postoperative changes of anterior cervical discectomy and fusion at C3-C7 again demonstrated.  Solid osseous fusion is noted at C3-C6 with attempted fusion at C6-7.  Minimal new perihardware lucency about the bilateral C7 screws and inferior aspect of the anterior plate, which could indicate some loosening.  No evidence of hardware fracture or loosening elsewhere.    The vertebral bodies are normal in height without evidence of fracture or osseous destructive process.  Incomplete fusion of the posterior C1 arch again noted, a normal developmental variant.  Stable minimal anterolisthesis of C2 on C3 and C3 on C4.  No evidence of acute abnormal subluxation.    Multilevel degenerative changes, most pronounced at C6-7 with uncovertebral joint spurring and facet arthropathy contributing to moderate to severe right neural foraminal stenosis.  No high-grade bony spinal canal or neural foraminal stenosis elsewhere.  Multilevel mild-to-moderate intervertebral disc space narrowing, most pronounced at T3-4.    Limited evaluation of the intraspinal contents demonstrates no hematoma or mass.Paraspinal soft tissues exhibit no acute abnormalities.  Lung apices are grossly clear in demonstrate moderate centrilobular emphysematous changes.                                       CT Head Without Contrast (Final  result)  Result time 09/25/24 16:53:59      Final result by Ronnie Huitron MD (09/25/24 16:53:59)                   Impression:      1. There is no acute abnormality.  There is a stable small left frontal meningioma accounting for hyperdensity in this region.  There is no intracranial hemorrhage, parenchymal mass or acute edema or ischemia.      Electronically signed by: Ronnie Huitron MD  Date:    09/25/2024  Time:    16:53               Narrative:    EXAMINATION:  CT HEAD WITHOUT CONTRAST    CLINICAL HISTORY:  Headache, chronic, new features or increased frequency;    TECHNIQUE:  Routine unenhanced axial images were obtained through the head.  Sagittal and coronal reformatted images were created.  The study is reviewed in bone and soft tissue windows.    COMPARISON:  Head CT dated 12/08/2021    FINDINGS:  Intracranial contents: There is no acute abnormality.  There is a stable extra-axial dural base slightly dense/partially calcified left frontal mass measuring approximately 9 x 10 x 8 mm most consistent with a stable small meningioma.  Brain volume, ventricular size and position are normal for age.  There is only mild nonspecific white matter change which may reflect sequelae of chronic small vessel disease.  There is no abnormal extra-axial fluid collection.  The gray-white interface is preserved without acute infarction.  The basilar cisterns are open.  The cerebellar tonsils are normal position.  Sellar structures are normal.    Extracranial contents, calvarium, soft tissues: The calvarium is normal.  The included paranasal sinuses and mastoid air cells are clear.  Incomplete fusion of the posterior arch of C1 represents normal developmental variation.                                       X-Ray Chest AP Portable (Final result)  Result time 09/25/24 14:49:54      Final result by Wilver Brown MD (09/25/24 14:49:54)                   Impression:      Negative chest.  No significant  change.      Electronically signed by: Wilver Brown MD  Date:    09/25/2024  Time:    14:49               Narrative:    EXAMINATION:  XR CHEST AP PORTABLE    CLINICAL HISTORY:  Chest Pain;    TECHNIQUE:  Single frontal view of the chest was performed.    COMPARISON:  02/15/2022    FINDINGS:  The cardiomediastinal silhouette is within normal limits.  The lungs are well expanded without consolidation or pleural effusion. There has been anterior cervical discectomy and fusion.                                       Medications   lactated ringers bolus 500 mL (500 mLs Intravenous New Bag 9/25/24 1739)   cefTRIAXone (Rocephin) 1 g in D5W 100 mL IVPB (MB+) (1 g Intravenous New Bag 9/25/24 1740)   iohexoL (OMNIPAQUE 350) 350 mg iodine/mL injection (has no administration in time range)   HYDROmorphone injection 0.5 mg (0.5 mg Intravenous Given 9/25/24 1531)   orphenadrine injection 60 mg (60 mg Intravenous Given 9/25/24 1531)   ondansetron injection 4 mg (4 mg Intravenous Given 9/25/24 1531)   dexAMETHasone injection 4 mg (4 mg Intravenous Given 9/25/24 1739)     Medical Decision Making  Amount and/or Complexity of Data Reviewed  Labs: ordered.  Radiology: ordered.    Risk  Prescription drug management.                          Medical Decision Making:   Initial Assessment:   64-year-old female with history of chronic neck and back pain patient with cervical, thoracic radiculopathy, cervical, thoracic degenerative disc disease, fibromyalgia, restless legs, spasticity, gastroesophageal reflux, TIA, hypertension.  Patient presents to the emergency department with complaint neck and upper back pain which has been noted throughout the week.  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  During that time patient states that she had felt weak lightheaded as well.  Also had complaint of headache which she describes as left occipital region.  Was associated  with nausea.  Denies vomiting, no chest pain shortness of breath, no abdominal pain, denies any other constitutional symptoms.  Patient decided come to the emergency department today because she was having worsening pain in noted that her blood pressure was elevated in the 180s.  States has been compliant with the medications.  In emergency department patient found with blood pressure 220/100.  Patient states that this is secondary to her pain.  She denies any bowel or bladder incontinence, no weakness to upper were lower extremites.  No fever, no trauma, denies any other constitutional symptoms.  [place] Rain[place] [facility].    Differential Diagnosis:   Cervical, thoracic radiculopathy, hypertensive urgency, hypertensive emergency, acute on chronic pain exacerbation  Clinical Tests:   Lab Tests: Ordered and Reviewed  Radiological Study: Ordered and Reviewed  Medical Tests: Ordered and Reviewed             Clinical Impression:  Final diagnoses:  [M54.13] Radiculopathy of cervicothoracic region (Primary)  [I16.0] Hypertensive urgency  [M47.812] Osteoarthritis of cervical spine, unspecified spinal osteoarthritis complication status  [N39.0] Urinary tract infection without hematuria, site unspecified                 Zaid Min MD  09/25/24 1800

## 2024-09-26 ENCOUNTER — PATIENT OUTREACH (OUTPATIENT)
Dept: EMERGENCY MEDICINE | Facility: HOSPITAL | Age: 64
End: 2024-09-26

## 2024-09-26 LAB
OHS QRS DURATION: 74 MS
OHS QTC CALCULATION: 421 MS

## 2024-09-27 LAB — BACTERIA UR CULT: ABNORMAL

## 2024-10-04 ENCOUNTER — HOSPITAL ENCOUNTER (EMERGENCY)
Facility: HOSPITAL | Age: 64
Discharge: HOME | End: 2024-10-05
Attending: EMERGENCY MEDICINE
Payer: MEDICARE

## 2024-10-04 DIAGNOSIS — K83.8 COMMON BILE DUCT DILATATION: ICD-10-CM

## 2024-10-04 DIAGNOSIS — R10.30 LOWER ABDOMINAL PAIN: Primary | ICD-10-CM

## 2024-10-04 DIAGNOSIS — K59.00 CONSTIPATION: ICD-10-CM

## 2024-10-04 LAB
ALBUMIN SERPL BCP-MCNC: 4.8 G/DL (ref 3.5–5.2)
ALP SERPL-CCNC: 83 U/L (ref 55–135)
ALT SERPL W/O P-5'-P-CCNC: 9 U/L (ref 10–44)
ANION GAP SERPL CALC-SCNC: 9 MMOL/L (ref 8–16)
AST SERPL-CCNC: 15 U/L (ref 10–40)
BASOPHILS # BLD AUTO: 0.05 K/UL (ref 0–0.2)
BASOPHILS NFR BLD: 0.4 % (ref 0–1.9)
BILIRUB SERPL-MCNC: 0.5 MG/DL (ref 0.1–1)
BILIRUB UR QL STRIP: NEGATIVE
BUN SERPL-MCNC: 13 MG/DL (ref 8–23)
CALCIUM SERPL-MCNC: 9.8 MG/DL (ref 8.7–10.5)
CHLORIDE SERPL-SCNC: 101 MMOL/L (ref 95–110)
CLARITY UR: ABNORMAL
CO2 SERPL-SCNC: 28 MMOL/L (ref 23–29)
COLOR UR: YELLOW
CREAT SERPL-MCNC: 1.5 MG/DL (ref 0.5–1.4)
DIFFERENTIAL METHOD BLD: ABNORMAL
EOSINOPHIL # BLD AUTO: 0.1 K/UL (ref 0–0.5)
EOSINOPHIL NFR BLD: 1 % (ref 0–8)
ERYTHROCYTE [DISTWIDTH] IN BLOOD BY AUTOMATED COUNT: 14 % (ref 11.5–14.5)
EST. GFR  (NO RACE VARIABLE): 39.2 ML/MIN/1.73 M^2
GLUCOSE SERPL-MCNC: 94 MG/DL (ref 70–110)
GLUCOSE UR QL STRIP: NEGATIVE
HCT VFR BLD AUTO: 44.9 % (ref 37–48.5)
HGB BLD-MCNC: 15 G/DL (ref 12–16)
HGB UR QL STRIP: NEGATIVE
IMM GRANULOCYTES # BLD AUTO: 0.02 K/UL (ref 0–0.04)
IMM GRANULOCYTES NFR BLD AUTO: 0.2 % (ref 0–0.5)
KETONES UR QL STRIP: NEGATIVE
LDH SERPL L TO P-CCNC: 0.95 MMOL/L (ref 0.5–2.2)
LEUKOCYTE ESTERASE UR QL STRIP: NEGATIVE
LIPASE SERPL-CCNC: 55 U/L (ref 4–60)
LYMPHOCYTES # BLD AUTO: 2.5 K/UL (ref 1–4.8)
LYMPHOCYTES NFR BLD: 20 % (ref 18–48)
MCH RBC QN AUTO: 32.3 PG (ref 27–31)
MCHC RBC AUTO-ENTMCNC: 33.4 G/DL (ref 32–36)
MCV RBC AUTO: 97 FL (ref 82–98)
MONOCYTES # BLD AUTO: 0.9 K/UL (ref 0.3–1)
MONOCYTES NFR BLD: 7 % (ref 4–15)
NEUTROPHILS # BLD AUTO: 9 K/UL (ref 1.8–7.7)
NEUTROPHILS NFR BLD: 71.4 % (ref 38–73)
NITRITE UR QL STRIP: NEGATIVE
NRBC BLD-RTO: 0 /100 WBC
PH UR STRIP: 8 [PH] (ref 5–8)
PLATELET # BLD AUTO: 402 K/UL (ref 150–450)
PMV BLD AUTO: 8.7 FL (ref 9.2–12.9)
POTASSIUM SERPL-SCNC: 3.7 MMOL/L (ref 3.5–5.1)
PROT SERPL-MCNC: 7.4 G/DL (ref 6–8.4)
PROT UR QL STRIP: NEGATIVE
RBC # BLD AUTO: 4.65 M/UL (ref 4–5.4)
SAMPLE: NORMAL
SODIUM SERPL-SCNC: 138 MMOL/L (ref 136–145)
SP GR UR STRIP: 1.01 (ref 1–1.03)
URN SPEC COLLECT METH UR: ABNORMAL
UROBILINOGEN UR STRIP-ACNC: NEGATIVE EU/DL
WBC # BLD AUTO: 12.65 K/UL (ref 3.9–12.7)

## 2024-10-04 PROCEDURE — 96361 HYDRATE IV INFUSION ADD-ON: CPT

## 2024-10-04 PROCEDURE — 99285 EMERGENCY DEPT VISIT HI MDM: CPT | Mod: 25

## 2024-10-04 PROCEDURE — 81003 URINALYSIS AUTO W/O SCOPE: CPT | Performed by: PHYSICIAN ASSISTANT

## 2024-10-04 PROCEDURE — 25000003 PHARM REV CODE 250

## 2024-10-04 PROCEDURE — 63600175 PHARM REV CODE 636 W HCPCS

## 2024-10-04 PROCEDURE — 80053 COMPREHEN METABOLIC PANEL: CPT | Performed by: PHYSICIAN ASSISTANT

## 2024-10-04 PROCEDURE — 85025 COMPLETE CBC W/AUTO DIFF WBC: CPT | Performed by: PHYSICIAN ASSISTANT

## 2024-10-04 PROCEDURE — 83690 ASSAY OF LIPASE: CPT | Performed by: PHYSICIAN ASSISTANT

## 2024-10-04 RX ORDER — LACTULOSE 10 G/15ML
20 SOLUTION ORAL ONCE
Status: DISCONTINUED | OUTPATIENT
Start: 2024-10-04 | End: 2024-10-04

## 2024-10-04 RX ORDER — PSEUDOEPHEDRINE/ACETAMINOPHEN 30MG-500MG
100 TABLET ORAL
Status: DISCONTINUED | OUTPATIENT
Start: 2024-10-04 | End: 2024-10-04

## 2024-10-04 RX ORDER — ACETAMINOPHEN 500 MG
1000 TABLET ORAL
Status: COMPLETED | OUTPATIENT
Start: 2024-10-04 | End: 2024-10-04

## 2024-10-04 RX ORDER — SYRING-NEEDL,DISP,INSUL,0.3 ML 29 G X1/2"
296 SYRINGE, EMPTY DISPOSABLE MISCELLANEOUS
Status: DISCONTINUED | OUTPATIENT
Start: 2024-10-04 | End: 2024-10-04

## 2024-10-04 RX ADMIN — ACETAMINOPHEN 1000 MG: 500 TABLET, FILM COATED ORAL at 10:10

## 2024-10-04 RX ADMIN — SODIUM CHLORIDE, POTASSIUM CHLORIDE, SODIUM LACTATE AND CALCIUM CHLORIDE 500 ML: 600; 310; 30; 20 INJECTION, SOLUTION INTRAVENOUS at 11:10

## 2024-10-05 VITALS
OXYGEN SATURATION: 96 % | DIASTOLIC BLOOD PRESSURE: 70 MMHG | TEMPERATURE: 98 F | HEART RATE: 82 BPM | SYSTOLIC BLOOD PRESSURE: 129 MMHG | WEIGHT: 140 LBS | RESPIRATION RATE: 14 BRPM | HEIGHT: 65 IN | BODY MASS INDEX: 23.32 KG/M2

## 2024-10-05 PROCEDURE — 96374 THER/PROPH/DIAG INJ IV PUSH: CPT

## 2024-10-05 PROCEDURE — 63600175 PHARM REV CODE 636 W HCPCS

## 2024-10-05 PROCEDURE — 25000003 PHARM REV CODE 250

## 2024-10-05 PROCEDURE — 25500020 PHARM REV CODE 255: Performed by: EMERGENCY MEDICINE

## 2024-10-05 PROCEDURE — 96361 HYDRATE IV INFUSION ADD-ON: CPT

## 2024-10-05 RX ORDER — DICYCLOMINE HYDROCHLORIDE 10 MG/1
20 CAPSULE ORAL
Status: COMPLETED | OUTPATIENT
Start: 2024-10-05 | End: 2024-10-05

## 2024-10-05 RX ORDER — LACTULOSE 10 G/15ML
20 SOLUTION ORAL ONCE
Status: COMPLETED | OUTPATIENT
Start: 2024-10-05 | End: 2024-10-05

## 2024-10-05 RX ORDER — PSEUDOEPHEDRINE/ACETAMINOPHEN 30MG-500MG
100 TABLET ORAL
Status: COMPLETED | OUTPATIENT
Start: 2024-10-05 | End: 2024-10-05

## 2024-10-05 RX ORDER — PANTOPRAZOLE SODIUM 40 MG/10ML
40 INJECTION, POWDER, LYOPHILIZED, FOR SOLUTION INTRAVENOUS
Status: COMPLETED | OUTPATIENT
Start: 2024-10-05 | End: 2024-10-05

## 2024-10-05 RX ORDER — SYRING-NEEDL,DISP,INSUL,0.3 ML 29 G X1/2"
296 SYRINGE, EMPTY DISPOSABLE MISCELLANEOUS
Status: COMPLETED | OUTPATIENT
Start: 2024-10-05 | End: 2024-10-05

## 2024-10-05 RX ADMIN — PANTOPRAZOLE SODIUM 40 MG: 40 INJECTION, POWDER, LYOPHILIZED, FOR SOLUTION INTRAVENOUS at 04:10

## 2024-10-05 RX ADMIN — LACTULOSE 20 G: 20 SOLUTION ORAL at 04:10

## 2024-10-05 RX ADMIN — DICYCLOMINE HYDROCHLORIDE 20 MG: 10 CAPSULE ORAL at 04:10

## 2024-10-05 RX ADMIN — MAGNESIUM CITRATE 296 ML: 1.75 LIQUID ORAL at 04:10

## 2024-10-05 RX ADMIN — Medication 100 ML: at 04:10

## 2024-10-05 RX ADMIN — SODIUM CHLORIDE 500 ML: 9 INJECTION, SOLUTION INTRAVENOUS at 04:10

## 2024-10-05 RX ADMIN — SODIUM CHLORIDE, POTASSIUM CHLORIDE, SODIUM LACTATE AND CALCIUM CHLORIDE 500 ML: 600; 310; 30; 20 INJECTION, SOLUTION INTRAVENOUS at 04:10

## 2024-10-05 RX ADMIN — IOHEXOL 100 ML: 350 INJECTION, SOLUTION INTRAVENOUS at 12:10

## 2024-10-05 NOTE — FIRST PROVIDER EVALUATION
" Emergency Department TeleTriage Encounter Note      CHIEF COMPLAINT    Chief Complaint   Patient presents with    Constipation     Patient states she hasn't had bm for "weeks".     Abdominal Pain       VITAL SIGNS   Initial Vitals   BP Pulse Resp Temp SpO2   10/04/24 1859 10/04/24 1856 10/04/24 1856 10/04/24 1856 10/04/24 1856   103/75 110 15 98.2 °F (36.8 °C) 98 %      MAP       --                   ALLERGIES    Review of patient's allergies indicates:   Allergen Reactions    Bee pollens     Mushroom flavor Hives       PROVIDER TRIAGE NOTE  Patient presents with complaint of abdominal pain with associated constipation.  Reports history of chronic constipation.  Denies urinary symptoms.  Reports nausea with no vomiting.      Phy:   Constitutional: well nourished, well developed, appearing stated age, NAD        Initial orders will be placed and care will be transferred to an alternate provider when patient is roomed for a full evaluation. Any additional orders and the final disposition will be determined by that provider.        ORDERS  Labs Reviewed - No data to display    ED Orders (720h ago, onward)      None              Virtual Visit Note: The provider triage portion of this emergency department evaluation and documentation was performed via [employer], a HIPAA-compliant telemedicine application, in concert with a tele-presenter in the room. A face to face patient evaluation with one of my colleagues will occur once the patient is placed in an emergency department room.      DISCLAIMER: This note was prepared with [employer] voice recognition transcription software. Garbled syntax, mangled pronouns, and other bizarre constructions may be attributed to that software system.    "

## 2024-10-05 NOTE — DISCHARGE INSTRUCTIONS
Please return to the emergency department a develop worsening abdominal pain, nausea vomiting on an able to keep down fluids or liquids, fevers, chills, not feel like her normal self, for any other concerns

## 2024-10-05 NOTE — ED PROVIDER NOTES
"Encounter Date: 10/4/2024       History     Chief Complaint   Patient presents with    Constipation     Patient states she hasn't had bm for "weeks".     Abdominal Pain     HPI    Patient is a 64-year-old female with past medical history of IBS presenting for 2 weeks of constipation.  She reports that she has been feeling unwell for the last 2-3 days and realized that she has not had a bowel movement.  She also reports that she is not passing gas but was unable for how long.  She has tried Dulcolax, MiraLax, 2 enemas for the past 24-48 hours with minimal relief and no bowel movement.  She currently endorses 10/10 constant, dull lower abdominal pain radiation to the back.  Of note patient reports that she was previously seen in the ED for UTI last week and has been on Macrobid.  She currently denies any fevers, chills, chest pain, shortness of breath, nausea vomiting.    Review of patient's allergies indicates:   Allergen Reactions    Bee pollens     Mushroom flavor Hives     Past Medical History:   Diagnosis Date    Arthritis     Chronic back pain     Encounter for blood transfusion     Fatigue     Fibromyalgia     Full dentures     GERD (gastroesophageal reflux disease)     Herniated disc     Multiple sclerosis     On home O2     3 liters as needed    Ovarian tumor     Radiculopathy     Restless legs syndrome (RLS)     Spasticity     Stroke     twice    TIA (transient ischemic attack)      Past Surgical History:   Procedure Laterality Date    CEREBRAL ANEURYSM REPAIR      HYSTERECTOMY      age 27    KNEE SURGERY  2008    left    pain pump implant      MI REMOVAL OF OVARY/TUBE(S)      ROBOT-ASSISTED LAPAROSCOPIC REPAIR OF VENTRAL HERNIA Left 2/15/2022    Procedure: ROBOTIC REPAIR, HERNIA, INCISIONAL - LEFT FLANK;  Surgeon: Sixto Clark Jr., MD;  Location: [facility];  Service: General;  Laterality: Left;    SPINE SURGERY  2006      2 on neck.  2 on lumbar spine    TONSILLECTOMY      WRIST SURGERY  2007    right "     Family History   Problem Relation Name Age of Onset    Cancer Mother          Lung cancer    Cancer Father          Lung cancer     Social History     Tobacco Use    Smoking status: Every Day     Current packs/day: 0.50     Average packs/day: 0.5 packs/day for 30.0 years (15.0 ttl pk-yrs)     Types: Cigarettes    Smokeless tobacco: Never    Tobacco comments:     .    Occup:   disabled.   , lives alone.     Substance Use Topics    Alcohol use: Yes     Comment: socially    Drug use: No     Review of Systems   Constitutional:  Negative for fever.   HENT:  Negative for sore throat.    Respiratory:  Negative for shortness of breath.    Cardiovascular:  Negative for chest pain.   Gastrointestinal:  Positive for abdominal distention, abdominal pain and constipation. Negative for nausea.   Genitourinary:  Negative for dysuria.   Musculoskeletal:  Negative for back pain.   Skin:  Negative for rash.   Neurological:  Negative for weakness.   Hematological:  Does not bruise/bleed easily.       Physical Exam     Initial Vitals   BP Pulse Resp Temp SpO2   10/04/24 1859 10/04/24 1856 10/04/24 1856 10/04/24 1856 10/04/24 1856   103/75 110 15 98.2 °F (36.8 °C) 98 %      MAP       --                Physical Exam    Constitutional: She appears well-developed.   HENT:   Head: Normocephalic.   Eyes: Pupils are equal, round, and reactive to light.   Neck: Neck supple.   Cardiovascular:  Normal rate.           Pulmonary/Chest: Breath sounds normal.   Abdominal:   Lower abdominal pain left greater than right on palpation.  No rebound or guarding.  No CVA tenderness.   Musculoskeletal:      Cervical back: Neck supple.     Neurological: She is alert.   Skin: Skin is warm.         ED Course   Procedures  Labs Reviewed   CBC W/ AUTO DIFFERENTIAL - Abnormal       Result Value    WBC 12.65      RBC 4.65      Hemoglobin 15.0      Hematocrit 44.9      MCV 97      MCH 32.3 (*)     MCHC 33.4      RDW 14.0      Platelets 402      MPV  8.7 (*)     Immature Granulocytes 0.2      Gran # (ANC) 9.0 (*)     Immature Grans (Abs) 0.02      Lymph # 2.5      Mono # 0.9      Eos # 0.1      Baso # 0.05      nRBC 0      Gran % 71.4      Lymph % 20.0      Mono % 7.0      Eosinophil % 1.0      Basophil % 0.4      Differential Method Automated     COMPREHENSIVE METABOLIC PANEL - Abnormal    Sodium 138      Potassium 3.7      Chloride 101      CO2 28      Glucose 94      BUN 13      Creatinine 1.5 (*)     Calcium 9.8      Total Protein 7.4      Albumin 4.8      Total Bilirubin 0.5      Alkaline Phosphatase 83      AST 15      ALT 9 (*)     eGFR 39.2 (*)     Anion Gap 9     URINALYSIS, REFLEX TO URINE CULTURE - Abnormal    Specimen UA Urine, Clean Catch      Color, UA Yellow      Appearance, UA Hazy (*)     pH, UA 8.0      Specific Gravity, UA 1.010      Protein, UA Negative      Glucose, UA Negative      Ketones, UA Negative      Bilirubin (UA) Negative      Occult Blood UA Negative      Nitrite, UA Negative      Urobilinogen, UA Negative      Leukocytes, UA Negative      Narrative:     Specimen Source->Urine   LIPASE    Lipase 55     ISTAT LACTATE    POC Lactate 0.95      Sample VENOUS     POCT LACTATE          Imaging Results               CT Abdomen Pelvis With IV Contrast NO Oral Contrast (Final result)  Result time 10/05/24 00:30:01      Final result by Ruddy Jarrett MD (10/05/24 00:30:01)                   Impression:      Mild-to-moderate prominence of the colon throughout its course with air and stool without inflammatory appearance, as discussed above, may relate to constipation, clinical and historical correlation is needed.    Relative narrowing along the right colon, may represent a transient contraction and does not appear inflammatory however if the patient has not had recent colon screening exam I would recommend follow-up colonoscopy or fluoroscopic barium examination to exclude neoplasm.    Interval development of biliary dilatation, the  common duct measures up to approximately 9.1 mm at the hepatic hilum, minimal intrahepatic biliary dilatation noted.  Clinical and historical correlation otherwise needed.    Additional findings as above.    This report was flagged in Epic as abnormal.      Electronically signed by: Ruddy Jarrett  Date:    10/05/2024  Time:    00:30               Narrative:    EXAMINATION:  CT ABDOMEN PELVIS WITH IV CONTRAST    CLINICAL HISTORY:  Abdominal pain, acute, nonlocalized;concern for small bowel obstruction;    TECHNIQUE:  Low dose axial images, sagittal and coronal reformations were obtained from the lung bases to the pubic symphysis following the IV administration of 100 mL of Omnipaque 350 oral contrast was not utilized.  Single phase postcontrast CT examination of the abdomen and pelvis is submitted.    COMPARISON:  CT examination of the abdomen pelvis September 25, 2024    FINDINGS:  The visualized lung bases demonstrate mild motion artifact, there is atelectatic change.  The stomach demonstrates nonspecific appearance of incomplete distention, mild fluid and air within the gastric lumen.    There is interval development of a biliary dilatation, the common duct measures up to approximately 9 mm at the hepatic hilum, there is minimal intrahepatic biliary prominence noted.  There is mild to moderate gallbladder distention.  There is no evidence for pericholecystic or peripancreatic inflammatory change there is no abnormal pancreatic ductal dilatation.    There is no evidence for acute process of the spleen or adrenal glands.  There is no evidence for ureteral calculus or obstructive uropathy bilaterally.  Small renal hypodensities are again noted, somewhat small for characterization.  The abdominal aorta appears normal in caliber, demonstrates appropriate opacification.  Previously identified wall thickening of the urinary bladder is improved, there is no abnormal urinary bladder wall thickening and no perivesicular  inflammatory change at this time.  The patient is status post hysterectomy.  Prominent vasculature along the left retroperitoneum again noted.    There is no evidence for small bowel obstructive process.  The appendix is identified, it does not appear inflamed.  The cecum is somewhat low lying in the pelvis inferiorly, and lying along the hysterectomy bed between the rectum and the urinary bladder without evidence for inflammatory change.  There is mild-to-moderate prominence of the colon throughout its course extending from the level of the cecum to the rectum with air and stool, and fluid.  There are segments of relative narrowing that appear to represent transient contraction, and do not appear inflammatory.  There is a segment of relative narrowing along the right colon, at the junction of the right colon and hepatic flexure, as seen on axial image 94, this may relate to an area of transient contraction, however appears somewhat prominent, there is no inflammatory change seen however if the patient has not had recent colon screening exam I would recommend fluoroscopic barium examination or colonoscopy to exclude neoplasm.  There is no additional evidence for inflammatory process of the colon, and no additional evidence for obstructive lesion.  There is no evidence for free intraperitoneal air.    The visualized osseous structures demonstrate chronic change.  Chronic changes of the spine including spondylolisthesis, chronic appearing endplate change, vacuum phenomena and postoperative change again noted.                                       X-Ray Abdomen Flat And Erect (Final result)  Result time 10/04/24 21:19:26      Final result by Ruddy Jarrett MD (10/04/24 21:19:26)                   Impression:      Mild-to-moderate prominence of the colon with air and stool, this may relate to the history of constipation.      Electronically signed by: Ruddy Jarrett  Date:    10/04/2024  Time:    21:19                Narrative:    EXAMINATION:  XR ABDOMEN FLAT AND ERECT    CLINICAL HISTORY:  Constipation, unspecified    TECHNIQUE:  Flat and erect AP views of the abdomen were performed.    COMPARISON:  Abdominal radiograph September 2, 2017    FINDINGS:  Abdominal radiographic examination is submitted, erect and supine radiographs are submitted, for radiographs are submitted.  Limited imaging of the lung bases demonstrates atelectatic change.    There is no evidence for free intraperitoneal air.  There is a mild amount of air seen within the stomach, there is no abnormal gastric distention.  There are a few air-filled small bowel loops, without a pattern of abnormal small bowel distention.    There is mild-to-moderate prominence of the colon with air and stool, extending to the level of the rectum.    The osseous structures demonstrate chronic change with postoperative change of the spine with associated hardware noted.                                       Medications   lactated ringers bolus 500 mL (500 mLs Intravenous New Bag 10/5/24 0408)   acetaminophen tablet 1,000 mg (1,000 mg Oral Given 10/4/24 2234)   lactated ringers bolus 500 mL (0 mLs Intravenous Stopped 10/5/24 0018)   iohexoL (OMNIPAQUE 350) injection 100 mL (100 mLs Intravenous Given 10/5/24 0001)   lactulose 20 gram/30 mL solution Soln 20 g (20 g Oral Given 10/5/24 0408)   glycerin 99.5% topical solution 100 mL (100 mLs Rectal Given 10/5/24 0408)     And   magnesium citrate solution 296 mL (296 mLs Rectal Given 10/5/24 0407)     And   sodium chloride 0.9% bolus 500 mL 500 mL (0 mLs Rectal Stopped 10/5/24 0415)   dicyclomine capsule 20 mg (20 mg Oral Given 10/5/24 0408)   pantoprazole injection 40 mg (40 mg Intravenous Given 10/5/24 0408)     Medical Decision Making  [facility]  Patient is a 64 y.o. female past medical history of IBS presenting for 2 weeks of constipation.  Tolerating p.o. but not passing gas and has not had bowel movement despite laxative and  "enemas.    Initial Vitals:  BP: 103/75  Pulse: 110  Temp: 98.2 °F (36.8 °C)  Resp: 15  Height: 5' 5" (165.1 cm)  Weight: 63.5 kg (140 lb)  BMI (Calculated): 23.3  SpO2: 98 %    Differential Diagnosis: I considered SBO, constipation, diverticulitis, diverticulosis, infectious process, other diagnoses    Update:  Labs on my independent interpretation shows CBC within acceptable range.  CMP with [facility] creatinine 1.5 (baseline 1).  Lipase 55.  UA without evidence of infection.    CT abdomen and pelvis showed constipation in the colon, transient non inflammatory contraction in the right colon, and common bile duct dilation proximally 9.1 mm.  No SBO.  Recommend colon screening.  When I discussed findings with patient she reports that she had a colonoscopy performed 2 months ago which she states was normal.      Was given lactulose and enema and subsequently had a bowel movement.    Patient to continue taking laxatives at home and close follow up with Gastroenterology.    See ED course for updates. Patients seen and discussed with attending physician. Attestation to follow.    Sourav Hirsch MD  [facility]             ED Course as of 10/05/24 0441   Fri Oct 04, 2024   2135 X-Ray Abdomen Flat And Erect  Stool burden without evidence of SBO [AH]      ED Course User Index  [AH] Sourav Hirsch MD                           Clinical Impression:  Final diagnoses:  [K59.00] Constipation  [R10.30] Lower abdominal pain (Primary)  [K83.8] Common bile duct dilatation                 Sourav Hirsch MD  Resident  10/05/24 0442    "

## 2024-10-07 ENCOUNTER — TELEPHONE (OUTPATIENT)
Dept: CARDIOLOGY | Facility: CLINIC | Age: 64
End: 2024-10-07
Payer: MEDICARE

## 2024-10-07 NOTE — TELEPHONE ENCOUNTER
----- Message from Elliott Ellington sent at 10/4/2024 10:14 AM CDT -----  New pt, not with fadi  ----- Message -----  From: Anoop Jacobs  Sent: 10/4/2024   9:08 AM CDT  To: [facility] Staff    Good morning,     The pt listed above is being referred from [facility] to Dr. Aponte for (Chest pain, unspecified type/Heart palpitations/Labile blood pressure). The external referral is transcribed into the chart. Please advise or contact pt to schedule appt at your earliest convenience.     Thank You,     Anoop Jacobs  [facility]

## 2024-10-09 ENCOUNTER — TELEPHONE (OUTPATIENT)
Dept: GASTROENTEROLOGY | Facility: CLINIC | Age: 64
End: 2024-10-09
Payer: MEDICARE

## 2024-11-11 DIAGNOSIS — K43.9 VENTRAL HERNIA WITHOUT OBSTRUCTION OR GANGRENE: Primary | ICD-10-CM

## 2024-11-18 ENCOUNTER — HOSPITAL ENCOUNTER (OUTPATIENT)
Dept: RADIOLOGY | Facility: HOSPITAL | Age: 64
Discharge: HOME | End: 2024-11-18
Attending: PSYCHIATRY & NEUROLOGY
Payer: MEDICARE

## 2024-11-18 DIAGNOSIS — K43.9 VENTRAL HERNIA WITHOUT OBSTRUCTION OR GANGRENE: ICD-10-CM

## 2024-11-18 PROCEDURE — 76705 ECHO EXAM OF ABDOMEN: CPT | Mod: TC,PO

## 2024-11-18 PROCEDURE — 76705 ECHO EXAM OF ABDOMEN: CPT | Mod: 26,,, | Performed by: RADIOLOGY

## 2024-12-13 ENCOUNTER — TELEPHONE (OUTPATIENT)
Dept: PULMONOLOGY | Facility: CLINIC | Age: 64
End: 2024-12-13
Payer: MEDICARE

## 2024-12-13 NOTE — TELEPHONE ENCOUNTER
Sent message back, pt was seen in September . Coming back in 3m f/u with CT   ----- Message from Tech Saniane sent at 12/12/2024  3:04 PM CST -----  Regarding: Follow up imaging  Good afternoon,     I'm trying to find out if this patient needs a 6 month follow up CT scan of her chest? She had one scheduled for December, then canceled it and it looks like Dr. Whitehead's note states follow up in 6 months or so needed. Should I try to get the patient back on the schedule for a 6 month follow up or push it out?     Thank you,    [place], CT, Lung Screen Coordinator

## 2024-12-16 ENCOUNTER — PATIENT OUTREACH (OUTPATIENT)
Dept: EMERGENCY MEDICINE | Facility: HOSPITAL | Age: 64
End: 2024-12-16

## 2025-01-10 ENCOUNTER — PATIENT OUTREACH (OUTPATIENT)
Dept: EMERGENCY MEDICINE | Facility: HOSPITAL | Age: 65
End: 2025-01-10

## 2025-03-26 DIAGNOSIS — R91.1 COIN LESION: Primary | ICD-10-CM

## 2025-03-27 DIAGNOSIS — Z12.31 ENCOUNTER FOR SCREENING MAMMOGRAM FOR MALIGNANT NEOPLASM OF BREAST: Primary | ICD-10-CM

## 2025-04-01 ENCOUNTER — OFFICE VISIT (OUTPATIENT)
Dept: ORTHOPEDICS | Facility: CLINIC | Age: 65
End: 2025-04-01
Payer: MEDICARE

## 2025-04-01 ENCOUNTER — PATIENT MESSAGE (OUTPATIENT)
Dept: ORTHOPEDICS | Facility: CLINIC | Age: 65
End: 2025-04-01
Payer: MEDICARE

## 2025-04-01 VITALS
DIASTOLIC BLOOD PRESSURE: 92 MMHG | WEIGHT: 140 LBS | SYSTOLIC BLOOD PRESSURE: 152 MMHG | BODY MASS INDEX: 23.32 KG/M2 | HEIGHT: 65 IN

## 2025-04-01 DIAGNOSIS — M17.12 PRIMARY OSTEOARTHRITIS OF LEFT KNEE: Primary | ICD-10-CM

## 2025-04-01 PROCEDURE — 1160F RVW MEDS BY RX/DR IN RCRD: CPT | Mod: HCNC,CPTII,S$GLB, | Performed by: ORTHOPAEDIC SURGERY

## 2025-04-01 PROCEDURE — 1159F MED LIST DOCD IN RCRD: CPT | Mod: HCNC,CPTII,S$GLB, | Performed by: ORTHOPAEDIC SURGERY

## 2025-04-01 PROCEDURE — 3008F BODY MASS INDEX DOCD: CPT | Mod: HCNC,CPTII,S$GLB, | Performed by: ORTHOPAEDIC SURGERY

## 2025-04-01 PROCEDURE — 3080F DIAST BP >= 90 MM HG: CPT | Mod: HCNC,CPTII,S$GLB, | Performed by: ORTHOPAEDIC SURGERY

## 2025-04-01 PROCEDURE — 3077F SYST BP >= 140 MM HG: CPT | Mod: HCNC,CPTII,S$GLB, | Performed by: ORTHOPAEDIC SURGERY

## 2025-04-01 PROCEDURE — 99213 OFFICE O/P EST LOW 20 MIN: CPT | Mod: HCNC,S$GLB,, | Performed by: ORTHOPAEDIC SURGERY

## 2025-04-01 PROCEDURE — 99999 PR PBB SHADOW E&M-EST. PATIENT-LVL IV: CPT | Mod: PBBFAC,HCNC,, | Performed by: ORTHOPAEDIC SURGERY

## 2025-04-01 PROCEDURE — 4010F ACE/ARB THERAPY RXD/TAKEN: CPT | Mod: HCNC,CPTII,S$GLB, | Performed by: ORTHOPAEDIC SURGERY

## 2025-04-01 RX ORDER — TIZANIDINE 4 MG/1
4 TABLET ORAL EVERY 8 HOURS
COMMUNITY
Start: 2025-03-19

## 2025-04-01 RX ORDER — PROPRANOLOL HYDROCHLORIDE 80 MG/1
80 CAPSULE, EXTENDED RELEASE ORAL
COMMUNITY
Start: 2024-11-08

## 2025-04-01 NOTE — PROGRESS NOTES
[facility]    Subjective:     Chief Complaint:   Chief Complaint   Patient presents with    Left Knee - Pain     Wants to reschedule surgery, pain is worse        Past Medical History:   Diagnosis Date    Arthritis     Chronic back pain     Encounter for blood transfusion     Fatigue     Fibromyalgia     Full dentures     GERD (gastroesophageal reflux disease)     Herniated disc     Multiple sclerosis     On home O2     3 liters as needed    Ovarian tumor     Radiculopathy     Restless legs syndrome (RLS)     Spasticity     Stroke     twice    TIA (transient ischemic attack)        Past Surgical History:   Procedure Laterality Date    CEREBRAL ANEURYSM REPAIR      HYSTERECTOMY      age 27    KNEE SURGERY  2008    left    pain pump implant      DE REMOVAL OF OVARY/TUBE(S)      ROBOT-ASSISTED LAPAROSCOPIC REPAIR OF VENTRAL HERNIA Left 2/15/2022    Procedure: ROBOTIC REPAIR, HERNIA, INCISIONAL - LEFT FLANK;  Surgeon: Sixto Clark Jr., MD;  Location: [facility];  Service: General;  Laterality: Left;    SPINE SURGERY  2006      2 on neck.  2 on lumbar spine    TONSILLECTOMY      WRIST SURGERY  2007    right       Current Outpatient Medications   Medication Sig    albuterol (PROVENTIL/VENTOLIN HFA) 90 mcg/actuation inhaler Inhale 2 puffs into the lungs every 4 (four) hours as needed for Wheezing or Shortness of Breath. 2 puffs every 4 hours as needed for cough, wheeze, or shortness of breath    aspirin (ECOTRIN) 81 MG EC tablet Take 1 tablet (81 mg total) by mouth once daily.    celecoxib (CELEBREX) 200 MG capsule Take 1 capsule (200 mg total) by mouth once daily.    dantrolene (DANTRIUM) 50 MG Cap Take 50 mg by mouth 3 (three) times daily. Only takes at night    EPINEPHrine (EPIPEN 2-MELANIE) 0.3 mg/0.3 mL AtIn Inject 0.3 mLs (0.3 mg total) into the muscle once. for 1 dose    irbesartan (AVAPRO) 150 MG tablet Take 150 mg by mouth every evening.    melatonin 5 mg Cap Take 1 capsule by mouth every evening.     multivitamin with minerals tablet Take 1 tablet by mouth once daily.    omeprazole (PRILOSEC) 20 MG capsule     oxyCODONE-acetaminophen (PERCOCET)  mg per tablet Take 1 tablet by mouth 4 (four) times daily as needed.    OXYGEN-AIR DELIVERY SYSTEMS MISC 3 L by Misc.(Non-Drug; Combo Route) route as needed.    predniSONE (DELTASONE) 20 MG tablet Take one daily for 3 days and may repeat for shortness of breath.    propranoloL (INDERAL LA) 80 MG 24 hr capsule Take 80 mg by mouth.    QUEtiapine (SEROQUEL) 25 MG Tab take 1 - 2 tablets BY MOUTH ONCE DAILY IN THE EVENING AS NEEDED    rosuvastatin (CRESTOR) 5 MG tablet Take 5 mg by mouth once daily.    solifenacin (VESICARE) 5 MG tablet Take 10 mg by mouth once daily.    sumatriptan (IMITREX) 100 MG tablet sumatriptan 100 mg tablet   TAKE ONE TABLET BY MOUTH AS NEEDED FOR MIGRAINE, MAY REPEAT in ONE HOUR AS NEEDED FOR HEADACHE max TWO PER DAY    tiZANidine (ZANAFLEX) 4 MG tablet Take 4 mg by mouth every 8 (eight) hours.    vitamin E 400 UNIT capsule Take 400 Units by mouth once daily.    zolpidem (AMBIEN) 10 mg Tab Take 5 mg by mouth nightly as needed.    azithromycin (ZITHROMAX) 500 MG tablet Take 1 tablet (500 mg total) by mouth once daily. One daily for yellow mucous, repeat if needed (Patient not taking: Reported on 4/1/2025)    baclofen (LIORESAL) 20 MG tablet Take 20 mg by mouth 3 (three) times daily. (Patient not taking: Reported on 4/1/2025)    budesonide-glycopyr-formoterol (BREZTRI AEROSPHERE) 160-9-4.8 mcg/actuation HFAA Inhale 2 puffs into the lungs 2 (two) times daily. (Patient not taking: Reported on 4/1/2025)    gabapentin (NEURONTIN) 600 MG tablet Take 600 mg by mouth 3 (three) times daily. (Patient not taking: Reported on 4/1/2025)    hydrocortisone 1 % cream Apply topically 2 (two) times daily. (Patient not taking: Reported on 4/1/2025)    varenicline (CHANTIX STARTING MONTH BOX) 0.5 mg (11)- 1 mg (42) tablet Take one 0.5mg tab by mouth once daily X3  days,then increase to one 0.5mg tab twice daily X4 days,then increase to one 1mg tab twice daily (Patient not taking: Reported on 4/1/2025)    varenicline (CHANTIX) 1 mg Tab Take 1 tablet (1 mg total) by mouth 2 (two) times daily. (Patient not taking: Reported on 4/1/2025)     No current facility-administered medications for this visit.       Review of patient's allergies indicates:   Allergen Reactions    Bee pollens     Mushroom flavor Hives       Family History   Problem Relation Name Age of Onset    Cancer Mother          Lung cancer    Cancer Father          Lung cancer       Social History[1]    History of present illness:  Ms. Valdez comes in today for follow-up for her left knee osteoarthritis.  She was originally scheduled for left total knee arthroplasty about 8 months ago with a pad to cancel secondary to other family obligations.  Unfortunately she continues to complain left knee pain.  It is worse with weight-bearing activities.  It bothers her on a daily basis.  It has caused her to fall on multiple occasions.  It interferes with some of her ADLs.  She comes in today to reschedule her surgery.    Review of Systems:    Constitution: Negative for chills, fever, and sweats.  Negative for unexplained weight loss.    HENT:  Negative for headaches and blurry vision.    Cardiovascular:Negative for chest pain or irregular heart beat. Negative for hypertension.    Respiratory:  Negative for cough and shortness of breath.    Gastrointestinal: Negative for abdominal pain, heartburn, melena, nausea, and vomitting.    Genitourinary:  Negative bladder incontinence and dysuria.    Musculoskeletal:  See HPI for details.     Neurological: Negative for numbness.    Psychiatric/Behavioral: Negative for depression.  The patient is not nervous/anxious.      Endocrine: Negative for polyuria    Hematologic/Lymphatic: Negative for bleeding problem.  Does not bruise/bleed easily.    Skin: Negative for poor would healing and  rash    Objective:      Physical Examination:    Vital Signs:    Vitals:    04/01/25 1140   BP: (!) 152/92       Body mass index is 23.3 kg/m².    This a well-developed, well nourished patient in no acute distress.  They are alert and oriented and cooperative to examination.        Left knee exam: Skin to left knee clean dry and intact.  No erythema or ecchymosis.  No signs or symptoms of infection.  Neurovascularly intact throughout the left lower extremity.  Negative Homans on the left.  Left knee range of motion 0-120 degrees.  The knee is stable to varus and valgus stresses.  She has crepitus with range of motioning.  She has an antalgic gait with weight-bearing.  1+ effusion.      Pertinent New Results:    XRAY Report / Interpretation:   No new radiographs taken on today's clinic visit.    Assessment/Plan:      1. Left knee osteoarthritis proven on radiographs and MRI.      Risks and benefits of proceeding with left total knee arthroplasty were discussed with her today.  She is familiar with these as we had discussed these in the past when she was previously scheduled.  We discussed the outpatient nature of the procedure.  We discussed the use of the [employer] robot system.  All of her questions were answered.  She understood and wished to proceed.  Surgical consents were obtained today.    Risks of the procedure were reviewed with the patient.  This includes, but is not limited to:  infection, bleeding, pain, swelling, decreased range of motion, nerve injury/damage, numbness, leg length discrepancy, wound dehiscence, hardware failure, deep vein thrombosis, pulmonary embolism, reflex sympathetic dystrophy, and possible need for further surgery.    The mobility limitation cannot be sufficiently resolved by the use of a cane. Patient's functional mobility deficit can be sufficiently resolved with the use of a (Rolling Walker or Walker). Patient's mobility limitation significantly impairs their ability to participate  "in one or more activities of daily living. The use of a (RW or Walker) will significantly improve the patient's ability to participate in MRADLS and the patient will use it on a regular basis in the home.    Herminio Sandhu, Physician Assistant, served in the capacity as a "scribe" for this patient encounter.  A "face-to-face" encounter occurred with Dr. Ra Franklin on this date.  The treatment plan and medical decision-making is outlined above. Patient was seen and examined with a chaperone.       This note was created using Dragon voice recognition software that occasionally misinterpreted phrases or words.               [1]   Social History  Socioeconomic History    Marital status:    Tobacco Use    Smoking status: Every Day     Current packs/day: 0.50     Average packs/day: 0.5 packs/day for 30.0 years (15.0 ttl pk-yrs)     Types: Cigarettes    Smokeless tobacco: Never    Tobacco comments:     .    Occup:   disabled.   , lives alone.     Substance and Sexual Activity    Alcohol use: Yes     Comment: socially    Drug use: No    Sexual activity: Never     Partners: Male     Social Drivers of Health     Financial Resource Strain: Low Risk  (2024)    Overall Financial Resource Strain (CARDIA)     Difficulty of Paying Living Expenses: Not very hard   Food Insecurity: No Food Insecurity (2024)    Hunger Vital Sign     Worried About Running Out of Food in the Last Year: Never true     Ran Out of Food in the Last Year: Never true   Transportation Needs: Unmet Transportation Needs (2024)    TRANSPORTATION NEEDS     Transportation : Yes, it has kept me from medical appointments or from getting my medications.   Physical Activity: Insufficiently Active (5/15/2024)    Exercise Vital Sign     Days of Exercise per Week: 1 day     Minutes of Exercise per Session: 10 min   Stress: No Stress Concern Present (2024)    [facility] - Occupational Stress " Questionnaire     Feeling of Stress : Only a little   Housing Stability: Unknown (9/27/2024)    Housing Stability Vital Sign     Unable to Pay for Housing in the Last Year: No     Homeless in the Last Year: No

## 2025-04-03 DIAGNOSIS — M17.12 PRIMARY OSTEOARTHRITIS OF LEFT KNEE: Primary | ICD-10-CM

## 2025-04-03 DIAGNOSIS — Z01.818 PRE-OP TESTING: ICD-10-CM

## 2025-04-03 RX ORDER — CEFAZOLIN SODIUM 2 G/50ML
2 SOLUTION INTRAVENOUS
OUTPATIENT
Start: 2025-04-03

## 2025-04-04 ENCOUNTER — HOSPITAL ENCOUNTER (OUTPATIENT)
Dept: RADIOLOGY | Facility: HOSPITAL | Age: 65
Discharge: HOME | End: 2025-04-04
Attending: EMERGENCY MEDICINE
Payer: MEDICARE

## 2025-04-04 DIAGNOSIS — Z12.31 ENCOUNTER FOR SCREENING MAMMOGRAM FOR MALIGNANT NEOPLASM OF BREAST: ICD-10-CM

## 2025-04-04 PROCEDURE — 77063 BREAST TOMOSYNTHESIS BI: CPT | Mod: TC

## 2025-04-04 PROCEDURE — 77063 BREAST TOMOSYNTHESIS BI: CPT | Mod: 26,,, | Performed by: RADIOLOGY

## 2025-04-04 PROCEDURE — 77067 SCR MAMMO BI INCL CAD: CPT | Mod: 26,,, | Performed by: RADIOLOGY

## 2025-04-17 ENCOUNTER — HOSPITAL ENCOUNTER (OUTPATIENT)
Dept: RADIOLOGY | Facility: HOSPITAL | Age: 65
Discharge: HOME | End: 2025-04-17
Attending: EMERGENCY MEDICINE
Payer: MEDICARE

## 2025-04-17 DIAGNOSIS — R91.1 COIN LESION: ICD-10-CM

## 2025-04-17 LAB
CREAT SERPL-MCNC: 1 MG/DL (ref 0.5–1.4)
SAMPLE: NORMAL

## 2025-04-17 PROCEDURE — 25500020 PHARM REV CODE 255

## 2025-04-17 PROCEDURE — 71260 CT THORAX DX C+: CPT | Mod: TC

## 2025-04-17 PROCEDURE — 71260 CT THORAX DX C+: CPT | Mod: 26,,, | Performed by: RADIOLOGY

## 2025-04-17 RX ADMIN — IOHEXOL 75 ML: 350 INJECTION, SOLUTION INTRAVENOUS at 12:04

## 2025-04-21 ENCOUNTER — HOSPITAL ENCOUNTER (OUTPATIENT)
Dept: PREADMISSION TESTING | Facility: HOSPITAL | Age: 65
Discharge: HOME | End: 2025-04-21
Attending: ORTHOPAEDIC SURGERY
Payer: MEDICARE

## 2025-04-21 ENCOUNTER — HOSPITAL ENCOUNTER (OUTPATIENT)
Dept: RADIOLOGY | Facility: HOSPITAL | Age: 65
Discharge: HOME | End: 2025-04-21
Attending: ORTHOPAEDIC SURGERY
Payer: MEDICARE

## 2025-04-21 VITALS — HEIGHT: 65 IN | BODY MASS INDEX: 23.66 KG/M2 | WEIGHT: 142 LBS

## 2025-04-21 DIAGNOSIS — M17.12 PRIMARY OSTEOARTHRITIS OF LEFT KNEE: ICD-10-CM

## 2025-04-21 DIAGNOSIS — M17.12 PRIMARY OSTEOARTHRITIS OF LEFT KNEE: Primary | ICD-10-CM

## 2025-04-21 DIAGNOSIS — Z01.818 PRE-OP TESTING: ICD-10-CM

## 2025-04-21 LAB
ABSOLUTE EOSINOPHIL (SMH): 0.04 K/UL
ABSOLUTE MONOCYTE (SMH): 0.57 K/UL (ref 0.3–1)
ABSOLUTE NEUTROPHIL COUNT (SMH): 3.7 K/UL (ref 1.8–7.7)
ALBUMIN SERPL-MCNC: 3.8 G/DL (ref 3.5–5.2)
ALP SERPL-CCNC: 79 UNIT/L (ref 40–150)
ALT SERPL-CCNC: 13 UNIT/L (ref 10–44)
ANION GAP (SMH): 10 MMOL/L (ref 8–16)
AST SERPL-CCNC: 21 UNIT/L (ref 11–45)
BASOPHILS # BLD AUTO: 0.02 K/UL
BASOPHILS NFR BLD AUTO: 0.3 %
BILIRUB SERPL-MCNC: 0.2 MG/DL (ref 0.1–1)
BUN SERPL-MCNC: 16 MG/DL (ref 8–23)
CALCIUM SERPL-MCNC: 8.8 MG/DL (ref 8.7–10.5)
CHLORIDE SERPL-SCNC: 106 MMOL/L (ref 95–110)
CO2 SERPL-SCNC: 24 MMOL/L (ref 23–29)
CREAT SERPL-MCNC: 0.9 MG/DL (ref 0.5–1.4)
ERYTHROCYTE [DISTWIDTH] IN BLOOD BY AUTOMATED COUNT: 14 % (ref 11.5–14.5)
GFR SERPLBLD CREATININE-BSD FMLA CKD-EPI: >60 ML/MIN/1.73/M2
GLUCOSE SERPL-MCNC: 91 MG/DL (ref 70–110)
HCT VFR BLD AUTO: 39 % (ref 37–48.5)
HGB BLD-MCNC: 12.6 GM/DL (ref 12–16)
IMM GRANULOCYTES # BLD AUTO: 0.01 K/UL (ref 0–0.04)
IMM GRANULOCYTES NFR BLD AUTO: 0.2 % (ref 0–0.5)
LYMPHOCYTES # BLD AUTO: 2.24 K/UL (ref 1–4.8)
MCH RBC QN AUTO: 31.5 PG (ref 27–31)
MCHC RBC AUTO-ENTMCNC: 32.3 G/DL (ref 32–36)
MCV RBC AUTO: 98 FL (ref 82–98)
NUCLEATED RBC (/100WBC) (SMH): 0 /100 WBC
PLATELET # BLD AUTO: 328 K/UL (ref 150–450)
PMV BLD AUTO: 8.5 FL (ref 9.2–12.9)
POTASSIUM SERPL-SCNC: 4 MMOL/L (ref 3.5–5.1)
PROT SERPL-MCNC: 6.3 GM/DL (ref 6–8.4)
RBC # BLD AUTO: 4 M/UL (ref 4–5.4)
RELATIVE EOSINOPHIL (SMH): 0.6 % (ref 0–8)
RELATIVE LYMPHOCYTE (SMH): 33.9 % (ref 18–48)
RELATIVE MONOCYTE (SMH): 8.6 % (ref 4–15)
RELATIVE NEUTROPHIL (SMH): 56.4 % (ref 38–73)
SODIUM SERPL-SCNC: 140 MMOL/L (ref 136–145)
WBC # BLD AUTO: 6.61 K/UL (ref 3.9–12.7)

## 2025-04-21 PROCEDURE — 71046 X-RAY EXAM CHEST 2 VIEWS: CPT | Mod: 26,,, | Performed by: RADIOLOGY

## 2025-04-21 PROCEDURE — 71046 X-RAY EXAM CHEST 2 VIEWS: CPT | Mod: TC

## 2025-04-21 PROCEDURE — 36415 COLL VENOUS BLD VENIPUNCTURE: CPT | Performed by: ORTHOPAEDIC SURGERY

## 2025-04-21 PROCEDURE — 73700 CT LOWER EXTREMITY W/O DYE: CPT | Mod: TC,LT

## 2025-04-21 PROCEDURE — 85025 COMPLETE CBC W/AUTO DIFF WBC: CPT | Performed by: ORTHOPAEDIC SURGERY

## 2025-04-21 PROCEDURE — 84450 TRANSFERASE (AST) (SGOT): CPT | Performed by: ORTHOPAEDIC SURGERY

## 2025-04-21 PROCEDURE — 73700 CT LOWER EXTREMITY W/O DYE: CPT | Mod: 26,LT,, | Performed by: RADIOLOGY

## 2025-04-21 PROCEDURE — 93010 ELECTROCARDIOGRAM REPORT: CPT | Mod: ,,, | Performed by: GENERAL PRACTICE

## 2025-04-21 PROCEDURE — 93005 ELECTROCARDIOGRAM TRACING: CPT

## 2025-04-21 RX ORDER — SERTRALINE HYDROCHLORIDE 50 MG/1
1 TABLET, FILM COATED ORAL DAILY
COMMUNITY
Start: 2024-10-11

## 2025-04-21 RX ORDER — PRAMIPEXOLE 1.5 MG/1
TABLET, EXTENDED RELEASE ORAL
COMMUNITY
Start: 2025-02-14

## 2025-04-21 NOTE — PRE ADMISSION SCREENING
"               CJR Risk Assessment Scale    Patient Name: Courtney Reilly  YOB: 1960  MRN: [id number]            RIsk Factor Measure Recommendation Patient Data Scale/Score   BMI >40 Reconsider surgery, weight loss   Estimated body mass index is 23.63 kg/m² as calculated from the following:    Height as of this encounter: 5' 5" (1.651 m).    Weight as of this encounter: 64.4 kg (142 lb).   [x] 0 = 1 - 24.9  [] 1 = 25-29.9  [] 2 = 30-34.9  [] 3 = 35-39.9  [] 4 = 40-44.9  [] 5 = 45-99.9   Hemoglobin AIC (if applicable) >9 Delay surgery until DM under control  Refer for:  Nutrition Therapy  Exercise   Medication    Lab Results   Component Value Date    HGBA1C 5.1 09/28/2023       Lab Results   Component Value Date    GLU 94 10/04/2024      [x] 0 = 4.0-5.6  [] 1 = 5.7-6.4  [] 2 = 6.5-6.9  [] 3 = 7.0-7.9  [] 4 = 8.0-8.9  [] 5 = 9.0-12   Hemoglobin (Anemia) <9 Delay surgery   Correct anemia Lab Results   Component Value Date    HGB 12.6 04/21/2025    [] 20 - <9.0                    Albumin <3 Delay surgery &Workup Lab Results   Component Value Date    ALBUMIN 3.8 04/21/2025    [] 20 - <3.0   Smoking Cessation >4 Weeks Delay Surgery  Refer to OP Cessation Class    Current Everyday Smoker [x] 20 - current smoker                                _0.5_ PPD                    Hx of MI, PE, Arrhythmia, CVA, DVT <30 Days Delay Surgery    N/A [] 20      Infection Variable Delay surgery and re-evaluate   N/A [] 20 - recent/current infection     Depression (PHQ) >10 out of 27 Delay Surgery and re-evaluate  Medication  Counseling              [x] 0     []1     []2     []3      []4      [] 5                    (1-4)      (5-9)  (10-14)  (15-19)   (20-27)     Memory Impairment & Memory loss (Mini-Cog Screening Tool) Advanced dementia and/or Parkinson's Reconsider surgery     [x] 0     []1     []2     []3     []4     [] 5     Physical Conditioning (Modified AM-PAC Per Physical Therapy at [facility]) Unable to ambulate on day of " surgery Delay surgery and re-evaluate  Pre-Rehabilitation   (PT evaluation)       [x]  0   []4       []8     []12        []16     []20       (<20%)   (<40%)   (<60%)   (<80% )    (>80%)     Home Environment/Caregiver support  (Per /Navigator Interview)    Availability of basic services and/or approprate assistance during post-operative period Delay surgery and re-evaluate  Safe home environment  Health   1 week post-surgery  Transportation  availability  Ability to obtain DME/Medications post-op    [x] 0     []1     []2     []3     []4     [] 5  [] 0     []1     [x]2     []3     []4     [] 5  [x] 0     []1     []2     []3     []4     [] 5  [x] 0     []1     []2     []3     []4     [] 5         MD Contact: Dr. Franklin Comments:  Total Score:  22

## 2025-04-21 NOTE — PRE ADMISSION SCREENING
Patient Name: Courtney CURRAN Newcastle  YOB: 1960   MRN: [id number]     [facility]   Basic Mobility Inpatient Short Form 6 Clicks         How much difficulty does the patient currently have  Unable  A Lot  A Little  None      1. Turning over in bed (including adjusting bedclothes, sheets and blankets)?     1 []    2 []    3 []    4 [x]        2. Sitting down on and standing up from a chair with arms (e.g., wheelchair, bedside commode, etc.)     1 []  2 []  3 []     4 [x]      3. Moving from lying on back to sitting on the side of the bed?     1 []  2 []  3 [x]    4 []    How much help from another person does the patient currently need  Total  A Lot  A Little  None      4. Moving to and from a bed to a chair (including a wheelchair)?    1 []  2 []  3 []    4 [x]      5. Need to walk in hospital room?    1 []  2 []  3 []    4 [x]      6. Climbing 3-5 steps with a railing?    1 []  2 []  3 []    4 [x]       Raw Score:      23            CMS 0-100% Score:     11.20       %   Standardized Score:   56.93            CMS Modifier:       CI                                   [facility]   Basic Mobility Inpatient Short Form 6 Clicks Score Conversion Table*         *Use this form to convert AM-PAC Basic Mobility Inpatient Raw Scores.   [facility] Inpatient Basic Mobility Short Form Scoring Example   1. Add the number values associated with the response to each item. For example, items totals yield a Raw Score of 21.   2. Match the raw score to the t-Scale scores (t-Scale score = 50.25, SE = 4.69).   3. Find the associated CMS % (CMS % = 28.97%).   4. Locate the correct CMS Functional Modifier Code, or G Code (G code = CJ)     NOTE: Each -PAC Short Form has a separate conversion table. Make sure that you use the correct conversion table.       Instruction Manual - page 45 contains conversion table

## 2025-04-21 NOTE — DISCHARGE INSTRUCTIONS
To confirm, Your doctor has instructed you that surgery is scheduled for: 5/5/25 WITH DR. COREA    Please report to [facility], Registration the morning of surgery. You must check-in and receive a wristband before going to your procedure.  [address] [place] [address] [place] [address]    Pre-Op will call the FRIDAY prior to surgery between 1:00 and 6:00 PM with the final arrival time.  Phone number: [phone]    PLEASE NOTE:  The surgery schedule has many variables which may affect the time of your surgery case.  Family members should be available if your surgery time changes.  Plan to be here the day of your procedure between 4-6 hours.    MEDICATIONS:  TAKE ONLY THESE MEDICATIONS WITH A SMALL SIP OF WATER THE MORNING OF YOUR PROCEDURE:  SEE MED LIST      DO NOT TAKE THESE MEDICATIONS 5-7 DAYS PRIOR to your procedure or per your surgeon's request:   ASPIRIN, ALEVE, ADVIL, IBUPROFEN, FISH OIL VITAMIN E, HERBALS  (May take Tylenol)    ONLY if you are prescribed any types of blood thinners such as:  Aspirin, Coumadin, Plavix, Pradaxa, Xarelto, Aggrenox, Effient, Eliquis, Savasya, Brilinta, or any other, ask your surgeon whether you should stop taking them and how long before surgery you should stop.  You may also need to verify with the prescribing physician if it is ok to stop your medication.      INSTRUCTIONS IMPORTANT!!  Do not eat or drink anything between midnight and the time of your procedure- this includes gum, mints, and candy.  EXCEPT: you may have clear liquids such as:  WATER, BLACK COFFEE, UNSWEET TEA, OR GATORADE (NO RED OR PURPLE) UP TO 2 HOURS PRIOR TO YOUR ARRIVAL TIME.  Do not smoke or drink alcoholic beverages 24 hours prior to your procedure.  Shower the night before AND the morning of your procedure with a Chlorhexidine wash such as Hibiclens or Dial antibacterial soap from the neck down.  Do not get it on your face or in your eyes.  You may use your own shampoo and face wash.  This helps your skin to be as bacteria free as possible.    If you wear contact lenses, dentures, hearing aids or glasses, bring a container to put them in during surgery and give to a family member for safe keeping.  Please leave all jewelry, piercing's and valuables at home. You must remove your false eyelashes prior to surgery.    DO NOT remove hair from the surgery site.  Do not shave the incision site unless you are given specific instructions to do so.    ONLY if you have been diagnosed with sleep apnea please bring your C-PAP machine.  ONLY if you wear home oxygen please bring your portable oxygen tank the day of your procedure.  ONLY if you have a history of OPEN HEART SURGERY you will need a clearance from your Cardiologist per Anesthesia.      ONLY for patients requiring bowel prep, written instructions will be given by your doctor's office.  ONLY if you have a neuro stimulator, please bring the controller with you the morning of surgery  ONLY if a type and screen test is needed before surgery, please return:  If your doctor has scheduled you for an overnight stay, bring a small overnight bag with any personal items you need.  Make arrangements in advance for transportation home by a responsible adult. You can not go home in an uber or a cab per hospital policy.  It is not safe to drive a vehicle during the 24 hours after anesthesia.          All  facilities and properties are tobacco free.  Smoking is NOT allowed.   If you have any questions about these instructions, call Pre-Op Admit  Nursing at [phone] or the Pre-Op Day Surgery Unit at [phone].

## 2025-04-21 NOTE — PRE ADMISSION SCREENING
JOINT CAMP ASSESSMENT    Name Courtney Reilly   MRN [id number]    Age/Sex 65 y.o. female    Surgeon Dr. Ra Franklin   Joint Camp Date 4/21/2025   Surgery Date    Procedure    Insurance Payor: HU[employer] MANAGED MEDICARE / Plan: HU[employer] MEDICARE HMO / Product Type: Capitation /    Care Team Patient Care Team:  Lalitha Mai MD as PCP - General (Internal Medicine)  Gemma Tamez as ED Navigator    Pharmacy   [facility] [place] - [address] [place]  [address] [place]  [place] [address]  Phone: [phone] Fax: [phone]    [facility] - [place], [place] - [address]  [address] [facility] [address]  Phone: [phone] Fax: [phone]     AM-PAC Score   23   Risk Assessment Score 22     Past Medical History:   Diagnosis Date    Arthritis     Chronic back pain     COPD (chronic obstructive pulmonary disease)     Encounter for blood transfusion     Fatigue     Fibromyalgia     Full dentures     GERD (gastroesophageal reflux disease)     Herniated disc     Hypertension     Multiple sclerosis     On home O2     3 liters as needed    Ovarian tumor     Radiculopathy     Restless legs syndrome (RLS)     Spasticity     Stroke     twice    TIA (transient ischemic attack)        Past Surgical History:   Procedure Laterality Date    BREAST BIOPSY      CEREBRAL ANEURYSM REPAIR      HYSTERECTOMY      age 27    KNEE SURGERY  01/01/2008    left    pain pump implant      NM REMOVAL OF OVARY/TUBE(S)      ROBOT-ASSISTED LAPAROSCOPIC REPAIR OF VENTRAL HERNIA Left 02/15/2022    Procedure: ROBOTIC REPAIR, HERNIA, INCISIONAL - LEFT FLANK;  Surgeon: Sixto Clark Jr., MD;  Location: [facility];  Service: General;  Laterality: Left;    SPINE SURGERY  01/01/2006      2 on neck.  2 on lumbar spine    TONSILLECTOMY      WRIST SURGERY  01/01/2007    right         Home Enviroment     Living Arrangement: Lives alone  Home Environment: 1-story house/ trailer, number of outside stairs: 4 with  a railing, walk-in shower  Home Safety Concerns: Pets in the home: cats (1).    DISCHARGE CAREGIVER/SUPPORT SYSTEM     Identified post-op caregiver: Patient has lives alone and children / family / friends to help, Chandrakant Wilkins  for 2-3 days only.  Patient's caregiver(s) will be able to provide physical assistance. Patient will have someone to assist overnight.      Caregiver present at pre-op interview:  No      PRE-OPERATIVE FUNCTIONAL STATUS     Employment: Disabled    Pre-op Functional Status: Patient is independent with mobility/ambulation, transfers, ADL's, IADL's.    Use of assistive device for ambulation: none  ADL: self care  ADL Limitations: difficulty with walking  Medical Restrictions: Unstable ambulation and Decreased range of motions in extremities    POTENTIAL BARRIERS TO DISCHARGE/POTENTIAL POST-OP COMPLICATIONS     Patient has no caregiver support. Patient is a current everyday smoker which could delay wound healing. Patient with hx of chronic back pain, previous blood transfusion, fibromyalgia, RLS, stroke x 2, TIA. POSSIBLE SAME DAY DISCHARGE.     DISCHARGE PLAN     Expected LOS of 1 days or less for joint replacement discussed with patient. We also discussed a discharge path of HH for approximately two weeks with a transition to outpatient PT on the third week given no post-op complications.      Patient in agreement with discharge plan: Yes    Discharge to: Discharge home with home health (PT/OT) x2 weeks with transition to outpatient PT     HH:  [facility] ([place]).  Patient disclosure form completed and sent to case management for upload to the medical record.      OP PT: [facility] ([place]).     Home DME: none    Needed DME at D/C: rolling walker and bedside commode. Patient to purchase BSC from retail prior to surgery.     Rx: Per Dr. Franklin at discharge     Meds to Beds: Yes  Patient expected to discharge on Aspirin 81mg by mouth twice daily for DVT  prophylaxis.

## 2025-04-22 DIAGNOSIS — M17.12 PRIMARY OSTEOARTHRITIS OF LEFT KNEE: Primary | ICD-10-CM

## 2025-04-23 LAB
OHS QRS DURATION: 76 MS
OHS QTC CALCULATION: 415 MS

## 2025-04-28 ENCOUNTER — OFFICE VISIT (OUTPATIENT)
Dept: PULMONOLOGY | Facility: CLINIC | Age: 65
End: 2025-04-28
Payer: MEDICARE

## 2025-04-28 VITALS
HEIGHT: 65 IN | HEART RATE: 68 BPM | OXYGEN SATURATION: 95 % | DIASTOLIC BLOOD PRESSURE: 79 MMHG | SYSTOLIC BLOOD PRESSURE: 124 MMHG | BODY MASS INDEX: 22.61 KG/M2 | WEIGHT: 135.69 LBS

## 2025-04-28 DIAGNOSIS — Z72.0 TOBACCO ABUSE: ICD-10-CM

## 2025-04-28 DIAGNOSIS — R91.1 SOLITARY PULMONARY NODULE: ICD-10-CM

## 2025-04-28 DIAGNOSIS — J44.89 ASTHMA-COPD OVERLAP SYNDROME: ICD-10-CM

## 2025-04-28 DIAGNOSIS — Z80.1 FAMILY HISTORY OF LUNG CANCER: ICD-10-CM

## 2025-04-28 DIAGNOSIS — F17.210 NICOTINE DEPENDENCE, CIGARETTES, UNCOMPLICATED: ICD-10-CM

## 2025-04-28 DIAGNOSIS — G89.4 CHRONIC PAIN SYNDROME: ICD-10-CM

## 2025-04-28 DIAGNOSIS — R00.0 RAPID HEARTBEAT: ICD-10-CM

## 2025-04-28 DIAGNOSIS — J44.1 COPD EXACERBATION: Primary | ICD-10-CM

## 2025-04-28 DIAGNOSIS — J45.50 SEVERE PERSISTENT ASTHMA WITHOUT COMPLICATION: ICD-10-CM

## 2025-04-28 PROCEDURE — 99999 PR PBB SHADOW E&M-EST. PATIENT-LVL III: CPT | Mod: PBBFAC,HCNC,, | Performed by: INTERNAL MEDICINE

## 2025-04-28 RX ORDER — FLUTICASONE FUROATE, UMECLIDINIUM BROMIDE AND VILANTEROL TRIFENATATE 200; 62.5; 25 UG/1; UG/1; UG/1
1 POWDER RESPIRATORY (INHALATION) DAILY
Qty: 60 EACH | Refills: 11 | Status: SHIPPED | OUTPATIENT
Start: 2025-04-28

## 2025-04-28 RX ORDER — FLUTICASONE PROPIONATE AND SALMETEROL 500; 50 UG/1; UG/1
1 POWDER RESPIRATORY (INHALATION) 2 TIMES DAILY
Qty: 60 EACH | Refills: 11 | Status: SHIPPED | OUTPATIENT
Start: 2025-04-28 | End: 2026-04-28

## 2025-04-28 RX ORDER — LEVOFLOXACIN 500 MG/1
500 TABLET, FILM COATED ORAL
COMMUNITY
Start: 2025-04-23

## 2025-04-28 RX ORDER — PREDNISONE 20 MG/1
TABLET ORAL
Qty: 36 TABLET | Refills: 2 | Status: SHIPPED | OUTPATIENT
Start: 2025-04-28

## 2025-04-28 RX ORDER — METHYLPREDNISOLONE 4 MG/1
TABLET ORAL
COMMUNITY
Start: 2025-04-23

## 2025-04-28 RX ORDER — METOPROLOL SUCCINATE 50 MG/1
50 TABLET, EXTENDED RELEASE ORAL DAILY
Qty: 90 TABLET | Refills: 3 | Status: SHIPPED | OUTPATIENT
Start: 2025-04-28 | End: 2026-04-28

## 2025-04-28 RX ORDER — VORTIOXETINE 10 MG/1
1 TABLET, FILM COATED ORAL
COMMUNITY
Start: 2025-04-23

## 2025-04-28 RX ORDER — CODEINE PHOSPHATE AND GUAIFENESIN 10; 100 MG/5ML; MG/5ML
10 SOLUTION ORAL EVERY 4 HOURS PRN
Qty: 237 ML | Refills: 0 | Status: SHIPPED | OUTPATIENT
Start: 2025-04-28

## 2025-04-28 NOTE — PATIENT INSTRUCTIONS
Will send in codeine cough suppression but clear with pain management.    Would double steroids-- use prednisone 20 mg -- use 3 daily for up to 3 days, then 2 daily for up to 3 days, then one daily for up to 3 days    Use trelegy (may cause dry mouth) once daily or advair twice daily -- will give prednisone to lungs to prevent problem      Stop propranolol  as may worsen lungs-- use toprol 50 mg daily or go without heart slowing medications (you use intermittently)     Ct chest this yr compared to last yr -- nodules not worse and one was smaller-- standing order x 5 yrs a with next scan 4/2026

## 2025-04-28 NOTE — PROGRESS NOTES
4/28/2025    Courtney Reilly  New Patient Consult    Chief Complaint   Patient presents with    Follow-up    COPD    Asthma    Pulmonary Nodules       HPI:    4/28/2025 pt has cough productive clear  mucous (sl yellow)   Pt got levaquin and medrol from Dr Brasher recently but did not clear her problem.  The patient has use prednisone fairly frequently in the past.  The dosing of prednisone should have been a little less than what she got with a Medrol Dosepak as far steroid equivalency.  The patient has not been taking controller therapy very well.  Severe as loss of taste and a dry mouth.  Eh calls her more dry mouth in the tolerance was poor.      She was recently started on Inderal because of palpitations or rapid heartbeat that occurred maybe 3 times.  Her increased breathing troubles could be tied in the Inderal.  She takes Inderal, however, intermittently  Pt used prednisone for 14 + courses.       Had ct viewed today from this yr      9/18/2024 pt has had to use prednisone and zithromycin for 3 days on then skips 2 days and feels like needs repeat.      Pt relates pain worsens breathing..    Patient Instructions   Azithromycin response may suggest immune weakness-- would screen immune system-- you had prior pneumonia vaccine around 2014..    Use azithromycin daily  -- stop if side effects, may stop if not effective-- may be effective at 2-3 times weekly.....    Check lung capacity -- if lung capacity very poor more therapy may be reasonable      Eosinophils are high--special shots should stabilize lungs if stop smokes    Red cells are large -- check b12 level...    Would use chantix -- stop if bad effects stomach or bad dreams, etc.,...    Continue oxygen     You should be on inhaled steroids-- use breztri 2 twice daily with spacer -- replaces adviar/sprivia.      Ct chest and pet scan viewed again-- nodules looked smaller and no cancer activity seen-- follow up ct chest December or so needed......     Phone  follow up           2024-pt took prednisone recently -- mucous sl brown, and sob/temple severe.   Pt not uses trelegy daily   From sticky no done 4 weeks ago-Notify CT of the chest is viewed by direct vision-3 nodules are seen.  Would recommend a PET scan in a month.  Would recommend office follow-up around that time to review.  Notify.  From PET scan done on -Impression:     Decreased size of the spiculated nodules within the right upper lobe with mild emphysematous changes.  There is no FDG activity.  Follow-up CT chest in 6 months is recommended     No abnormal FDG activity        Electronically signed by:Becky Naranjo  Date:                                            2024  Patient Instructions   Lung nodules did not have pet scan cancer type activity -- nodules seen on ct   and not seen on ct  - viewed.      Ct needs follow up in 6 months  to make sure no cancer    Use advair 500 twice daily    Try spriva 1-2 daiy ( stop    dry mouth)-- use dosing to get breathing optimal      Use prednisone more-- inhaled steroid may make more stable.    Use prednisoen up to weekly for next month (if needed) -- advair should keep stable      Use albuterol as much as needed.    May use azithromycin for colored mucous.  Would do culture as mucous remains brown (smoking)      Ct scan and pet scan did not shoe cause for back pains...                2024 pt smoker with chr back pain, son with substance use  recently--     Pt has excess hardware in back from back dz.  Mom and dad both had lung cancer along with uncles and granddad.       Pt had had pneumonia 2/yr, got pneumonia vaccine.    Used bronchitis rx with once yearly or so-- not worse at night..    Still smokes..     Patient Instructions   Ct chest  viewed and lungs looked good with min emphysema -- ct chest at [facility] 2024 no lung lesion (cannot view films).      You have chronic bronchitis and high risk for lung  cancer...    Could measure lung capacity with breathing test -- you should have some degree of copd    Trial trelegy once daily reasonable-- should decrease broncitis.    Use albuterol as needed  if more cough or short breath.  May use prednisone if albuterol not clearing bronchitis.    Azithromycin may be used for infection if needed    You may have had immune weakness.      Ct chest 1/2024 with no worrisome findings     Ct screening would be reasonable for lung cancer as you are high risk.   Computer suggest not covered well-- may be.  Will order standing order for ct yearly for 5 yrs.  May do ct til age 82    You need to stop smokes....  PFSH:  Past Medical History:   Diagnosis Date    Arthritis     Chronic back pain     COPD (chronic obstructive pulmonary disease)     Encounter for blood transfusion     Fatigue     Fibromyalgia     Full dentures     GERD (gastroesophageal reflux disease)     Herniated disc     Hypertension     Multiple sclerosis     On home O2     3 liters as needed    Ovarian tumor     Radiculopathy     Restless legs syndrome (RLS)     Spasticity     Stroke     twice    TIA (transient ischemic attack)          Past Surgical History:   Procedure Laterality Date    BREAST BIOPSY      CEREBRAL ANEURYSM REPAIR      HYSTERECTOMY      age 27    KNEE SURGERY  01/01/2008    left    pain pump implant      MT REMOVAL OF OVARY/TUBE(S)      ROBOT-ASSISTED LAPAROSCOPIC REPAIR OF VENTRAL HERNIA Left 02/15/2022    Procedure: ROBOTIC REPAIR, HERNIA, INCISIONAL - LEFT FLANK;  Surgeon: Sixto Clark Jr., MD;  Location: [facility];  Service: General;  Laterality: Left;    SPINE SURGERY  01/01/2006      2 on neck.  2 on lumbar spine    TONSILLECTOMY      WRIST SURGERY  01/01/2007    right     Social History     Tobacco Use    Smoking status: Every Day     Current packs/day: 0.50     Average packs/day: 0.5 packs/day for 30.0 years (15.0 ttl pk-yrs)     Types: Cigarettes    Smokeless tobacco: Never    Tobacco  "comments:     .    Occup:   disabled.   , lives alone.     Substance Use Topics    Alcohol use: Yes     Comment: socially    Drug use: No     Family History   Problem Relation Name Age of Onset    Cancer Mother          Lung cancer    Cancer Father          Lung cancer     Review of patient's allergies indicates:   Allergen Reactions    Bee pollen Hives    Bee pollens     Mushroom flavor Hives          Review of Systems:  a review of eleven systems covering constitutional, Eye, HEENT, Psych, Respiratory, Cardiac, GI, , Musculoskeletal, Endocrine, Dermatologic was negative except for pertinent findings as listed ABOVE and belo     pertinent positives as above, rest good        Exam:Comprehensive exam done. /79 (BP Location: Left arm, Patient Position: Sitting)   Pulse 68   Ht 5' 5" (1.651 m)   Wt 61.5 kg (135 lb 11.1 oz)   SpO2 95% Comment: on room air at rest.  BMI 22.58 kg/m²   Exam included Vitals as listed, and patient's appearance and affect and alertness and mood, oral exam for yeast and hygiene and pharynx lesions and Mallapatti (M) score, neck with inspection for jvd and masses and thyroid abnormalities and lymph nodes (supraclavicular and infraclavicular nodes and axillary also examined and noted if abn), chest exam included symmetry and effort and fremitus and percussion and auscultation, cardiac exam included rhythm and gallops and murmur and rubs and jvd and edema, abdominal exam for mass and hepatosplenomegaly and tenderness and hernias and bowel sounds, Musculoskeletal exam with muscle tone and posture and mobility/gait and  strength, and skin for rashes and cyanosis and pallor and turgor, extremity for clubbing.  Findings were normal except for pertinent findings listed below:  M1,chest is symmetric, no distress, normal percussion, normal fremitus and good normal breath sounds           Radiographs (ct chest and cxr) reviewed: view by direct vision      Labs reviewed       "     PFT will be done and results to be reviewed       Plan:  Clinical impression is apparently straight forward and impression with management as below.     Courtney was seen today for follow-up, copd, asthma and pulmonary nodules.    Diagnoses and all orders for this visit:    COPD exacerbation  -     predniSONE (DELTASONE) 20 MG tablet; 3 for 3 days then 2 for 3 days then one for 3 days and repeat for breathing problems  -     guaiFENesin-codeine 100-10 mg/5 ml (TUSSI-ORGANIDIN NR)  mg/5 mL syrup; Take 10 mLs by mouth every 4 (four) hours as needed for Cough.  -     fluticasone-salmeterol diskus inhaler 500-50 mcg; Inhale 1 puff into the lungs 2 (two) times daily. Controller    Nicotine dependence, cigarettes, uncomplicated  -     CT Chest Lung Screening Low Dose; Standing  -     fluticasone-umeclidin-vilanter (TRELEGY ELLIPTA) 200-62.5-25 mcg inhaler; Inhale 1 puff into the lungs once daily.    Severe persistent asthma without complication  -     predniSONE (DELTASONE) 20 MG tablet; 3 for 3 days then 2 for 3 days then one for 3 days and repeat for breathing problems  -     guaiFENesin-codeine 100-10 mg/5 ml (TUSSI-ORGANIDIN NR)  mg/5 mL syrup; Take 10 mLs by mouth every 4 (four) hours as needed for Cough.  -     fluticasone-salmeterol diskus inhaler 500-50 mcg; Inhale 1 puff into the lungs 2 (two) times daily. Controller    Tobacco abuse    Solitary pulmonary nodule  -     fluticasone-umeclidin-vilanter (TRELEGY ELLIPTA) 200-62.5-25 mcg inhaler; Inhale 1 puff into the lungs once daily.    Family history of lung cancer  -     fluticasone-umeclidin-vilanter (TRELEGY ELLIPTA) 200-62.5-25 mcg inhaler; Inhale 1 puff into the lungs once daily.    Chronic pain syndrome    Asthma-COPD overlap syndrome    Rapid heartbeat  -     metoprolol succinate (TOPROL-XL) 50 MG 24 hr tablet; Take 1 tablet (50 mg total) by mouth once daily.              No follow-ups on file.    Discussed with patient above for education the  following:      Patient Instructions   Will send in codeine cough suppression but clear with pain management.    Would double steroids-- use prednisone 20 mg -- use 3 daily for up to 3 days, then 2 daily for up to 3 days, then one daily for up to 3 days    Use trelegy (may cause dry mouth) once daily or advair twice daily -- will give prednisone to lungs to prevent problem      Stop propranolol  as may worsen lungs-- use toprol 50 mg daily or go without heart slowing medications (you use intermittently)     Ct chest this yr compared to last yr -- nodules not worse and one was smaller-- standing order x 5 yrs a with next scan 4/2026           Evaluation took 33 minutes

## 2025-04-30 DIAGNOSIS — M17.12 PRIMARY OSTEOARTHRITIS OF LEFT KNEE: Primary | ICD-10-CM

## 2025-04-30 RX ORDER — ONDANSETRON 4 MG/1
4 TABLET, FILM COATED ORAL EVERY 8 HOURS PRN
Qty: 30 TABLET | Refills: 0 | Status: SHIPPED | OUTPATIENT
Start: 2025-04-30 | End: 2025-05-02

## 2025-04-30 RX ORDER — ASPIRIN 81 MG/1
81 TABLET ORAL EVERY 12 HOURS
Qty: 60 TABLET | Refills: 0 | Status: SHIPPED | OUTPATIENT
Start: 2025-04-30 | End: 2025-05-02

## 2025-04-30 RX ORDER — CELECOXIB 200 MG/1
200 CAPSULE ORAL 2 TIMES DAILY
Qty: 60 CAPSULE | Refills: 0 | Status: SHIPPED | OUTPATIENT
Start: 2025-04-30 | End: 2025-05-02

## 2025-04-30 RX ORDER — ASPIRIN 81 MG
100 TABLET, DELAYED RELEASE (ENTERIC COATED) ORAL 2 TIMES DAILY
Qty: 60 TABLET | Refills: 0 | Status: SHIPPED | OUTPATIENT
Start: 2025-04-30 | End: 2025-05-02

## 2025-04-30 RX ORDER — GABAPENTIN 300 MG/1
300 CAPSULE ORAL 2 TIMES DAILY
Qty: 60 CAPSULE | Refills: 0 | Status: SHIPPED | OUTPATIENT
Start: 2025-04-30 | End: 2025-05-02

## 2025-07-17 ENCOUNTER — PATIENT MESSAGE (OUTPATIENT)
Dept: ORTHOPEDICS | Facility: CLINIC | Age: 65
End: 2025-07-17
Payer: MEDICARE

## 2025-07-17 ENCOUNTER — OFFICE VISIT (OUTPATIENT)
Dept: ORTHOPEDICS | Facility: CLINIC | Age: 65
End: 2025-07-17
Payer: MEDICARE

## 2025-07-17 VITALS — HEIGHT: 65 IN | WEIGHT: 135.56 LBS | BODY MASS INDEX: 22.59 KG/M2

## 2025-07-17 DIAGNOSIS — M17.12 PRIMARY OSTEOARTHRITIS OF LEFT KNEE: Primary | ICD-10-CM

## 2025-07-17 DIAGNOSIS — Z01.818 PRE-OP TESTING: ICD-10-CM

## 2025-07-17 PROCEDURE — 3008F BODY MASS INDEX DOCD: CPT | Mod: CPTII,HCNC,S$GLB, | Performed by: ORTHOPAEDIC SURGERY

## 2025-07-17 PROCEDURE — 4010F ACE/ARB THERAPY RXD/TAKEN: CPT | Mod: CPTII,HCNC,S$GLB, | Performed by: ORTHOPAEDIC SURGERY

## 2025-07-17 PROCEDURE — 99999 PR PBB SHADOW E&M-EST. PATIENT-LVL IV: CPT | Mod: PBBFAC,HCNC,, | Performed by: ORTHOPAEDIC SURGERY

## 2025-07-17 PROCEDURE — 1160F RVW MEDS BY RX/DR IN RCRD: CPT | Mod: CPTII,HCNC,S$GLB, | Performed by: ORTHOPAEDIC SURGERY

## 2025-07-17 PROCEDURE — 1159F MED LIST DOCD IN RCRD: CPT | Mod: CPTII,HCNC,S$GLB, | Performed by: ORTHOPAEDIC SURGERY

## 2025-07-17 PROCEDURE — 99213 OFFICE O/P EST LOW 20 MIN: CPT | Mod: HCNC,S$GLB,, | Performed by: ORTHOPAEDIC SURGERY

## 2025-07-17 RX ORDER — CEFAZOLIN SODIUM 2 G/50ML
2 SOLUTION INTRAVENOUS
OUTPATIENT
Start: 2025-07-17

## 2025-07-17 NOTE — H&P (VIEW-ONLY)
[facility]    Subjective:     Chief Complaint:   Chief Complaint   Patient presents with    Left Knee - Pain     Left TKA scheduled for August 8, needs to resign consents. States pain has gotten worse since last visit, having some swelling        Past Medical History:   Diagnosis Date    Arthritis     Chronic back pain     COPD (chronic obstructive pulmonary disease)     Encounter for blood transfusion     Fatigue     Fibromyalgia     Full dentures     GERD (gastroesophageal reflux disease)     Herniated disc     Hypertension     Multiple sclerosis     On home O2     3 liters as needed    Ovarian tumor     Radiculopathy     Restless legs syndrome (RLS)     Spasticity     Stroke     twice    TIA (transient ischemic attack)        Past Surgical History:   Procedure Laterality Date    BREAST BIOPSY      CEREBRAL ANEURYSM REPAIR      HYSTERECTOMY      age 27    KNEE SURGERY  01/01/2008    left    pain pump implant      MO REMOVAL OF OVARY/TUBE(S)      ROBOT-ASSISTED LAPAROSCOPIC REPAIR OF VENTRAL HERNIA Left 02/15/2022    Procedure: ROBOTIC REPAIR, HERNIA, INCISIONAL - LEFT FLANK;  Surgeon: Sixto Clark Jr., MD;  Location: [facility];  Service: General;  Laterality: Left;    SPINE SURGERY  01/01/2006      2 on neck.  2 on lumbar spine    TONSILLECTOMY      WRIST SURGERY  01/01/2007    right       Current Outpatient Medications   Medication Sig    albuterol (PROVENTIL/VENTOLIN HFA) 90 mcg/actuation inhaler Inhale 2 puffs into the lungs every 4 (four) hours as needed for Wheezing or Shortness of Breath. 2 puffs every 4 hours as needed for cough, wheeze, or shortness of breath    cyanocobalamin, vitamin B-12, (B-12 DOTS ORAL) Take by mouth.    dantrolene (DANTRIUM) 50 MG Cap Take 50 mg by mouth 3 (three) times daily. Only takes at night    fluticasone-salmeterol diskus inhaler 500-50 mcg Inhale 1 puff into the lungs 2 (two) times daily. Controller    fluticasone-umeclidin-vilanter (TRELEGY ELLIPTA)  200-62.5-25 mcg inhaler Inhale 1 puff into the lungs once daily.    irbesartan (AVAPRO) 150 MG tablet Take 150 mg by mouth once daily.    melatonin 5 mg Cap Take 1 capsule by mouth every evening.    metoprolol succinate (TOPROL-XL) 50 MG 24 hr tablet Take 1 tablet (50 mg total) by mouth once daily.    multivitamin with minerals tablet Take 1 tablet by mouth once daily.    omeprazole (PRILOSEC) 20 MG capsule     oxyCODONE-acetaminophen (PERCOCET)  mg per tablet Take 1 tablet by mouth 4 (four) times daily as needed.    OXYGEN-AIR DELIVERY SYSTEMS MISC 3 L by Misc.(Non-Drug; Combo Route) route as needed.    pramipexole 1.5 mg Tb24     predniSONE (DELTASONE) 20 MG tablet Take one daily for 3 days and may repeat for shortness of breath.    predniSONE (DELTASONE) 20 MG tablet 3 for 3 days then 2 for 3 days then one for 3 days and repeat for breathing problems    QUEtiapine (SEROQUEL) 25 MG Tab take 1 - 2 tablets BY MOUTH ONCE DAILY IN THE EVENING AS NEEDED    rosuvastatin (CRESTOR) 5 MG tablet Take 5 mg by mouth once daily.    solifenacin (VESICARE) 5 MG tablet Take 10 mg by mouth once daily.    sumatriptan (IMITREX) 100 MG tablet sumatriptan 100 mg tablet   TAKE ONE TABLET BY MOUTH AS NEEDED FOR MIGRAINE, MAY REPEAT in ONE HOUR AS NEEDED FOR HEADACHE max TWO PER DAY    tiZANidine (ZANAFLEX) 4 MG tablet Take 4 mg by mouth every 8 (eight) hours.    TRINTELLIX 10 mg Tab Take 1 tablet by mouth.    vitamin E 400 UNIT capsule Take 400 Units by mouth once daily.    EPINEPHrine (EPIPEN 2-MELANIE) 0.3 mg/0.3 mL AtIn Inject 0.3 mLs (0.3 mg total) into the muscle once. for 1 dose    guaiFENesin-codeine 100-10 mg/5 ml (TUSSI-ORGANIDIN NR)  mg/5 mL syrup Take 10 mLs by mouth every 4 (four) hours as needed for Cough.    levoFLOXacin (LEVAQUIN) 500 MG tablet Take 500 mg by mouth.    methylPREDNISolone (MEDROL DOSEPACK) 4 mg tablet Take by mouth.    zolpidem (AMBIEN) 10 mg Tab Take 5 mg by mouth nightly as needed.     No current  facility-administered medications for this visit.       Review of patient's allergies indicates:   Allergen Reactions    Bee pollen Hives    Bee pollens     Mushroom flavor Hives       Family History   Problem Relation Name Age of Onset    Cancer Mother          Lung cancer    Cancer Father          Lung cancer       Social History[1]    History of present illness: 65-year-old female, returns to clinic today for follow up on the left knee.  She has known left knee arthrosis with full-thickness chondral loss demonstrated on MRI.  We have had her scheduled for robotic total knee arthroplasty several times which has been canceled.  Most recently in April of this year.  She is here again today ready to proceed with left knee arthroplasty.  She is currently tentatively scheduled for August 4th.      Review of Systems:    Constitution: Negative for chills, fever, and sweats.  Negative for unexplained weight loss.    HENT:  Negative for headaches and blurry vision.    Cardiovascular:Negative for chest pain or irregular heart beat. Negative for hypertension.    Respiratory:  Negative for cough and shortness of breath.    Gastrointestinal: Negative for abdominal pain, heartburn, melena, nausea, and vomitting.    Genitourinary:  Negative bladder incontinence and dysuria.    Musculoskeletal:  See HPI for details.     Neurological: Negative for numbness.    Psychiatric/Behavioral: Negative for depression.  The patient is not nervous/anxious.      Endocrine: Negative for polyuria    Hematologic/Lymphatic: Negative for bleeding problem.  Does not bruise/bleed easily.    Skin: Negative for poor would healing and rash    Objective:      Physical Examination:    Vital Signs:  There were no vitals filed for this visit.    Body mass index is 22.56 kg/m².    This a well-developed, well nourished patient in no acute distress.  They are alert and oriented and cooperative to examination.          Examination of the left knee, skin is dry  "and intact, no erythema or ecchymosis, no signs symptoms of infection.  Range motion 0-120 degrees.  Stable to anterior-posterior varus and valgus stress.  Homans signs negative, straight leg raise is negative.  Distally neurovascularly intact.    Pertinent New Results:    XRAY Report / Interpretation:     MRI left knee reviewed along with the x-rays.  X-rays do not show severe degeneration or collapse but MRI with large areas of full-thickness cartilage loss throughout the left knee.    Assessment/Plan:        Chronic left knee pain, osteoarthritis left knee.  Plan to proceed with robotic left total knee arthroplasty.  Risks and benefits were again discussed with the patient in great detail.  She wishes to proceed.      Patient was consented for surgery. Risks and benefits of the procedure were discussed in great detail to include the expected preoperative, intraoperative and postoperative courses. Complications may include but are not limited to bleeding, infection, damage to nerves, arteries, blood vessels, bones, tendons, ligaments, stiffness, instability, deep vein thrombus (DVT), pulmonary embolus (PE), altered sensation, continued pain, RSD, loss of motion or a need for additional surgery. As well as general anesthetic complications including seizure, stroke, heart attack and even death.      The mobility limitation cannot be sufficiently resolved by the use of a cane. Patient's functional mobility deficit can be sufficiently resolved with the use of a (Rolling Walker or Walker). Patient's mobility limitation significantly impairs their ability to participate in one of more activities of daily living. The use of a (RW or Walker) will significantly improve the patient's ability to participate in MRADLS and the patient will use it on regular basis in the home.      Sdi De Luna, Physician Assistant, served in the capacity as a "scribe" for this patient encounter.  A "face-to-face" encounter occurred with Dr." "Ra Franklin on this date.  The treatment plan and medical decision-making is outlined above. Patient was seen and examined with a chaperone. Sid De Luna, Physician Assistant, served in the capacity as a "scribe" for this patient encounter.  A "face-to-face" encounter occurred with Dr. Ra Franklin on this date.  The treatment plan and medical decision-making is outlined above. Patient was seen and examined with a chaperone.     This note was created using Dragon voice recognition software that occasionally misinterpreted phrases or words.               [1]   Social History  Socioeconomic History    Marital status:    Tobacco Use    Smoking status: Every Day     Current packs/day: 0.50     Average packs/day: 0.5 packs/day for 30.0 years (15.0 ttl pk-yrs)     Types: Cigarettes    Smokeless tobacco: Never    Tobacco comments:     .    Occup:   disabled.   , lives alone.     Substance and Sexual Activity    Alcohol use: Yes     Comment: socially    Drug use: No    Sexual activity: Never     Partners: Male     Social Drivers of Health     Financial Resource Strain: Low Risk  (2024)    Overall Financial Resource Strain (CARDIA)     Difficulty of Paying Living Expenses: Not very hard   Food Insecurity: No Food Insecurity (2024)    Hunger Vital Sign     Worried About Running Out of Food in the Last Year: Never true     Ran Out of Food in the Last Year: Never true   Transportation Needs: Unmet Transportation Needs (2024)    TRANSPORTATION NEEDS     Transportation : Yes, it has kept me from medical appointments or from getting my medications.   Physical Activity: Insufficiently Active (5/15/2024)    Exercise Vital Sign     Days of Exercise per Week: 1 day     Minutes of Exercise per Session: 10 min   Stress: No Stress Concern Present (2024)    [employer] - Occupational Stress Questionnaire     Feeling of Stress : Only a little   Housing Stability: " Unknown (9/27/2024)    Housing Stability Vital Sign     Unable to Pay for Housing in the Last Year: No     Homeless in the Last Year: No

## 2025-07-17 NOTE — PROGRESS NOTES
[facility]    Subjective:     Chief Complaint:   Chief Complaint   Patient presents with    Left Knee - Pain     Left TKA scheduled for August 8, needs to resign consents. States pain has gotten worse since last visit, having some swelling        Past Medical History:   Diagnosis Date    Arthritis     Chronic back pain     COPD (chronic obstructive pulmonary disease)     Encounter for blood transfusion     Fatigue     Fibromyalgia     Full dentures     GERD (gastroesophageal reflux disease)     Herniated disc     Hypertension     Multiple sclerosis     On home O2     3 liters as needed    Ovarian tumor     Radiculopathy     Restless legs syndrome (RLS)     Spasticity     Stroke     twice    TIA (transient ischemic attack)        Past Surgical History:   Procedure Laterality Date    BREAST BIOPSY      CEREBRAL ANEURYSM REPAIR      HYSTERECTOMY      age 27    KNEE SURGERY  01/01/2008    left    pain pump implant      CT REMOVAL OF OVARY/TUBE(S)      ROBOT-ASSISTED LAPAROSCOPIC REPAIR OF VENTRAL HERNIA Left 02/15/2022    Procedure: ROBOTIC REPAIR, HERNIA, INCISIONAL - LEFT FLANK;  Surgeon: Sixto Clark Jr., MD;  Location: [facility];  Service: General;  Laterality: Left;    SPINE SURGERY  01/01/2006      2 on neck.  2 on lumbar spine    TONSILLECTOMY      WRIST SURGERY  01/01/2007    right       Current Outpatient Medications   Medication Sig    albuterol (PROVENTIL/VENTOLIN HFA) 90 mcg/actuation inhaler Inhale 2 puffs into the lungs every 4 (four) hours as needed for Wheezing or Shortness of Breath. 2 puffs every 4 hours as needed for cough, wheeze, or shortness of breath    cyanocobalamin, vitamin B-12, (B-12 DOTS ORAL) Take by mouth.    dantrolene (DANTRIUM) 50 MG Cap Take 50 mg by mouth 3 (three) times daily. Only takes at night    fluticasone-salmeterol diskus inhaler 500-50 mcg Inhale 1 puff into the lungs 2 (two) times daily. Controller    fluticasone-umeclidin-vilanter (TRELEGY ELLIPTA)  200-62.5-25 mcg inhaler Inhale 1 puff into the lungs once daily.    irbesartan (AVAPRO) 150 MG tablet Take 150 mg by mouth once daily.    melatonin 5 mg Cap Take 1 capsule by mouth every evening.    metoprolol succinate (TOPROL-XL) 50 MG 24 hr tablet Take 1 tablet (50 mg total) by mouth once daily.    multivitamin with minerals tablet Take 1 tablet by mouth once daily.    omeprazole (PRILOSEC) 20 MG capsule     oxyCODONE-acetaminophen (PERCOCET)  mg per tablet Take 1 tablet by mouth 4 (four) times daily as needed.    OXYGEN-AIR DELIVERY SYSTEMS MISC 3 L by Misc.(Non-Drug; Combo Route) route as needed.    pramipexole 1.5 mg Tb24     predniSONE (DELTASONE) 20 MG tablet Take one daily for 3 days and may repeat for shortness of breath.    predniSONE (DELTASONE) 20 MG tablet 3 for 3 days then 2 for 3 days then one for 3 days and repeat for breathing problems    QUEtiapine (SEROQUEL) 25 MG Tab take 1 - 2 tablets BY MOUTH ONCE DAILY IN THE EVENING AS NEEDED    rosuvastatin (CRESTOR) 5 MG tablet Take 5 mg by mouth once daily.    solifenacin (VESICARE) 5 MG tablet Take 10 mg by mouth once daily.    sumatriptan (IMITREX) 100 MG tablet sumatriptan 100 mg tablet   TAKE ONE TABLET BY MOUTH AS NEEDED FOR MIGRAINE, MAY REPEAT in ONE HOUR AS NEEDED FOR HEADACHE max TWO PER DAY    tiZANidine (ZANAFLEX) 4 MG tablet Take 4 mg by mouth every 8 (eight) hours.    TRINTELLIX 10 mg Tab Take 1 tablet by mouth.    vitamin E 400 UNIT capsule Take 400 Units by mouth once daily.    EPINEPHrine (EPIPEN 2-MELANIE) 0.3 mg/0.3 mL AtIn Inject 0.3 mLs (0.3 mg total) into the muscle once. for 1 dose    guaiFENesin-codeine 100-10 mg/5 ml (TUSSI-ORGANIDIN NR)  mg/5 mL syrup Take 10 mLs by mouth every 4 (four) hours as needed for Cough.    levoFLOXacin (LEVAQUIN) 500 MG tablet Take 500 mg by mouth.    methylPREDNISolone (MEDROL DOSEPACK) 4 mg tablet Take by mouth.    zolpidem (AMBIEN) 10 mg Tab Take 5 mg by mouth nightly as needed.     No current  facility-administered medications for this visit.       Review of patient's allergies indicates:   Allergen Reactions    Bee pollen Hives    Bee pollens     Mushroom flavor Hives       Family History   Problem Relation Name Age of Onset    Cancer Mother          Lung cancer    Cancer Father          Lung cancer       Social History[1]    History of present illness: 65-year-old female, returns to clinic today for follow up on the left knee.  She has known left knee arthrosis with full-thickness chondral loss demonstrated on MRI.  We have had her scheduled for robotic total knee arthroplasty several times which has been canceled.  Most recently in April of this year.  She is here again today ready to proceed with left knee arthroplasty.  She is currently tentatively scheduled for August 4th.      Review of Systems:    Constitution: Negative for chills, fever, and sweats.  Negative for unexplained weight loss.    HENT:  Negative for headaches and blurry vision.    Cardiovascular:Negative for chest pain or irregular heart beat. Negative for hypertension.    Respiratory:  Negative for cough and shortness of breath.    Gastrointestinal: Negative for abdominal pain, heartburn, melena, nausea, and vomitting.    Genitourinary:  Negative bladder incontinence and dysuria.    Musculoskeletal:  See HPI for details.     Neurological: Negative for numbness.    Psychiatric/Behavioral: Negative for depression.  The patient is not nervous/anxious.      Endocrine: Negative for polyuria    Hematologic/Lymphatic: Negative for bleeding problem.  Does not bruise/bleed easily.    Skin: Negative for poor would healing and rash    Objective:      Physical Examination:    Vital Signs:  There were no vitals filed for this visit.    Body mass index is 22.56 kg/m².    This a well-developed, well nourished patient in no acute distress.  They are alert and oriented and cooperative to examination.          Examination of the left knee, skin is dry  "and intact, no erythema or ecchymosis, no signs symptoms of infection.  Range motion 0-120 degrees.  Stable to anterior-posterior varus and valgus stress.  Homans signs negative, straight leg raise is negative.  Distally neurovascularly intact.    Pertinent New Results:    XRAY Report / Interpretation:     MRI left knee reviewed along with the x-rays.  X-rays do not show severe degeneration or collapse but MRI with large areas of full-thickness cartilage loss throughout the left knee.    Assessment/Plan:        Chronic left knee pain, osteoarthritis left knee.  Plan to proceed with robotic left total knee arthroplasty.  Risks and benefits were again discussed with the patient in great detail.  She wishes to proceed.      Patient was consented for surgery. Risks and benefits of the procedure were discussed in great detail to include the expected preoperative, intraoperative and postoperative courses. Complications may include but are not limited to bleeding, infection, damage to nerves, arteries, blood vessels, bones, tendons, ligaments, stiffness, instability, deep vein thrombus (DVT), pulmonary embolus (PE), altered sensation, continued pain, RSD, loss of motion or a need for additional surgery. As well as general anesthetic complications including seizure, stroke, heart attack and even death.      The mobility limitation cannot be sufficiently resolved by the use of a cane. Patient's functional mobility deficit can be sufficiently resolved with the use of a (Rolling Walker or Walker). Patient's mobility limitation significantly impairs their ability to participate in one of more activities of daily living. The use of a (RW or Walker) will significantly improve the patient's ability to participate in MRADLS and the patient will use it on regular basis in the home.      Sid De Luna, Physician Assistant, served in the capacity as a "scribe" for this patient encounter.  A "face-to-face" encounter occurred with Dr." "Ra Franklin on this date.  The treatment plan and medical decision-making is outlined above. Patient was seen and examined with a chaperone. Sid De Luna, Physician Assistant, served in the capacity as a "scribe" for this patient encounter.  A "face-to-face" encounter occurred with Dr. Ra Franklin on this date.  The treatment plan and medical decision-making is outlined above. Patient was seen and examined with a chaperone.     This note was created using Dragon voice recognition software that occasionally misinterpreted phrases or words.               [1]   Social History  Socioeconomic History    Marital status:    Tobacco Use    Smoking status: Every Day     Current packs/day: 0.50     Average packs/day: 0.5 packs/day for 30.0 years (15.0 ttl pk-yrs)     Types: Cigarettes    Smokeless tobacco: Never    Tobacco comments:     .    Occup:   disabled.   , lives alone.     Substance and Sexual Activity    Alcohol use: Yes     Comment: socially    Drug use: No    Sexual activity: Never     Partners: Male     Social Drivers of Health     Financial Resource Strain: Low Risk  (2024)    Overall Financial Resource Strain (CARDIA)     Difficulty of Paying Living Expenses: Not very hard   Food Insecurity: No Food Insecurity (2024)    Hunger Vital Sign     Worried About Running Out of Food in the Last Year: Never true     Ran Out of Food in the Last Year: Never true   Transportation Needs: Unmet Transportation Needs (2024)    TRANSPORTATION NEEDS     Transportation : Yes, it has kept me from medical appointments or from getting my medications.   Physical Activity: Insufficiently Active (5/15/2024)    Exercise Vital Sign     Days of Exercise per Week: 1 day     Minutes of Exercise per Session: 10 min   Stress: No Stress Concern Present (2024)    [employer] - Occupational Stress Questionnaire     Feeling of Stress : Only a little   Housing Stability: " Unknown (9/27/2024)    Housing Stability Vital Sign     Unable to Pay for Housing in the Last Year: No     Homeless in the Last Year: No

## 2025-07-23 ENCOUNTER — HOSPITAL ENCOUNTER (OUTPATIENT)
Dept: PREADMISSION TESTING | Facility: HOSPITAL | Age: 65
Discharge: HOME | End: 2025-07-23
Attending: ORTHOPAEDIC SURGERY
Payer: MEDICARE

## 2025-07-23 DIAGNOSIS — Z01.818 PRE-OP TESTING: ICD-10-CM

## 2025-07-23 DIAGNOSIS — M17.12 PRIMARY OSTEOARTHRITIS OF LEFT KNEE: ICD-10-CM

## 2025-07-23 LAB
ABSOLUTE EOSINOPHIL (SMH): 0.46 K/UL
ABSOLUTE MONOCYTE (SMH): 0.56 K/UL (ref 0.3–1)
ABSOLUTE NEUTROPHIL COUNT (SMH): 3.4 K/UL (ref 1.8–7.7)
ALBUMIN SERPL-MCNC: 4 G/DL (ref 3.5–5.2)
ALP SERPL-CCNC: 86 UNIT/L (ref 40–150)
ALT SERPL-CCNC: <8 UNIT/L (ref 10–44)
ANION GAP (SMH): 9 MMOL/L (ref 8–16)
AST SERPL-CCNC: 21 UNIT/L (ref 11–45)
BASOPHILS # BLD AUTO: 0.07 K/UL
BASOPHILS NFR BLD AUTO: 1.1 %
BILIRUB SERPL-MCNC: 0.3 MG/DL (ref 0.1–1)
BUN SERPL-MCNC: 17 MG/DL (ref 8–23)
CALCIUM SERPL-MCNC: 8.7 MG/DL (ref 8.7–10.5)
CHLORIDE SERPL-SCNC: 106 MMOL/L (ref 95–110)
CO2 SERPL-SCNC: 26 MMOL/L (ref 23–29)
CREAT SERPL-MCNC: 0.8 MG/DL (ref 0.5–1.4)
ERYTHROCYTE [DISTWIDTH] IN BLOOD BY AUTOMATED COUNT: 14.9 % (ref 11.5–14.5)
GFR SERPLBLD CREATININE-BSD FMLA CKD-EPI: >60 ML/MIN/1.73/M2
GLUCOSE SERPL-MCNC: 95 MG/DL (ref 70–110)
HCT VFR BLD AUTO: 39.9 % (ref 37–48.5)
HGB BLD-MCNC: 12.5 GM/DL (ref 12–16)
IMM GRANULOCYTES # BLD AUTO: 0.02 K/UL (ref 0–0.04)
IMM GRANULOCYTES NFR BLD AUTO: 0.3 % (ref 0–0.5)
LYMPHOCYTES # BLD AUTO: 2.02 K/UL (ref 1–4.8)
MCH RBC QN AUTO: 30.4 PG (ref 27–31)
MCHC RBC AUTO-ENTMCNC: 31.3 G/DL (ref 32–36)
MCV RBC AUTO: 97 FL (ref 82–98)
NUCLEATED RBC (/100WBC) (SMH): 0 /100 WBC
PLATELET # BLD AUTO: 375 K/UL (ref 150–450)
PMV BLD AUTO: 8.5 FL (ref 9.2–12.9)
POTASSIUM SERPL-SCNC: 3.9 MMOL/L (ref 3.5–5.1)
PROT SERPL-MCNC: 6.3 GM/DL (ref 6–8.4)
RBC # BLD AUTO: 4.11 M/UL (ref 4–5.4)
RELATIVE EOSINOPHIL (SMH): 7.1 % (ref 0–8)
RELATIVE LYMPHOCYTE (SMH): 31.2 % (ref 18–48)
RELATIVE MONOCYTE (SMH): 8.6 % (ref 4–15)
RELATIVE NEUTROPHIL (SMH): 51.7 % (ref 38–73)
SODIUM SERPL-SCNC: 141 MMOL/L (ref 136–145)
WBC # BLD AUTO: 6.48 K/UL (ref 3.9–12.7)

## 2025-07-23 PROCEDURE — 82374 ASSAY BLOOD CARBON DIOXIDE: CPT | Performed by: ORTHOPAEDIC SURGERY

## 2025-07-23 PROCEDURE — 36415 COLL VENOUS BLD VENIPUNCTURE: CPT | Performed by: ORTHOPAEDIC SURGERY

## 2025-07-23 PROCEDURE — 85025 COMPLETE CBC W/AUTO DIFF WBC: CPT | Performed by: ORTHOPAEDIC SURGERY

## 2025-07-23 RX ORDER — CELECOXIB 200 MG/1
200 CAPSULE ORAL 2 TIMES DAILY
COMMUNITY
Start: 2025-07-03

## 2025-07-23 RX ORDER — SERTRALINE HYDROCHLORIDE 50 MG/1
50 TABLET, FILM COATED ORAL DAILY
COMMUNITY

## 2025-07-23 NOTE — DISCHARGE INSTRUCTIONS
To confirm, Your doctor has instructed you that surgery is scheduled for: 8/4/25 WITH DR. COREA    Please report to [facility], Registration the morning of surgery. You must check-in and receive a wristband before going to your procedure.  [address] [place] [address] [place] [address]    Pre-Op will call the FRIDAY prior to surgery between 1:00 and 6:00 PM with the final arrival time.  Phone number: [phone]    PLEASE NOTE:  The surgery schedule has many variables which may affect the time of your surgery case.  Family members should be available if your surgery time changes.  Plan to be here the day of your procedure between 6-8 hours.    MEDICATIONS:  TAKE ONLY THESE MEDICATIONS WITH A SMALL SIP OF WATER THE MORNING OF YOUR PROCEDURE:  SEE MED LIST      DO NOT TAKE THESE MEDICATIONS 5-7 DAYS PRIOR to your procedure or per your surgeon's request:   ASPIRIN, ALEVE, ADVIL, IBUPROFEN, FISH OIL VITAMIN E, HERBALS  (May take Tylenol)    ONLY if you are prescribed any types of blood thinners such as:  Aspirin, Coumadin, Plavix, Pradaxa, Xarelto, Aggrenox, Effient, Eliquis, Savasya, Brilinta, or any other, ask your surgeon whether you should stop taking them and how long before surgery you should stop.  You may also need to verify with the prescribing physician if it is ok to stop your medication.      INSTRUCTIONS IMPORTANT!!  Do not eat or drink anything between midnight and the time of your procedure- this includes gum, mints, and candy.  EXCEPT: you may have clear liquids such as:  WATER, BLACK COFFEE, UNSWEET TEA, OR GATORADE (NO RED OR PURPLE) UP TO 2 HOURS PRIOR TO YOUR ARRIVAL TIME.  Do not smoke or drink alcoholic beverages 24 hours prior to your procedure.  Shower the night before AND the morning of your procedure with a Chlorhexidine wash such as Hibiclens or Dial antibacterial soap from the neck down.  Do not get it on your face or in your eyes.  You may use your own shampoo and face wash.  This helps your skin to be as bacteria free as possible.    If you wear contact lenses, dentures, hearing aids or glasses, bring a container to put them in during surgery and give to a family member for safe keeping.  Please leave all jewelry, piercing's and valuables at home. You must remove your false eyelashes prior to surgery.    DO NOT remove hair from the surgery site.  Do not shave the incision site unless you are given specific instructions to do so.    ONLY if you have been diagnosed with sleep apnea please bring your C-PAP machine.  ONLY if you wear home oxygen please bring your portable oxygen tank the day of your procedure.  ONLY if you have a history of OPEN HEART SURGERY you will need a clearance from your Cardiologist per Anesthesia.      ONLY for patients requiring bowel prep, written instructions will be given by your doctor's office.  ONLY if you have any type of stimulator implant or any type of implanted device with a remote control.  Please bring the controller with you the morning of surgery  If your doctor has scheduled you for an overnight stay, bring a small overnight bag with any personal items you need.  Make arrangements in advance for transportation home by a responsible adult. You can not go home in an uber or a cab per hospital policy.  It is not safe to drive a vehicle during the 24 hours after anesthesia.          All  facilities and properties are tobacco free.  Smoking is NOT allowed.   If you have any questions about these instructions, call Pre-Op Admit  Nursing at [phone] or the Pre-Op Day Surgery Unit at [phone].

## 2025-07-23 NOTE — PRE ADMISSION SCREENING
Patient Name: Courtney CURRAN [place]  YOB: 1960   MRN: [id number]     [facility]   Basic Mobility Inpatient Short Form 6 Clicks         How much difficulty does the patient currently have  Unable  A Lot  A Little  None      1. Turning over in bed (including adjusting bedclothes, sheets and blankets)?     1 []    2 []    3 []    4 [x]        2. Sitting down on and standing up from a chair with arms (e.g., wheelchair, bedside commode, etc.)     1 []  2 []  3 []     4 [x]      3. Moving from lying on back to sitting on the side of the bed?     1 []  2 []  3 []    4 [x]    How much help from another person does the patient currently need  Total  A Lot  A Little  None      4. Moving to and from a bed to a chair (including a wheelchair)?    1 []  2 []  3 []    4 [x]      5. Need to walk in hospital room?    1 []  2 []  3 []    4 [x]      6. Climbing 3-5 steps with a railing?    1 []  2 []  3 []    4 [x]       Raw Score:     24             CMS 0-100% Score:     0       %   Standardized Score:    61.14           CMS Modifier:       [facility]   Basic Mobility Inpatient Short Form 6 Clicks Score Conversion Table*         *Use this form to convert -PAC Basic Mobility Inpatient Raw Scores.   [facility] Inpatient Basic Mobility Short Form Scoring Example   1. Add the number values associated with the response to each item. For example, items totals yield a Raw Score of 21.   2. Match the raw score to the t-Scale scores (t-Scale score = 50.25, SE = 4.69).   3. Find the associated CMS % (CMS % = 28.97%).   4. Locate the correct CMS Functional Modifier Code, or G Code (G code = CJ)     NOTE: Each -PAC Short Form has a separate conversion table. Make sure that you use the correct conversion table.       Instruction Manual - page 45 contains conversion table

## 2025-07-23 NOTE — PRE ADMISSION SCREENING
"               [facility] Risk Assessment Scale    Patient Name: Courtney Reilly  YOB: 1960  MRN: [id number]            RIsk Factor Measure Recommendation Patient Data Scale/Score   BMI >40 Reconsider surgery, weight loss   Estimated body mass index is 22.56 kg/m² as calculated from the following:    Height as of 7/17/25: 5' 5" (1.651 m).    Weight as of 7/17/25: 61.5 kg (135 lb 9.3 oz).   [x] 0 = 1 - 24.9  [] 1 = 25-29.9  [] 2 = 30-34.9  [] 3 = 35-39.9  [] 4 = 40-44.9  [] 5 = 45-99.9   Hemoglobin AIC (if applicable) >9 Delay surgery until DM under control  Refer for:  Nutrition Therapy  Exercise   Medication    Lab Results   Component Value Date    HGBA1C 5.1 09/28/2023       Lab Results   Component Value Date    GLU 95 07/23/2025      [x] 0 = 4.0-5.6  [] 1 = 5.7-6.4  [] 2 = 6.5-6.9  [] 3 = 7.0-7.9  [] 4 = 8.0-8.9  [] 5 = 9.0-12   Hemoglobin (Anemia) <9 Delay surgery   Correct anemia Lab Results   Component Value Date    HGB 12.5 07/23/2025    [] 20 - <9.0                    Albumin <3 Delay surgery &Workup Lab Results   Component Value Date    ALBUMIN 4.0 07/23/2025    [] 20 - <3.0   Smoking Cessation >4 Weeks Delay Surgery  Refer to OP Cessation Class    Current Everyday Smoker [x] 20 - current smoker                                _0/5_ PPD                    Hx of MI, PE, Arrhythmia, CVA, DVT <30 Days Delay Surgery    N/A [] 20      Infection Variable Delay surgery and re-evaluate   N/A [] 20 - recent/current infection     Depression (PHQ) >10 out of 27 Delay Surgery and re-evaluate  Medication  Counseling              [x] 0     []1     []2     []3      []4      [] 5                    (1-4)      (5-9)  (10-14)  (15-19)   (20-27)     Memory Impairment & Memory loss (Mini-Cog Screening Tool) Advanced dementia and/or Parkinson's Reconsider surgery     [x] 0     []1     []2     []3     []4     [] 5     Physical Conditioning (Modified AM-PAC Per Physical Therapy at [facility]) Unable to ambulate on day of surgery " Delay surgery and re-evaluate  Pre-Rehabilitation   (PT evaluation)       [x]  0   []4       []8     []12        []16     []20       (<20%)   (<40%)   (<60%)   (<80% )    (>80%)     Home Environment/Caregiver support  (Per /Navigator Interview)    Availability of basic services and/or approprate assistance during post-operative period Delay surgery and re-evaluate  Safe home environment  Health   1 week post-surgery  Transportation  availability  Ability to obtain DME/Medications post-op    [x] 0     []1     []2     []3     []4     [] 5  [x] 0     []1     []2     []3     []4     [] 5  [x] 0     []1     []2     []3     []4     [] 5  [x] 0     []1     []2     []3     []4     [] 5         MD Contact: Dr. Franklin Comments:  Total Score:  20

## 2025-07-23 NOTE — PRE ADMISSION SCREENING
JOINT CAMP ASSESSMENT    Name Courtney Reilly   MRN [id number]    Age/Sex 65 y.o. female    Surgeon Dr. Knapp Finger   Joint Camp Date 7/23/2025   Surgery Date 8/4/2025   Procedure Left Knee Arthroplasty   Insurance Payor: [employer] MANAGED MEDICARE / Plan: [employer] MEDICARE HMO / Product Type: Capitation /    Care Team Patient Care Team:  Lalitha Mai MD as PCP - General (Internal Medicine)  Gemma Tamez as ED Navigator    Pharmacy   [facility] [place] - [address] [place]  [address] [place]  [place] [address]  Phone: [phone] Fax: [phone]    [facility] [address]  [address] [facility] [address]  Phone: [phone] Fax: [phone]     AM-PAC Score   24   Risk Assessment Score 20     Past Medical History:   Diagnosis Date    Arthritis     Chronic back pain     COPD (chronic obstructive pulmonary disease)     Encounter for blood transfusion     Fatigue     Fibromyalgia     Full dentures     GERD (gastroesophageal reflux disease)     Herniated disc     Hypertension     Multiple sclerosis     On home O2     3 liters as needed    Ovarian tumor     Radiculopathy     Restless legs syndrome (RLS)     Spasticity     Stroke     twice    TIA (transient ischemic attack)        Past Surgical History:   Procedure Laterality Date    BREAST BIOPSY      CEREBRAL ANEURYSM REPAIR      HYSTERECTOMY      age 27    KNEE SURGERY  01/01/2008    left    pain pump implant      NC REMOVAL OF OVARY/TUBE(S)      ROBOT-ASSISTED LAPAROSCOPIC REPAIR OF VENTRAL HERNIA Left 02/15/2022    Procedure: ROBOTIC REPAIR, HERNIA, INCISIONAL - LEFT FLANK;  Surgeon: Sixto Clark Jr., MD;  Location: [facility];  Service: General;  Laterality: Left;    SPINE SURGERY  01/01/2006      2 on neck.  2 on lumbar spine    TONSILLECTOMY      WRIST SURGERY  01/01/2007    right         Home Enviroment     Living Arrangement: Lives alone  Home Environment: 1-story house/ trailer,  number of outside stairs: 4 with a railing, walk-in shower  Home Safety Concerns: Pets in the home: cats (4).    DISCHARGE CAREGIVER/SUPPORT SYSTEM     Identified post-op caregiver: Patient has lives alone and children / family / friends to help, Chandrakant Wilkins.  Patient's caregiver(s) will be able to provide physical assistance. Patient will have someone to assist overnight.      Caregiver present at pre-op interview:  No      PRE-OPERATIVE FUNCTIONAL STATUS     Employment: Disabled    Pre-op Functional Status: Patient is independent with mobility/ambulation, transfers, ADL's, IADL's.    Use of assistive device for ambulation: none  ADL: self care  ADL Limitations: difficulty with walking  Medical Restrictions: Unstable ambulation and Decreased range of motions in extremities    POTENTIAL BARRIERS TO DISCHARGE/POTENTIAL POST-OP COMPLICATIONS     Patient is a current everyday smoker which could delay wound healing. Patient with hx of chronic back pain, previous blood transfusion, fibromyalgia, RLS, stroke x 2, TIA. POSSIBLE SAME DAY DISCHARGE.     DISCHARGE PLAN     Expected LOS of 1 days or less for joint replacement discussed with patient. We also discussed a discharge path of HH for approximately two weeks with a transition to outpatient PT on the third week given no post-op complications.      Patient in agreement with discharge plan: Yes    Discharge to: Discharge home with home health (PT/OT) x2 weeks with transition to outpatient PT     HH:  [facility] ([place]).  Patient disclosure form completed and sent to case management for upload to the medical record.      OP PT: [facility] ([place])     Home DME: rolling walker and bedside commode    Needed DME at D/C: none     Rx: Per Dr. Franklin at discharge     Meds to Beds: Yes  Patient expected to discharge on Aspirin 81mg by mouth twice daily for DVT prophylaxis.

## 2025-07-25 ENCOUNTER — TELEPHONE (OUTPATIENT)
Dept: PULMONOLOGY | Facility: CLINIC | Age: 65
End: 2025-07-25
Payer: MEDICARE

## 2025-07-25 NOTE — TELEPHONE ENCOUNTER
Received clearance for surgery request form through the portal, printed out and put in dr bernard's folder  
No indicators present

## 2025-07-29 ENCOUNTER — TELEPHONE (OUTPATIENT)
Dept: PULMONOLOGY | Facility: CLINIC | Age: 65
End: 2025-07-29
Payer: MEDICARE

## 2025-07-31 DIAGNOSIS — M17.12 PRIMARY OSTEOARTHRITIS OF LEFT KNEE: Primary | ICD-10-CM

## 2025-07-31 RX ORDER — ASPIRIN 81 MG/1
81 TABLET ORAL EVERY 12 HOURS
Qty: 60 TABLET | Refills: 0 | Status: SHIPPED | OUTPATIENT
Start: 2025-07-31 | End: 2025-08-30

## 2025-07-31 RX ORDER — GABAPENTIN 300 MG/1
300 CAPSULE ORAL 2 TIMES DAILY
Qty: 60 CAPSULE | Refills: 0 | Status: SHIPPED | OUTPATIENT
Start: 2025-07-31 | End: 2025-08-30

## 2025-07-31 RX ORDER — ONDANSETRON 4 MG/1
4 TABLET, FILM COATED ORAL EVERY 8 HOURS PRN
Qty: 30 TABLET | Refills: 0 | Status: SHIPPED | OUTPATIENT
Start: 2025-07-31

## 2025-07-31 RX ORDER — DOCUSATE SODIUM 100 MG/1
100 CAPSULE, LIQUID FILLED ORAL 2 TIMES DAILY
Qty: 60 CAPSULE | Refills: 0 | Status: SHIPPED | OUTPATIENT
Start: 2025-07-31 | End: 2025-08-31

## 2025-07-31 RX ORDER — CELECOXIB 200 MG/1
200 CAPSULE ORAL 2 TIMES DAILY
Qty: 60 CAPSULE | Refills: 0 | Status: SHIPPED | OUTPATIENT
Start: 2025-07-31 | End: 2025-08-30

## 2025-08-01 ENCOUNTER — ANESTHESIA EVENT (OUTPATIENT)
Dept: SURGERY | Facility: HOSPITAL | Age: 65
End: 2025-08-01
Payer: MEDICARE

## 2025-08-04 ENCOUNTER — ANESTHESIA (OUTPATIENT)
Dept: SURGERY | Facility: HOSPITAL | Age: 65
End: 2025-08-04
Payer: MEDICARE

## 2025-08-04 ENCOUNTER — HOSPITAL ENCOUNTER (OUTPATIENT)
Facility: HOSPITAL | Age: 65
Discharge: HOME | End: 2025-08-04
Attending: ORTHOPAEDIC SURGERY | Admitting: ORTHOPAEDIC SURGERY
Payer: MEDICARE

## 2025-08-04 DIAGNOSIS — Z01.818 PRE-OP TESTING: ICD-10-CM

## 2025-08-04 DIAGNOSIS — M17.12 PRIMARY OSTEOARTHRITIS OF LEFT KNEE: Primary | ICD-10-CM

## 2025-08-04 PROCEDURE — 63600175 PHARM REV CODE 636 W HCPCS: Performed by: ORTHOPAEDIC SURGERY

## 2025-08-04 PROCEDURE — 94799 UNLISTED PULMONARY SVC/PX: CPT

## 2025-08-04 PROCEDURE — 25000003 PHARM REV CODE 250: Performed by: ANESTHESIOLOGY

## 2025-08-04 PROCEDURE — 71000016 HC POSTOP RECOV ADDL HR: Performed by: ORTHOPAEDIC SURGERY

## 2025-08-04 PROCEDURE — 0055T BONE SRGRY CMPTR CT/MRI IMAG: CPT | Mod: ,,, | Performed by: ORTHOPAEDIC SURGERY

## 2025-08-04 PROCEDURE — 27447 TOTAL KNEE ARTHROPLASTY: CPT | Mod: LT,,, | Performed by: ORTHOPAEDIC SURGERY

## 2025-08-04 PROCEDURE — 97116 GAIT TRAINING THERAPY: CPT

## 2025-08-04 PROCEDURE — 71000015 HC POSTOP RECOV 1ST HR: Performed by: ORTHOPAEDIC SURGERY

## 2025-08-04 PROCEDURE — 63600175 PHARM REV CODE 636 W HCPCS: Mod: UD | Performed by: NURSE ANESTHETIST, CERTIFIED REGISTERED

## 2025-08-04 PROCEDURE — 36000713 HC OR TIME LEV V EA ADD 15 MIN: Performed by: ORTHOPAEDIC SURGERY

## 2025-08-04 PROCEDURE — 64447 NJX AA&/STRD FEMORAL NRV IMG: CPT | Performed by: ANESTHESIOLOGY

## 2025-08-04 PROCEDURE — 99406 BEHAV CHNG SMOKING 3-10 MIN: CPT

## 2025-08-04 PROCEDURE — C1769 GUIDE WIRE: HCPCS | Performed by: ORTHOPAEDIC SURGERY

## 2025-08-04 PROCEDURE — 71000033 HC RECOVERY, INTIAL HOUR: Performed by: ORTHOPAEDIC SURGERY

## 2025-08-04 PROCEDURE — 99900035 HC TECH TIME PER 15 MIN (STAT)

## 2025-08-04 PROCEDURE — 37000008 HC ANESTHESIA 1ST 15 MINUTES: Performed by: ORTHOPAEDIC SURGERY

## 2025-08-04 PROCEDURE — C1776 JOINT DEVICE (IMPLANTABLE): HCPCS | Performed by: ORTHOPAEDIC SURGERY

## 2025-08-04 PROCEDURE — 27201423 OPTIME MED/SURG SUP & DEVICES STERILE SUPPLY: Performed by: ORTHOPAEDIC SURGERY

## 2025-08-04 PROCEDURE — 63600175 PHARM REV CODE 636 W HCPCS: Mod: UD | Performed by: ANESTHESIOLOGY

## 2025-08-04 PROCEDURE — 25000003 PHARM REV CODE 250: Performed by: ORTHOPAEDIC SURGERY

## 2025-08-04 PROCEDURE — 25000003 PHARM REV CODE 250: Performed by: NURSE ANESTHETIST, CERTIFIED REGISTERED

## 2025-08-04 PROCEDURE — C1713 ANCHOR/SCREW BN/BN,TIS/BN: HCPCS | Performed by: ORTHOPAEDIC SURGERY

## 2025-08-04 PROCEDURE — 37000009 HC ANESTHESIA EA ADD 15 MINS: Performed by: ORTHOPAEDIC SURGERY

## 2025-08-04 PROCEDURE — 63600175 PHARM REV CODE 636 W HCPCS: Mod: UD

## 2025-08-04 PROCEDURE — 63600175 PHARM REV CODE 636 W HCPCS: Mod: JZ,TB,UD | Performed by: ANESTHESIOLOGY

## 2025-08-04 PROCEDURE — 71000039 HC RECOVERY, EACH ADD'L HOUR: Performed by: ORTHOPAEDIC SURGERY

## 2025-08-04 PROCEDURE — 36000712 HC OR TIME LEV V 1ST 15 MIN: Performed by: ORTHOPAEDIC SURGERY

## 2025-08-04 PROCEDURE — 97161 PT EVAL LOW COMPLEX 20 MIN: CPT

## 2025-08-04 DEVICE — TIBIAL BEARING INSERT - CS
Type: IMPLANTABLE DEVICE | Site: KNEE | Status: FUNCTIONAL
Brand: TRIATHLON

## 2025-08-04 DEVICE — CRUCIATE RETAINING FEMORAL
Type: IMPLANTABLE DEVICE | Site: KNEE | Status: FUNCTIONAL
Brand: TRIATHLON

## 2025-08-04 DEVICE — SYMMETRIC PATELLA
Type: IMPLANTABLE DEVICE | Site: KNEE | Status: FUNCTIONAL
Brand: TRIATHLON

## 2025-08-04 DEVICE — PRIMARY TIBIAL BASEPLATE
Type: IMPLANTABLE DEVICE | Site: KNEE | Status: FUNCTIONAL
Brand: TRIATHLON

## 2025-08-04 DEVICE — CEMENT BONE SIMPLEX HV RADPQ: Type: IMPLANTABLE DEVICE | Site: KNEE | Status: FUNCTIONAL

## 2025-08-04 RX ORDER — CEFAZOLIN 2 G/1
2 INJECTION, POWDER, FOR SOLUTION INTRAMUSCULAR; INTRAVENOUS
Status: CANCELLED | OUTPATIENT
Start: 2025-08-04 | End: 2025-08-05

## 2025-08-04 RX ORDER — MORPHINE SULFATE 2 MG/ML
2 INJECTION, SOLUTION INTRAMUSCULAR; INTRAVENOUS EVERY 4 HOURS PRN
Refills: 0 | Status: CANCELLED | OUTPATIENT
Start: 2025-08-04

## 2025-08-04 RX ORDER — SODIUM CHLORIDE 0.9 % (FLUSH) 0.9 %
5 SYRINGE (ML) INJECTION
Status: DISCONTINUED | OUTPATIENT
Start: 2025-08-04 | End: 2025-08-04 | Stop reason: HOSPADM

## 2025-08-04 RX ORDER — DEXAMETHASONE SODIUM PHOSPHATE 4 MG/ML
INJECTION, SOLUTION INTRA-ARTICULAR; INTRALESIONAL; INTRAMUSCULAR; INTRAVENOUS; SOFT TISSUE
Status: DISCONTINUED | OUTPATIENT
Start: 2025-08-04 | End: 2025-08-04

## 2025-08-04 RX ORDER — POLYETHYLENE GLYCOL 3350 17 G/17G
17 POWDER, FOR SOLUTION ORAL DAILY
Status: CANCELLED | OUTPATIENT
Start: 2025-08-04

## 2025-08-04 RX ORDER — ZOLPIDEM TARTRATE 5 MG/1
5 TABLET ORAL NIGHTLY PRN
Status: CANCELLED | OUTPATIENT
Start: 2025-08-04

## 2025-08-04 RX ORDER — FENTANYL CITRATE 50 UG/ML
25-200 INJECTION, SOLUTION INTRAMUSCULAR; INTRAVENOUS
Status: DISCONTINUED | OUTPATIENT
Start: 2025-08-04 | End: 2025-08-04 | Stop reason: HOSPADM

## 2025-08-04 RX ORDER — FAMOTIDINE 20 MG/1
20 TABLET, FILM COATED ORAL 2 TIMES DAILY
Status: CANCELLED | OUTPATIENT
Start: 2025-08-04

## 2025-08-04 RX ORDER — OXYCODONE HYDROCHLORIDE 10 MG/1
10 TABLET ORAL EVERY 4 HOURS PRN
Refills: 0 | Status: CANCELLED | OUTPATIENT
Start: 2025-08-04

## 2025-08-04 RX ORDER — BUPIVACAINE HYDROCHLORIDE 5 MG/ML
INJECTION, SOLUTION EPIDURAL; INTRACAUDAL; PERINEURAL
Status: COMPLETED | OUTPATIENT
Start: 2025-08-04 | End: 2025-08-04

## 2025-08-04 RX ORDER — OXYCODONE HCL 10 MG/1
10 TABLET, FILM COATED, EXTENDED RELEASE ORAL ONCE
Status: COMPLETED | OUTPATIENT
Start: 2025-08-04 | End: 2025-08-04

## 2025-08-04 RX ORDER — ONDANSETRON HYDROCHLORIDE 2 MG/ML
INJECTION, SOLUTION INTRAMUSCULAR; INTRAVENOUS
Status: DISCONTINUED | OUTPATIENT
Start: 2025-08-04 | End: 2025-08-04

## 2025-08-04 RX ORDER — OXYCODONE HYDROCHLORIDE 5 MG/1
5 TABLET ORAL ONCE AS NEEDED
Status: COMPLETED | OUTPATIENT
Start: 2025-08-04 | End: 2025-08-04

## 2025-08-04 RX ORDER — CEFAZOLIN 2 G/1
2 INJECTION, POWDER, FOR SOLUTION INTRAMUSCULAR; INTRAVENOUS
Status: COMPLETED | OUTPATIENT
Start: 2025-08-04 | End: 2025-08-04

## 2025-08-04 RX ORDER — SUMATRIPTAN SUCCINATE 50 MG/1
50 TABLET ORAL ONCE
Status: COMPLETED | OUTPATIENT
Start: 2025-08-04 | End: 2025-08-04

## 2025-08-04 RX ORDER — LIDOCAINE HYDROCHLORIDE 20 MG/ML
INJECTION INTRAVENOUS
Status: DISCONTINUED | OUTPATIENT
Start: 2025-08-04 | End: 2025-08-04

## 2025-08-04 RX ORDER — FENTANYL CITRATE 50 UG/ML
INJECTION, SOLUTION INTRAMUSCULAR; INTRAVENOUS
Status: DISCONTINUED | OUTPATIENT
Start: 2025-08-04 | End: 2025-08-04

## 2025-08-04 RX ORDER — MIDAZOLAM HYDROCHLORIDE 1 MG/ML
2 INJECTION, SOLUTION INTRAMUSCULAR; INTRAVENOUS
Status: DISCONTINUED | OUTPATIENT
Start: 2025-08-04 | End: 2025-08-04 | Stop reason: HOSPADM

## 2025-08-04 RX ORDER — FENTANYL CITRATE 50 UG/ML
25 INJECTION, SOLUTION INTRAMUSCULAR; INTRAVENOUS EVERY 5 MIN PRN
Status: COMPLETED | OUTPATIENT
Start: 2025-08-04 | End: 2025-08-04

## 2025-08-04 RX ORDER — BUPIVACAINE 13.3 MG/ML
INJECTION, SUSPENSION, LIPOSOMAL INFILTRATION
Status: DISCONTINUED | OUTPATIENT
Start: 2025-08-04 | End: 2025-08-04

## 2025-08-04 RX ORDER — ACETAMINOPHEN 500 MG
1000 TABLET ORAL
Status: COMPLETED | OUTPATIENT
Start: 2025-08-04 | End: 2025-08-04

## 2025-08-04 RX ORDER — SUMATRIPTAN SUCCINATE 50 MG/1
100 TABLET ORAL ONCE
Status: COMPLETED | OUTPATIENT
Start: 2025-08-04 | End: 2025-08-04

## 2025-08-04 RX ORDER — TRANEXAMIC ACID 1 G/10ML
INJECTION, SOLUTION INTRAVENOUS
Status: DISCONTINUED | OUTPATIENT
Start: 2025-08-04 | End: 2025-08-04

## 2025-08-04 RX ORDER — MIDAZOLAM HYDROCHLORIDE 2 MG/2ML
INJECTION, SOLUTION INTRAMUSCULAR; INTRAVENOUS
Status: DISCONTINUED | OUTPATIENT
Start: 2025-08-04 | End: 2025-08-04

## 2025-08-04 RX ORDER — ONDANSETRON 4 MG/1
8 TABLET, ORALLY DISINTEGRATING ORAL EVERY 8 HOURS PRN
Status: CANCELLED | OUTPATIENT
Start: 2025-08-04

## 2025-08-04 RX ORDER — PROPOFOL 10 MG/ML
VIAL (ML) INTRAVENOUS
Status: DISCONTINUED | OUTPATIENT
Start: 2025-08-04 | End: 2025-08-04

## 2025-08-04 RX ORDER — LIDOCAINE HYDROCHLORIDE 10 MG/ML
1 INJECTION, SOLUTION EPIDURAL; INFILTRATION; INTRACAUDAL; PERINEURAL ONCE
Status: DISCONTINUED | OUTPATIENT
Start: 2025-08-04 | End: 2025-08-04 | Stop reason: HOSPADM

## 2025-08-04 RX ORDER — BISACODYL 10 MG/1
10 SUPPOSITORY RECTAL DAILY
Status: CANCELLED | OUTPATIENT
Start: 2025-08-04

## 2025-08-04 RX ORDER — CELECOXIB 100 MG/1
400 CAPSULE ORAL
Status: COMPLETED | OUTPATIENT
Start: 2025-08-04 | End: 2025-08-04

## 2025-08-04 RX ORDER — ONDANSETRON HYDROCHLORIDE 2 MG/ML
4 INJECTION, SOLUTION INTRAVENOUS ONCE AS NEEDED
Status: DISCONTINUED | OUTPATIENT
Start: 2025-08-04 | End: 2025-08-04 | Stop reason: HOSPADM

## 2025-08-04 RX ORDER — CELECOXIB 100 MG/1
200 CAPSULE ORAL 2 TIMES DAILY
Status: CANCELLED | OUTPATIENT
Start: 2025-08-04

## 2025-08-04 RX ORDER — EPHEDRINE SULFATE 50 MG/ML
INJECTION, SOLUTION INTRAVENOUS
Status: DISCONTINUED | OUTPATIENT
Start: 2025-08-04 | End: 2025-08-04

## 2025-08-04 RX ADMIN — EPHEDRINE SULFATE 10 MG: 50 INJECTION, SOLUTION INTRAMUSCULAR; INTRAVENOUS; SUBCUTANEOUS at 11:08

## 2025-08-04 RX ADMIN — MIDAZOLAM HYDROCHLORIDE 2 MG: 1 INJECTION, SOLUTION INTRAMUSCULAR; INTRAVENOUS at 10:08

## 2025-08-04 RX ADMIN — LIDOCAINE HYDROCHLORIDE 80 MG: 20 INJECTION, SOLUTION INTRAVENOUS at 11:08

## 2025-08-04 RX ADMIN — EPHEDRINE SULFATE 10 MG: 50 INJECTION, SOLUTION INTRAMUSCULAR; INTRAVENOUS; SUBCUTANEOUS at 12:08

## 2025-08-04 RX ADMIN — FENTANYL CITRATE 50 MCG: 0.05 INJECTION, SOLUTION INTRAMUSCULAR; INTRAVENOUS at 12:08

## 2025-08-04 RX ADMIN — FENTANYL CITRATE 25 MCG: 50 INJECTION INTRAMUSCULAR; INTRAVENOUS at 01:08

## 2025-08-04 RX ADMIN — OXYCODONE HYDROCHLORIDE 10 MG: 10 TABLET, FILM COATED, EXTENDED RELEASE ORAL at 09:08

## 2025-08-04 RX ADMIN — BUPIVACAINE 20 ML: 13.3 INJECTION, SUSPENSION, LIPOSOMAL INFILTRATION at 11:08

## 2025-08-04 RX ADMIN — SODIUM CHLORIDE, SODIUM GLUCONATE, SODIUM ACETATE, POTASSIUM CHLORIDE AND MAGNESIUM CHLORIDE: 526; 502; 368; 37; 30 INJECTION, SOLUTION INTRAVENOUS at 09:08

## 2025-08-04 RX ADMIN — FENTANYL CITRATE 50 MCG: 0.05 INJECTION, SOLUTION INTRAMUSCULAR; INTRAVENOUS at 11:08

## 2025-08-04 RX ADMIN — ACETAMINOPHEN 1000 MG: 500 TABLET ORAL at 09:08

## 2025-08-04 RX ADMIN — DEXAMETHASONE SODIUM PHOSPHATE 8 MG: 4 INJECTION, SOLUTION INTRA-ARTICULAR; INTRALESIONAL; INTRAMUSCULAR; INTRAVENOUS; SOFT TISSUE at 11:08

## 2025-08-04 RX ADMIN — PROPOFOL 100 MG: 10 INJECTION, EMULSION INTRAVENOUS at 11:08

## 2025-08-04 RX ADMIN — SUMATRIPTAN SUCCINATE 100 MG: 50 TABLET ORAL at 01:08

## 2025-08-04 RX ADMIN — TRANEXAMIC ACID 600 MG: 100 INJECTION, SOLUTION INTRAVENOUS at 11:08

## 2025-08-04 RX ADMIN — SUMATRIPTAN SUCCINATE 50 MG: 50 TABLET ORAL at 04:08

## 2025-08-04 RX ADMIN — SODIUM CHLORIDE, SODIUM GLUCONATE, SODIUM ACETATE, POTASSIUM CHLORIDE AND MAGNESIUM CHLORIDE: 526; 502; 368; 37; 30 INJECTION, SOLUTION INTRAVENOUS at 12:08

## 2025-08-04 RX ADMIN — FENTANYL CITRATE 100 MCG: 50 INJECTION, SOLUTION INTRAMUSCULAR; INTRAVENOUS at 10:08

## 2025-08-04 RX ADMIN — CELECOXIB 400 MG: 100 CAPSULE ORAL at 09:08

## 2025-08-04 RX ADMIN — OXYCODONE HYDROCHLORIDE 5 MG: 5 TABLET ORAL at 01:08

## 2025-08-04 RX ADMIN — BUPIVACAINE HYDROCHLORIDE 10 ML: 5 INJECTION, SOLUTION EPIDURAL; INTRACAUDAL; PERINEURAL at 11:08

## 2025-08-04 RX ADMIN — ONDANSETRON 4 MG: 2 INJECTION INTRAMUSCULAR; INTRAVENOUS at 11:08

## 2025-08-04 RX ADMIN — ROPIVACAINE HYDROCHLORIDE: 5 INJECTION, SOLUTION EPIDURAL; INFILTRATION; PERINEURAL at 11:08

## 2025-08-04 RX ADMIN — CEFAZOLIN 2 G: 2 INJECTION, POWDER, FOR SOLUTION INTRAMUSCULAR; INTRAVENOUS at 11:08

## 2025-08-04 NOTE — DISCHARGE INSTRUCTIONS
"Discharge Instructions: After Your Surgery/Procedure  Youve just had surgery. During surgery you were given medicine called anesthesia to keep you relaxed and free of pain. After surgery you may have some pain or nausea. This is common. Here are some tips for feeling better and getting well after surgery.     Stay on schedule with your medication.   Going home  Your doctor or nurse will show you how to take care of yourself when you go home. He or she will also answer your questions. Have an adult family member or friend drive you home.      For your safety we recommend these precaution for the first 24 hours after your procedure:  Do not drive or use heavy equipment.  Do not make important decisions or sign legal papers.  Do not drink alcohol.  Have someone stay with you, if needed. He or she can watch for problems and help keep you safe.  Your concentration, balance, coordination, and judgement may be impaired for many hours after anesthesia.  Use caution when ambulating or standing up.     You may feel weak and "washed out" after anesthesia and surgery.      Subtle residual effects of general anesthesia or sedation with regional / local anesthesia can last more than 24 hours.  Rest for the remainder of the day or longer if your Doctor/Surgeon has advised you to do so.  Although you may feel normal within the first 24 hours, your reflexes and mental ability may be impaired without you realizing it.  You may feel dizzy, lightheaded or sleepy for 24 hours or longer.      Be sure to go to all follow-up visits with your doctor. And rest after your surgery for as long as your doctor tells you to.  Coping with pain  If you have pain after surgery, pain medicine will help you feel better. Take it as told, before pain becomes severe. Also, ask your doctor or pharmacist about other ways to control pain. This might be with heat, ice, or relaxation. And follow any other instructions your surgeon or nurse gives you.  Tips " for taking pain medicine  To get the best relief possible, remember these points:  Pain medicines can upset your stomach. Taking them with a little food may help.  Most pain relievers taken by mouth need at least 20 to 30 minutes to start to work.  Taking medicine on a schedule can help you remember to take it. Try to time your medicine so that you can take it before starting an activity. This might be before you get dressed, go for a walk, or sit down for dinner.  Constipation is a common side effect of pain medicines. Call your doctor before taking any medicines such as laxatives or stool softeners to help ease constipation. Also ask if you should skip any foods. Drinking lots of fluids and eating foods such as fruits and vegetables that are high in fiber can also help. Remember, do not take laxatives unless your surgeon has prescribed them.  Drinking alcohol and taking pain medicine can cause dizziness and slow your breathing. It can even be deadly. Do not drink alcohol while taking pain medicine.  Pain medicine can make you react more slowly to things. Do not drive or run machinery while taking pain medicine.  Your health care provider may tell you to take acetaminophen to help ease your pain. Ask him or her how much you are supposed to take each day. Acetaminophen or other pain relievers may interact with your prescription medicines or other over-the-counter (OTC) drugs. Some prescription medicines have acetaminophen and other ingredients. Using both prescription and OTC acetaminophen for pain can cause you to overdose. Read the labels on your OTC medicines with care. This will help you to clearly know the list of ingredients, how much to take, and any warnings. It may also help you not take too much acetaminophen. If you have questions or do not understand the information, ask your pharmacist or health care provider to explain it to you before you take the OTC medicine.  Managing nausea  Some people have an  upset stomach after surgery. This is often because of anesthesia, pain, or pain medicine, or the stress of surgery. These tips will help you handle nausea and eat healthy foods as you get better. If you were on a special food plan before surgery, ask your doctor if you should follow it while you get better. These tips may help:  Do not push yourself to eat. Your body will tell you when to eat and how much.  Start off with clear liquids and soup. They are easier to digest.  Next try semi-solid foods, such as mashed potatoes, applesauce, and gelatin, as you feel ready.  Slowly move to solid foods. Dont eat fatty, rich, or spicy foods at first.  Do not force yourself to have 3 large meals a day. Instead eat smaller amounts more often.  Take pain medicines with a small amount of solid food, such as crackers or toast, to avoid nausea.     Call your surgeon if  You still have pain an hour after taking medicine. The medicine may not be strong enough.  You feel too sleepy, dizzy, or groggy. The medicine may be too strong.  You have side effects like nausea, vomiting, or skin changes, such as rash, itching, or hives.       If you have obstructive sleep apnea  You were given anesthesia medicine during surgery to keep you comfortable and free of pain. After surgery, you may have more apnea spells because of this medicine and other medicines you were given. The spells may last longer than usual.   At home:  Keep using the continuous positive airway pressure (CPAP) device when you sleep. Unless your health care provider tells you not to, use it when you sleep, day or night. CPAP is a common device used to treat obstructive sleep apnea.  Talk with your provider before taking any pain medicine, muscle relaxants, or sedatives. Your provider will tell you about the possible dangers of taking these medicines.  © 6141-8249 [employer]. [address] [place] [address], [place] [address]. All rights reserved. This information is  not intended as a substitute for professional medical care. Always follow your healthcare professional's instructions.          Using an Incentive Spirometer    An incentive spirometer is a device that helps you do deep breathing exercises. These exercises expand your lungs, aid in circulation, and help prevent pneumonia. Deep breathing exercises also help you breathe better and improve the function of your lungs by:  Keeping your lungs clear  Strengthening your breathing muscles  Helping prevent respiratory complications or problems  The incentive spirometer gives you a way to take an active part in recover. A nurse or therapist will teach you breathing exercises. To do these exercises, you will breathe in through your mouth and not your nose. The incentive spirometer only works correctly if you breathe in through your mouth.  Steps to clear lungs  Step 1. Exhale normally. Then, inhale normally.  Relax and breathe out.  Step 2. Place your lips tightly around the mouthpiece.  Make sure the device is upright and not tilted.  Step 3. Inhale as much air as you can through the mouthpiece (don't breath through your nose).  Inhale slowly and deeply.  Hold your breath long enough to keep the balls or disk raised for at least 3 to 5 seconds, or as instructed by your healthcare provider.  Some spirometers have an indicator to let you know that you are breathing in too fast. If the indicator goes off, breathe in more slowly.  Step 4. Repeat the exercise regularly.  Do this exercise every hour while you're awake, or as instructed by your healthcare provider.  If you were taught deep breathing and coughing exercises, do them regularly as instructed by your healthcare provider.           Post op instructions for prevention of DVT  What is deep vein thrombosis?  Deep vein thrombosis (DVT) is the medical term for blood clots in the deep veins of the leg.  These blood clots can be dangerous.  A DVT can block a blood vessel and keep  blood from getting where it needs to go.  Another problem is that the clot can travel to other parts of the body such as the lungs.  A clot that travels to the lungs is called a pulmonary embolus (PE) and can cause serious problems with breathing which can lead to death.  Am I at risk for DVT/PE?  If you are not very active, you are at risk of DVT.  Anyone confined to bed, sitting for long periods of time, recovering from surgery, etc. increases the risk of DVT.  Other risk factors are cancer diagnosis, certain medications, estrogen replacement in any form,older age, obesity, pregnancy, smoking, history of clotting disorders, and dehydration.  How will I know if I have a DVT?  Swelling in the lower leg  Pain  Warmth, redness, hardness or bulging of the vein  If you have any of these symptoms, call your doctors office right away.  Some people will not have any symptoms until the clot moves to the lungs.  What are the symptoms of a PE?  Panting, shortness of breath, or trouble breathing  Sharp, knife-like chest pain when you breathe  Coughing or coughing up blood  Rapid heartbeat  If you have any of these symptoms or get worse quickly, call 911 for emergency treatment.  How can I prevent a DVT?  Avoid long periods of inactivity and dont cross your legs--get up and walk around every hour or so.  Stay active--walking after surgery is highly encouraged.  This means you should get out of the house and walk in the neighborhood.  Going up and down stairs will not impair healing (unless advised against such activity by your doctor).    Drink plenty of noncaffeinated, nonalcoholic fluids each day to prevent dehydration.  Wear special support stockings, if they have been advised by your doctor.  If you travel, stop at least once an hour and walk around.  Avoid smoking (assistance with stopping is available through your healthcare provider)  Always notify your doctor if you are not able to follow the post operative  instructions that are given to you at the time of discharge.  It may be necessary to prescribe one of the medications available to prevent DVT.        Exparel(bupivacaine) has been injected to provide approximately 72 hours of reduced pain after your surgery.  Do not remove the bracelet for five days.  Report to your doctor as soon as possible if you experience any of the following:   Restlessness   Anxiety   Speech problems    Lightheadedness   Numbness and tingling of the mouth and lips   Seizures    Metallic taste   Blurred vision   Tremors    Twitching   Depression   Extreme drowsiness  Avoid additional use of local anesthetics (such as dental procedures) for five days (96 hours).          We hope your stay was comfortable as you heal now, mend and rest.    For we have enjoyed taking care of you by giving your our best.    And as you get better, by regaining your health and strength;   We count it as a privilege to have served you and hope your time at [facility] was well spent.      Thank  You!!!

## 2025-08-04 NOTE — PLAN OF CARE
AAO x4. NAD. VSS. Calm and cooperative. Speech appropriate. Tolerating clear liquids. Denies nausea. Pain controlled. 20G IV to Rt hand infusing with dressing CDI. Dressing to Lt knee CDI. Ice applied. Xray taken and verified by Dr. Franklin. Family notified of patient status. All safety measures taken. Bed in low position. Bedrails up x2. Bed locked. All patient belongings in post op. Cleared by anesthesia for phase 2.

## 2025-08-04 NOTE — CARE UPDATE
08/04/25 0857   Incentive Spirometer   $ Incentive Spirometer Charges done with encouragement;preop instruction   Incentive Spirometer Predicted Level (mL) 1640   Administration (IS) instruction provided, initial;proper technique demonstrated   Number of Repetitions (IS) 5   Level Incentive Spirometer (mL) 2000   Patient Tolerance (IS) good   Tobacco Cessation Intervention   Do you use any type of tobacco product? Yes   Are you interested in quitting use of tobacco products? Not interested   Are you interested in Nicotine Replacement for symptom relief? Yes   $ Smoking Cessation Charges Smoking Cessation - Intermediate (Non-CTTS)   Respiratory Evaluation   $ Care Plan Tech Time 15 min   $ Respiratory Evaluation Complete   Evaluation For New Orders   Admitting Diagnosis left knee   Cardiac Diagnosis htn   Pulmonary Diagnosis copd   Home Oxygen   Has Home Oxygen? Yes   Liter Flow 3   Duration as needed;continuous   Route nasal cannula   Mode continuous   Device home concentrator with portable unit   Home Aerosol, MDI, DPI, and Other Treatments/Therapies   Home Respiratory Therapy Per Patient/Review of Chart Yes   Aerosol Home Meds/Freq PRN   MDI Home Meds/Freq PRN   Oxygen Care Plan   Oxygen Care Plan Per Protocol   SPO2 Goal (%) 92% non-cardiac   Rationale SpO2 is <92% (Non-cardiac Pt.);Maintain Home oxygen   Bronchodilator Care Plan   Bronchodilator Care Plan Aerosol   Aerosol Meds w/ frequency Ventolin/Proventil(Albuterol Sulfate) 0.1-0.15mg/kg PRN   Rationale Maintain home respiratory medicine   Atelectasis Care Plan   Atelectasis Care Plan Incentive Spiromentry   Frequency TID   I.S. Goal (ml) 1640 ml   I.S. Minimum (ml) 820 ml   Rationale Post-op abdominal   Other Atelectasis na   Airway Clearance Care Plan   Rationale No rationale found

## 2025-08-04 NOTE — ANESTHESIA PREPROCEDURE EVALUATION
08/04/2025  Courtney Reilly is a 65 y.o., female.      Pre-op Assessment          Review of Systems  Cardiovascular:     Hypertension                                    Hypertension         Pulmonary:   COPD Asthma       Asthma:   Chronic Obstructive Pulmonary Disease (COPD):                      Hepatic/GI:     GERD         Gerd          Neurological:  TIA, CVA Neuromuscular Disease,                      CVA - Cerebrovasular Accident     TIA - Transient Ischemic Attack             Neuromuscular Disease   Psych:  Psychiatric History                  Physical Exam  General: Well nourished        Anesthesia Plan  Type of Anesthesia, risks & benefits discussed:    Anesthesia Type: Gen Supraglottic Airway  Intra-op Monitoring Plan: Standard ASA Monitors  Post Op Pain Control Plan: multimodal analgesia, IV/PO Opioids PRN and peripheral nerve block  Induction:  IV  Informed Consent: Informed consent signed with the Patient and all parties understand the risks and agree with anesthesia plan.  All questions answered.   ASA Score: 3  Day of Surgery Review of History & Physical: H&P Update referred to the surgeon/provider.    Ready For Surgery From Anesthesia Perspective.     .

## 2025-08-04 NOTE — ANESTHESIA POSTPROCEDURE EVALUATION
Anesthesia Post Evaluation    Patient: Courtney CURRAN Reilly    Procedure(s) Performed: Procedure(s) (LRB):  ROBOTIC ARTHROPLASTY, KNEE, TOTAL (Left)    Final Anesthesia Type: general      Patient location during evaluation: PACU  Patient participation: Yes- Able to Participate  Level of consciousness: awake and alert  Post-procedure vital signs: reviewed and stable  Pain management: adequate  Airway patency: patent    PONV status at discharge: No PONV  Anesthetic complications: no      Cardiovascular status: blood pressure returned to baseline  Respiratory status: unassisted and room air  Hydration status: euvolemic  Follow-up not needed.              Vitals Value Taken Time   /65 08/04/25 15:10   Temp 36.8 °C (98.2 °F) 08/04/25 13:10   Pulse 73 08/04/25 15:10   Resp 16 08/04/25 15:10   SpO2 95 % 08/04/25 15:10         Event Time   Out of Recovery 15:10:00         Pain/Laila Score: Pain Rating Prior to Med Admin: 9 (8/4/2025  1:51 PM)  Pain Rating Post Med Admin: 4 (8/4/2025 10:29 AM)  Laila Score: 10 (8/4/2025  3:00 PM)  Modified Laila Score: 19 (8/4/2025  3:10 PM)

## 2025-08-04 NOTE — ANESTHESIA PROCEDURE NOTES
Peripheral Block    Patient location during procedure: pre-op   Block not for primary anesthetic.  Reason for block: at surgeon's request and post-op pain management   Post-op Pain Location: left knee   Start time: 8/4/2025 10:56 AM  Timeout: 8/4/2025 10:55 AM   End time: 8/4/2025 11:00 AM    Staffing  Authorizing Provider: Haily Helton MD  Performing Provider: Haily Helton MD    Staffing  Performed by: Haily Helton MD  Authorized by: Haily Helton MD    Preanesthetic Checklist  Completed: patient identified, IV checked, site marked, risks and benefits discussed, surgical consent, monitors and equipment checked, pre-op evaluation and timeout performed  Peripheral Block  Patient position: supine  Prep: ChloraPrep  Patient monitoring: heart rate, cardiac monitor, continuous pulse ox, continuous capnometry and frequent blood pressure checks  Block type: adductor canal  Laterality: left  Injection technique: single shot  Needle  Needle type: Stimuplex   Needle gauge: 21 G  Needle length: 4 in  Needle localization: anatomical landmarks and ultrasound guidance   -ultrasound image captured on disc.  Assessment  Injection assessment: negative aspiration, negative parasthesia and local visualized surrounding nerve  Paresthesia pain: none  Heart rate change: no  Slow fractionated injection: yes  Pain Tolerance: comfortable throughout block and no complaints  Medications:    Medications: bupivacaine (pf) (MARCAINE) injection 0.5% - Perineural   10 mL - 8/4/2025 11:00:00 AM    Additional Notes  VSS.  [facility] RN monitoring vitals throughout procedure.  Patient tolerated procedure well.     Exparel 20mL

## 2025-08-04 NOTE — ANESTHESIA PROCEDURE NOTES
Intubation    Date/Time: 8/4/2025 11:27 AM    Performed by: Bob Acevedo CRNA  Authorized by: Hialy Helton MD    Intubation:     Induction:  Intravenous    Intubated:  Postinduction    Mask Ventilation:  Easy mask    Attempts:  1    Attempted By:  CRNA    Difficult Airway Encountered?: No      Complications:  None    Airway Device:  Supraglottic airway/LMA    Airway Device Size:  3.0    Style/Cuff Inflation:  Cuffed (inflated to minimal occlusive pressure)    Placement Verified By:  Capnometry    Complicating Factors:  None    Findings Post-Intubation:  BS equal bilateral

## 2025-08-04 NOTE — PLAN OF CARE
Patient ready for surgery. Surgery and anesthesia consents signed. Educated on incentive spirometer use. Belongings in pre-op locker. Chandrakant set up with text message notifications.

## 2025-08-04 NOTE — PT/OT/SLP EVAL
Physical Therapy Evaluation and Discharge Note    Patient Name:  Courtney Reilly   MRN:  [id number]    Recommendations:     Discharge Recommendations: Low Intensity Therapy  Discharge Equipment Recommendations: none   Barriers to discharge: None    Assessment:     Courtney Reilly is a 65 y.o. female admitted with a medical diagnosis of <principal problem not specified>. .  Pt seen at post 05, scheduled for discharge. Pt seen for thera ex in supine. Min assist mobility and ambulated 200ft with RW. VC for correct RW use and for turns to move feet- pt is impulsive. She also completed stair training 5 ascending and descending via lateral technique. Her friend Chandrakant is present observing session/stair training.  LIT    Recent Surgery: Procedure(s) (LRB):  ROBOTIC ARTHROPLASTY, KNEE, TOTAL (Left) * Day of Surgery *    Plan:     During this hospitalization, patient does not require further acute PT services.  Please re-consult if situation changes.      Subjective   [place] and that her friend Chandrakant will stay overnight  Chief Complaint: pain posterior LK  Headache  Apologetic for her behavior at end of session  Patient/Family Comments/goals: get well  Pain/Comfort:  Pain Rating 1:  (not rated)  Location - Side 1: Left  Location - Orientation 1: posterior  Location 1: knee    Patients cultural, spiritual, Lutheran conflicts given the current situation:      Living Environment:  Home alone  4 steps to enter with rails   Prior to admission, patients level of function was indep.  Equipment used at home: none.  DME owned (not currently used): rolling walker.  Upon discharge, patient will have assistance from friend.    Objective:     Communicated with nurse Dominguez prior to session.  Patient found HOB elevated with   upon PT entry to room.    General Precautions: Standard, fall    Orthopedic Precautions:LLE weight bearing as tolerated   Braces: N/A  Respiratory Status:  Room air    Exams:  Postural Exam:  Patient presented with the following abnormalities:    -       Rounded shoulders  -       Forward head  -       BMI 24.13  RLE ROM: WFL  RLE Strength: WFL  LLE ROM: Deficits: LK flexion ~80*  LLE Strength: Deficits: 3-/5    Functional Mobility:  Bed Mobility:     Scooting: minimum assistance  Supine to Sit: minimum assistance  Transfers:     Sit to Stand:  minimum assistance with rolling walker  Bed to Chair: minimum assistance with  rolling walker  using  Stand Pivot  Gait: 200ft with RW min /CGA assist with cueing for safety    AM-PAC 6 CLICK MOBILITY  Total Score:18       Treatment and Education:  Patient was educated on the importance of OOB activity and functional mobility to negate negative effects of prolonged bed rest during hospitalization, safe transfers and ambulation, and D/C planning   Thera ex with AP,QS/GS  Education for HEP  OOb chair    AM-PAC 6 CLICK MOBILITY  Total Score:18     Patient left up in chair with all lines intact, call button in reach, nurse Angelica notified, and friend present.    GOALS:   Multidisciplinary Problems       Physical Therapy Goals       Not on file                    DME Justifications:  No DME recommended requiring DME justifications    History:     Past Medical History:   Diagnosis Date    Arthritis     Chronic back pain     COPD (chronic obstructive pulmonary disease)     Encounter for blood transfusion     Fatigue     Fibromyalgia     Full dentures     GERD (gastroesophageal reflux disease)     Herniated disc     Hypertension     Multiple sclerosis     On home O2     3 liters as needed    Ovarian tumor     Radiculopathy     Restless legs syndrome (RLS)     Spasticity     Stroke     twice    TIA (transient ischemic attack)        Past Surgical History:   Procedure Laterality Date    BREAST BIOPSY      CEREBRAL ANEURYSM REPAIR      HYSTERECTOMY      age 27    KNEE SURGERY  01/01/2008    left    pain pump implant      LA REMOVAL OF  OVARY/TUBE(S)      ROBOT-ASSISTED LAPAROSCOPIC REPAIR OF VENTRAL HERNIA Left 02/15/2022    Procedure: ROBOTIC REPAIR, HERNIA, INCISIONAL - LEFT FLANK;  Surgeon: Sixto Clark Jr., MD;  Location: [facility];  Service: General;  Laterality: Left;    SPINE SURGERY  01/01/2006      2 on neck.  2 on lumbar spine    TONSILLECTOMY      WRIST SURGERY  01/01/2007    right       Time Tracking:     PT Received On: 08/04/25  PT Start Time: 1706     PT Stop Time: 1729  PT Total Time (min): 23 min     Billable Minutes: Evaluation 10 and Gait Training 13      08/04/2025

## 2025-08-04 NOTE — PLAN OF CARE
Patient still reporting a headache, requesting more imitrex. Patient states at home when she has these headaches, that she will take 100 mg of imitrex and if her headache does not resolve, she will break a pill in half and take 50 mg of imitrex again. Dr. Helton notified. See MAR for new order. Will implement order and reassess.      1725--Patient reports her headache has improved slightly. Patient denies any needs at this time.

## 2025-08-04 NOTE — OP NOTE
[facility]  Surgery Department  Operative Note    SUMMARY     Date of Procedure: 8/4/2025     Procedure:  Left robotic total knee    Surgeons and Role:     * Ra Franklin MD - Primary    Assisting Surgeon:  Renan    Pre-Operative Diagnosis: Primary osteoarthritis of left knee [M17.12]  Pre-op testing [Z01.818]    Post-Operative Diagnosis: Post-Op Diagnosis Codes:     * Primary osteoarthritis of left knee [M17.12]     * Pre-op testing [Z01.818]    Anesthesia: General    Intraoperative Findings:  Bone-on-bone arthrosis left knee    Description of the Findings of the Procedure: Patient was placed on the operative table in the supine position.  The  knee was prepped and draped in the usual sterile manner for surgery.  An incision was made over the anterior aspect of the knee.  It was carried down sharply through the skin.  The medial parapatellar tissues were divided and the patella was taken laterally.  The effusion was aspirated.  The medial tibial plateau was taken down.  Two pins were placed just medial to the tibial tubercle for the proximal tibial array.  The tibial array was assembled.  We now placed the tibial checkpoint just medial to the tibial tubercle.  The knee was flexed to 90°.  Two pins were placed into the distal femur for the femoral array and the femoral array was assembled.  A femoral checkpoint was placed.  We now connected to the robotic system and determined the center of rotation of the hip.  The medial and lateral malleoli were identified.  The tibial and femoral check points were verified.  We now verified the femur and the tibia.  When this was completed the knee was brought into extension and the extension gap was determined and captured.  Similarly the flexion gap was determined and captured.  A robotic plan was created to balance the flexion-extension gap.  The plan was accepted.  When this was completed the robotic arm was brought into position and the femur  and the blade were both verified.  The cutting device was utilized and the femoral cuts were made.  Similarly the tibia was verified as was the tibial cutting blade and the tibial cut was made.  We now placed a femoral trial and the tibial trial.  We had full extension full flexion and completely balanced flexion-extension gaps.  This was determined both clinically and using the robotic measurements.  We now broached the tibia.  The patella was calibrated and cut and a button was placed.  The construct trialed perfectly with no lift-off.  We pulsed and irrigated.  We mixed bone cement and cemented 1st the tibia, next the femur and finally the patella.  All excess cement was removed at the time that we cemented and the construct was held in full extension until all the cement completely hardened.  We copiously irrigated again.  We closed the extensor mechanism with a combination of 2. FiberWire and a running Quill stitch.  We irrigated again and closed the subcu with 2-0 Stratafix.  We closed the skin with 4-0 Monocryl.  Sterile dressings were applied and the patient was noted to be in stable condition pending an x-ray and a neurovascular check.    Complications: No    Estimated Blood Loss (EBL): * No values recorded between 8/4/2025 11:12 AM and 8/4/2025 12:27 PM *           Implants:   Implant Name Type Inv. Item Serial No.  Lot No. LRB No. Used Action   PIN BONE [id number] - [id number]  PIN BONE [id number]  [employer]. [id number] Left 1 Implanted and Explanted   PIN FIXATION BONE 140X3.2MM - [id number]  PIN FIXATION BONE 140X3.2MM  [employer]. [id number] Left 1 Implanted and Explanted   CEMENT BONE SIMPLEX HV RADPQ - [id number]  CEMENT BONE SIMPLEX HV RADPQ  [employer] CANDELARIO. [id number] Left 2 Implanted   BASEPLATE TIB AYANA PRIM SZ 3 - [id number]  BASEPLATE TIB AYANA PRIM SZ 3  [employer] CANDEL[employer]. [id number] Left 1 Implanted   PATELLA TRI 33X9 X3 POLYETHYLE - [id number]  PATELLA TRI 33X9 X3  POLYETHYLE  [employer] CANDELARIO. [id number] Left 1 Implanted   BASEPLATE TIB AYANA PRIM SZ 3 - [id number]  BASEPLATE TIB AYANA PRIM SZ 3  [employer] CANDELARIO. [id number] Left 1 Implanted   INSERT TRIATHLON X3 SZ3 9MM - [id number]  INSERT TRIATHLON X3 SZ3 9MM  [employer] CANDELARIO. [id number] Left 1 Implanted   COMPONENT FEM CR TRI SZ3 L - [id number]  COMPONENT FEM CR TRI SZ3 L  [employer] CANDELARIO. [id number] Left 1 Implanted       Specimens:   Specimen (24h ago, onward)      None                    Condition: Good    Disposition: PACU - hemodynamically stable.              Discharge Note    SUMMARY     Admit Date: 8/4/2025    Discharge Date and Time:  08/04/2025 12:27 PM    Hospital Course (synopsis of major diagnoses, care, treatment, and services provided during the course of the hospital stay): Uneventful      Final Diagnosis: Post-Op Diagnosis Codes:     * Primary osteoarthritis of left knee [M17.12]     * Pre-op testing [Z01.818]    Disposition: Home or Self Care    Follow Up/Patient Instructions:     Medications:  Reconciled Home Medications:   Current Discharge Medication List        CONTINUE these medications which have NOT CHANGED    Details   albuterol (PROVENTIL/VENTOLIN HFA) 90 mcg/actuation inhaler Inhale 2 puffs into the lungs every 4 (four) hours as needed for Wheezing or Shortness of Breath. 2 puffs every 4 hours as needed for cough, wheeze, or shortness of breath  Qty: 54 g, Refills: 3    Associated Diagnoses: Chronic obstructive pulmonary disease, unspecified COPD type      dantrolene (DANTRIUM) 50 MG Cap Take 50 mg by mouth 3 (three) times daily. Only takes at night      irbesartan (AVAPRO) 150 MG tablet Take 150 mg by mouth once daily.      metoprolol succinate (TOPROL-XL) 50 MG 24 hr tablet Take 1 tablet (50 mg total) by mouth once daily.  Qty: 90 tablet, Refills: 3    Comments: .  Associated Diagnoses: Rapid heartbeat      oxyCODONE-acetaminophen  (PERCOCET)  mg per tablet Take 1 tablet by mouth 4 (four) times daily as needed.      pramipexole 1.5 mg Tb24       QUEtiapine (SEROQUEL) 25 MG Tab take 1 - 2 tablets BY MOUTH ONCE DAILY IN THE EVENING AS NEEDED      rosuvastatin (CRESTOR) 5 MG tablet Take 5 mg by mouth once daily.      sertraline (ZOLOFT) 50 MG tablet Take 50 mg by mouth once daily.      tiZANidine (ZANAFLEX) 4 MG tablet Take 4 mg by mouth every 8 (eight) hours.      aspirin (ECOTRIN) 81 MG EC tablet Take 1 tablet (81 mg total) by mouth every 12 (twelve) hours.  Qty: 60 tablet, Refills: 0    Associated Diagnoses: Primary osteoarthritis of left knee      celecoxib (CELEBREX) 200 MG capsule Take 1 capsule (200 mg total) by mouth 2 (two) times daily.  Qty: 60 capsule, Refills: 0    Associated Diagnoses: Primary osteoarthritis of left knee      cyanocobalamin, vitamin B-12, (B-12 DOTS ORAL) Take by mouth.      docusate sodium (COLACE) 100 MG capsule Take 1 capsule (100 mg total) by mouth 2 (two) times daily.  Qty: 60 capsule, Refills: 0    Associated Diagnoses: Primary osteoarthritis of left knee      EPINEPHrine (EPIPEN 2-MELANIE) 0.3 mg/0.3 mL AtIn Inject 0.3 mLs (0.3 mg total) into the muscle once. for 1 dose  Qty: 0.3 mL, Refills: 3    Associated Diagnoses: Allergic reaction to insect bite      fluticasone-salmeterol diskus inhaler 500-50 mcg Inhale 1 puff into the lungs 2 (two) times daily. Controller  Qty: 60 each, Refills: 11    Associated Diagnoses: COPD exacerbation; Severe persistent asthma without complication      fluticasone-umeclidin-vilanter (TRELEGY ELLIPTA) 200-62.5-25 mcg inhaler Inhale 1 puff into the lungs once daily.  Qty: 60 each, Refills: 11    Associated Diagnoses: Nicotine dependence, cigarettes, uncomplicated; Solitary pulmonary nodule; Family history of lung cancer      gabapentin (NEURONTIN) 300 MG capsule Take 1 capsule (300 mg total) by mouth 2 (two) times daily.  Qty: 60 capsule, Refills: 0    Associated Diagnoses:  Primary osteoarthritis of left knee      melatonin 5 mg Cap Take 1 capsule by mouth every evening.      multivitamin with minerals tablet Take 1 tablet by mouth once daily.      omeprazole (PRILOSEC) 20 MG capsule       ondansetron (ZOFRAN) 4 MG tablet Take 1 tablet (4 mg total) by mouth every 8 (eight) hours as needed for Nausea.  Qty: 30 tablet, Refills: 0    Comments: This prescription and the attached prescriptions are for postoperative use for the patient's scheduled total joint arthroplasty on Monday August 4, 2025.  This is for the meds to bed program.  Associated Diagnoses: Primary osteoarthritis of left knee      OXYGEN-AIR DELIVERY SYSTEMS MISC 3 L by Misc.(Non-Drug; Combo Route) route as needed.      !! predniSONE (DELTASONE) 20 MG tablet Take one daily for 3 days and may repeat for shortness of breath.  Qty: 21 tablet, Refills: 2    Associated Diagnoses: Chronic obstructive pulmonary disease, unspecified COPD type      !! predniSONE (DELTASONE) 20 MG tablet 3 for 3 days then 2 for 3 days then one for 3 days and repeat for breathing problems  Qty: 36 tablet, Refills: 2    Associated Diagnoses: COPD exacerbation; Severe persistent asthma without complication      solifenacin (VESICARE) 5 MG tablet Take 10 mg by mouth once daily.      sumatriptan (IMITREX) 100 MG tablet sumatriptan 100 mg tablet   TAKE ONE TABLET BY MOUTH AS NEEDED FOR MIGRAINE, MAY REPEAT in ONE HOUR AS NEEDED FOR HEADACHE max TWO PER DAY      TRINTELLIX 10 mg Tab Take 1 tablet by mouth.      vitamin E 400 UNIT capsule Take 400 Units by mouth once daily.       !! - Potential duplicate medications found. Please discuss with provider.        Discharge Procedure Orders   Diet general     Call MD for:  temperature >100.4     Call MD for:  persistent nausea and vomiting     Call MD for:  severe uncontrolled pain     Call MD for:  difficulty breathing, headache or visual disturbances     Call MD for:  redness, tenderness, or signs of infection  (pain, swelling, redness, odor or green/yellow discharge around incision site)     Call MD for:  hives     Call MD for:  persistent dizziness or light-headedness     Call MD for:  extreme fatigue      Follow-up Information       Care, [facility]. Call in 1 day(s).    Specialty: Home Health Services  Why: As needed  Contact information:  [address]  [id number]  [facility] [id number] [phone]

## 2025-08-04 NOTE — TRANSFER OF CARE
Anesthesia Transfer of Care Note    Patient: Courtney CURRAN Reilly    Procedure(s) Performed: Procedure(s) (LRB):  ROBOTIC ARTHROPLASTY, KNEE, TOTAL (Left)    Patient location: PACU    Anesthesia Type: general    Transport from OR: Transported from OR on 6-10 L/min O2 by face mask with adequate spontaneous ventilation    Post pain: adequate analgesia    Post assessment: no apparent anesthetic complications and tolerated procedure well    Post vital signs: stable    Level of consciousness: awake    Nausea/Vomiting: no nausea/vomiting    Complications: none    Transfer of care protocol was followed    Last vitals: Visit Vitals  BP (!) 189/72 (BP Location: Left arm, Patient Position: Sitting)   Pulse 90   Temp 36.2 °C (97.2 °F) (Temporal)   Resp 18   Wt 65.8 kg (145 lb)   SpO2 97%   Breastfeeding No   BMI 24.13 kg/m²

## 2025-08-05 ENCOUNTER — OFFICE VISIT (OUTPATIENT)
Dept: ORTHOPEDICS | Facility: CLINIC | Age: 65
End: 2025-08-05
Payer: MEDICARE

## 2025-08-05 VITALS — WEIGHT: 145.06 LBS | BODY MASS INDEX: 24.17 KG/M2 | HEIGHT: 65 IN

## 2025-08-05 VITALS
DIASTOLIC BLOOD PRESSURE: 67 MMHG | BODY MASS INDEX: 24.13 KG/M2 | TEMPERATURE: 98 F | SYSTOLIC BLOOD PRESSURE: 122 MMHG | WEIGHT: 145 LBS | OXYGEN SATURATION: 94 % | RESPIRATION RATE: 16 BRPM | HEART RATE: 86 BPM

## 2025-08-05 DIAGNOSIS — Z96.651 STATUS POST RIGHT KNEE REPLACEMENT: Primary | ICD-10-CM

## 2025-08-05 PROCEDURE — 99999 PR PBB SHADOW E&M-EST. PATIENT-LVL II: CPT | Mod: PBBFAC,HCNC,, | Performed by: ORTHOPAEDIC SURGERY

## 2025-08-05 PROCEDURE — 1160F RVW MEDS BY RX/DR IN RCRD: CPT | Mod: CPTII,HCNC,S$GLB, | Performed by: ORTHOPAEDIC SURGERY

## 2025-08-05 PROCEDURE — 1159F MED LIST DOCD IN RCRD: CPT | Mod: CPTII,HCNC,S$GLB, | Performed by: ORTHOPAEDIC SURGERY

## 2025-08-05 PROCEDURE — 4010F ACE/ARB THERAPY RXD/TAKEN: CPT | Mod: CPTII,HCNC,S$GLB, | Performed by: ORTHOPAEDIC SURGERY

## 2025-08-05 PROCEDURE — 99024 POSTOP FOLLOW-UP VISIT: CPT | Mod: HCNC,S$GLB,, | Performed by: ORTHOPAEDIC SURGERY

## 2025-08-05 NOTE — PROGRESS NOTES
[facility] POST-OP NOTE    Subjective:           Chief Complaint:   Chief Complaint   Patient presents with    Left Knee - Post-op Evaluation     POD1, s/p left TKA 8/4/25, she did pretty good last night, having a rough morning,        Past Medical History:   Diagnosis Date    Arthritis     Chronic back pain     COPD (chronic obstructive pulmonary disease)     Encounter for blood transfusion     Fatigue     Fibromyalgia     Full dentures     GERD (gastroesophageal reflux disease)     Herniated disc     Hypertension     Multiple sclerosis     On home O2     3 liters as needed    Ovarian tumor     Radiculopathy     Restless legs syndrome (RLS)     Spasticity     Stroke     twice    TIA (transient ischemic attack)        Past Surgical History:   Procedure Laterality Date    BREAST BIOPSY      CEREBRAL ANEURYSM REPAIR      HYSTERECTOMY      age 27    KNEE SURGERY  01/01/2008    left    pain pump implant      RI REMOVAL OF OVARY/TUBE(S)      ROBOT-ASSISTED LAPAROSCOPIC REPAIR OF VENTRAL HERNIA Left 02/15/2022    Procedure: ROBOTIC REPAIR, HERNIA, INCISIONAL - LEFT FLANK;  Surgeon: Sixto Clark Jr., MD;  Location: [facility];  Service: General;  Laterality: Left;    ROBOTIC TOTAL KNEE ARTHROPLASTY Left 08/04/2025    SPINE SURGERY  01/01/2006      2 on neck.  2 on lumbar spine    TONSILLECTOMY      WRIST SURGERY  01/01/2007    right       Current Outpatient Medications   Medication Sig    albuterol (PROVENTIL/VENTOLIN HFA) 90 mcg/actuation inhaler Inhale 2 puffs into the lungs every 4 (four) hours as needed for Wheezing or Shortness of Breath. 2 puffs every 4 hours as needed for cough, wheeze, or shortness of breath    aspirin (ECOTRIN) 81 MG EC tablet Take 1 tablet (81 mg total) by mouth every 12 (twelve) hours.    celecoxib (CELEBREX) 200 MG capsule Take 1 capsule (200 mg total) by mouth 2 (two) times daily.    cyanocobalamin, vitamin B-12, (B-12 DOTS ORAL) Take by mouth.    dantrolene (DANTRIUM) 50 MG  Cap Take 50 mg by mouth 3 (three) times daily. Only takes at night    docusate sodium (COLACE) 100 MG capsule Take 1 capsule (100 mg total) by mouth 2 (two) times daily.    EPINEPHrine (EPIPEN 2-MELANIE) 0.3 mg/0.3 mL AtIn Inject 0.3 mLs (0.3 mg total) into the muscle once. for 1 dose    fluticasone-salmeterol diskus inhaler 500-50 mcg Inhale 1 puff into the lungs 2 (two) times daily. Controller    fluticasone-umeclidin-vilanter (TRELEGY ELLIPTA) 200-62.5-25 mcg inhaler Inhale 1 puff into the lungs once daily.    gabapentin (NEURONTIN) 300 MG capsule Take 1 capsule (300 mg total) by mouth 2 (two) times daily.    irbesartan (AVAPRO) 150 MG tablet Take 150 mg by mouth once daily.    melatonin 5 mg Cap Take 1 capsule by mouth every evening.    metoprolol succinate (TOPROL-XL) 50 MG 24 hr tablet Take 1 tablet (50 mg total) by mouth once daily.    multivitamin with minerals tablet Take 1 tablet by mouth once daily.    omeprazole (PRILOSEC) 20 MG capsule     ondansetron (ZOFRAN) 4 MG tablet Take 1 tablet (4 mg total) by mouth every 8 (eight) hours as needed for Nausea.    oxyCODONE-acetaminophen (PERCOCET)  mg per tablet Take 1 tablet by mouth 4 (four) times daily as needed.    OXYGEN-AIR DELIVERY SYSTEMS MISC 3 L by Misc.(Non-Drug; Combo Route) route as needed.    pramipexole 1.5 mg Tb24     predniSONE (DELTASONE) 20 MG tablet Take one daily for 3 days and may repeat for shortness of breath.    predniSONE (DELTASONE) 20 MG tablet 3 for 3 days then 2 for 3 days then one for 3 days and repeat for breathing problems    QUEtiapine (SEROQUEL) 25 MG Tab take 1 - 2 tablets BY MOUTH ONCE DAILY IN THE EVENING AS NEEDED    rosuvastatin (CRESTOR) 5 MG tablet Take 5 mg by mouth once daily.    sertraline (ZOLOFT) 50 MG tablet Take 50 mg by mouth once daily.    solifenacin (VESICARE) 5 MG tablet Take 10 mg by mouth once daily.    sumatriptan (IMITREX) 100 MG tablet sumatriptan 100 mg tablet   TAKE ONE TABLET BY MOUTH AS NEEDED FOR  MIGRAINE, MAY REPEAT in ONE HOUR AS NEEDED FOR HEADACHE max TWO PER DAY    tiZANidine (ZANAFLEX) 4 MG tablet Take 4 mg by mouth every 8 (eight) hours.    TRINTELLIX 10 mg Tab Take 1 tablet by mouth.    vitamin E 400 UNIT capsule Take 400 Units by mouth once daily.     No current facility-administered medications for this visit.       Review of patient's allergies indicates:   Allergen Reactions    Bee pollen Anaphylaxis    Mushroom flavor Hives       Family History   Problem Relation Name Age of Onset    Cancer Mother          Lung cancer    Cancer Father          Lung cancer       Social History[1]    History of present illness:  Patient returns today status post total knee arthroplasty.  Denies any chest pain or shortness of breath.  Denies any calf pain.  Pain is well controlled.      Review of Systems:    Musculoskeletal:  See HPI      Objective:        Physical Examination:    Vital Signs:  There were no vitals filed for this visit.    Body mass index is 24.14 kg/m².    This a well-developed, well nourished patient in no acute distress.  They are alert and oriented and cooperative to examination.      Wounds are clean dry and intact.  Calf is soft.  Sensation is preserved.  Quad is active.          Assessment/Plan:      Stable postop day 1.  I went over the postoperative care with the patient in great detail.  We spoke about anticoagulation, pain control and a bowel program.  Patient clearly understands.  We will follow-up in approximately 10 days for suture removal or sooner if they develop increasing pain drainage calf pain or significant constipation.      Wound Care review: on approximately day 3 after surgery, or 72 hours after your initial surgery date, you may begin cleaning your wounds (AS LONG AS THERE IS NO DRAINAGE PRESENT).     Gentle cleansing with a mild soap and water is recommended. Pat the area dry, and then cover the wound with a clean, sterile dressing.  Do this at least once daily.  Do not  "apply any ointments creams or lotions to the wounds until told to do so.  Avoid submerging or immersing the wounds in water such as a sink, tub, or pool for at least 1 month after surgery.       At the time of your surgery you were prescribed a combination of various medications which may have included up to six prescriptions:      1. First prescription was for an anticoagulant.  Most commonly  aspirin 81 mg to be taken every 12 hours for 30 days postoperatively for DVT prophylaxis.  If you took aspirin prior to the start of this medication you can resume your normal aspirin dosing per your prescribing provider after 30 days.    If you were already on an anticoagulant then continue this medication as directed daily.    2.   Pain medication managed by her pain management provider.    3. Third prescription was for an anti-inflammatory, Celebrex 200 mg with instructions to take 1 tab by mouth as needed every 12 hours for 1 month postoperatively.     4. Fourth prescription is for Neurontin 300 mg to be taken as needed 1 tablet by mouth every 12 hours for 1 month postoperatively.    5. Fifth prescription was for Colace 100 mg to be taken as needed every 12 hours for constipation associated with narcotic use.    6.  A Sixth and final prescription may be an antiemetic such as Zofran to be taken as needed for postop nausea.      Sid De Luna, Physician Assistant, served in the capacity as a "scribe" for this patient encounter.  A "face-to-face" encounter occurred with Dr. Ra Franklin on this date.  The treatment plan and medical decision-making is outlined above. Patient was seen and examined with a chaperone.   This note was created using Dragon voice recognition software that occasionally misinterpreted phrases or words.           [1]   Social History  Socioeconomic History    Marital status:    Tobacco Use    Smoking status: Every Day     Current packs/day: 0.50     Average packs/day: 0.5 packs/day for 30.0 " years (15.0 ttl pk-yrs)     Types: Cigarettes    Smokeless tobacco: Never    Tobacco comments:     .    Occup:   disabled.   , lives alone.     Substance and Sexual Activity    Alcohol use: Yes     Comment: socially    Drug use: No    Sexual activity: Never     Partners: Male     Social Drivers of Health     Financial Resource Strain: Low Risk  (2024)    Overall Financial Resource Strain (CARDIA)     Difficulty of Paying Living Expenses: Not very hard   Food Insecurity: No Food Insecurity (2024)    Hunger Vital Sign     Worried About Running Out of Food in the Last Year: Never true     Ran Out of Food in the Last Year: Never true   Transportation Needs: Unmet Transportation Needs (2024)    TRANSPORTATION NEEDS     Transportation : Yes, it has kept me from medical appointments or from getting my medications.   Physical Activity: Insufficiently Active (5/15/2024)    Exercise Vital Sign     Days of Exercise per Week: 1 day     Minutes of Exercise per Session: 10 min   Stress: No Stress Concern Present (2024)    [employer] [place] [employer] - Occupational Stress Questionnaire     Feeling of Stress : Only a little   Housing Stability: Unknown (2024)    Housing Stability Vital Sign     Unable to Pay for Housing in the Last Year: No     Homeless in the Last Year: No

## 2025-08-05 NOTE — PLAN OF CARE
Meds-to-beds post-op prescription package delivered to patient's bedside per [facility]. [facility] states the only meds in the package are colace and zofran per patient request. Patient states she has all of the other meds that they prescribed for her post-op already, so she did not want those filled. Educated patient and patient's friend on each post-op prescription prescribed and that patient to take them as prescribed post-op, especially the aspirin 81 mg twice daily for one month post-op. Patient and patient's friend verbalized understanding.

## 2025-08-05 NOTE — PLAN OF CARE
Patient awake, alert, oriented. Vital signs stable. Patient verbalizes readiness for discharge. Patient cleared for discharge by PT. Patient reports having all necessary DME. Discharge instructions reviewed with patient and patient's friend. Patient and patient's friend verbalized understanding. IV removed, catheter intact. Dressing to LLE clean, dry, and intact. All belongings returned to patient. Patient transferred to car via wheelchair. Safety maintained.

## 2025-08-15 ENCOUNTER — OFFICE VISIT (OUTPATIENT)
Dept: ORTHOPEDICS | Facility: CLINIC | Age: 65
End: 2025-08-15
Payer: MEDICARE

## 2025-08-15 VITALS — WEIGHT: 140 LBS | BODY MASS INDEX: 23.32 KG/M2 | HEIGHT: 65 IN

## 2025-08-15 DIAGNOSIS — Z96.652 STATUS POST LEFT KNEE REPLACEMENT: Primary | ICD-10-CM

## 2025-08-15 PROCEDURE — 99999 PR PBB SHADOW E&M-EST. PATIENT-LVL V: CPT | Mod: PBBFAC,HCNC,,

## 2025-08-15 RX ORDER — HYDROMORPHONE HYDROCHLORIDE 4 MG/1
TABLET ORAL
COMMUNITY
Start: 2025-08-07

## (undated) DEVICE — PADDING WYTEX UNDRCST 6INX4YD

## (undated) DEVICE — SEE MEDLINE ITEM 154981

## (undated) DEVICE — BNDG COFLEX FOAM LF2 ST 6X5YD

## (undated) DEVICE — SYS SURGIPHOR STRL IRRG

## (undated) DEVICE — SEAL UNIVERSAL 5MM-8MM XI

## (undated) DEVICE — TOGA FLYTE PEEL AWAY XLARGE

## (undated) DEVICE — DRAPE LAP TIBURON 77X122IN

## (undated) DEVICE — GOWN TOGA SYS PEELWY ZIP 2 XL

## (undated) DEVICE — DRAPE STERI-DRAPE 1000 17X11IN

## (undated) DEVICE — KIT VIZADISC KNEE TRACKING

## (undated) DEVICE — PIN FIXATION BONE 140X3.2MM
Type: IMPLANTABLE DEVICE | Site: KNEE | Status: NON-FUNCTIONAL
Removed: 2025-08-04

## (undated) DEVICE — APPLICATOR CHLORAPREP ORN 26ML

## (undated) DEVICE — DRAPE INCISE IOBAN 2 23X33IN

## (undated) DEVICE — SUT MONOCRYL 4-0 PS-2

## (undated) DEVICE — Device

## (undated) DEVICE — NDL SPINAL 18GX3.5 SPINOCAN

## (undated) DEVICE — COVER TIP CURVED SCISSORS XI

## (undated) DEVICE — DRESSING AQUACEL FOAM 3 X 3

## (undated) DEVICE — UNDERGLOVES BIOGEL PI SIZE 7.5

## (undated) DEVICE — GLOVE SENSICARE PI GRN 6

## (undated) DEVICE — STAPLER SKIN PROXIMATE WIDE

## (undated) DEVICE — SEE MEDLINE ITEM 157150

## (undated) DEVICE — DRESSING AQUACEL SACRAL 8 X 7

## (undated) DEVICE — SPONGE BULKEE II ABSRB 6X6.75

## (undated) DEVICE — ADHESIVE MASTISOL VIAL 48/BX

## (undated) DEVICE — TOURNIQUET SB QC DP 34X4IN

## (undated) DEVICE — SLEEVE SCD EXPRESS KNEE MEDIUM

## (undated) DEVICE — ELECTRODE BLADE INSULATED 1 IN

## (undated) DEVICE — SEE MEDLINE ITEM 146292

## (undated) DEVICE — SUT VICRYL PLUS 2-0

## (undated) DEVICE — PACK SIRUS BASIC V SURG STRL

## (undated) DEVICE — DRAPE COLUMN DAVINCI XI

## (undated) DEVICE — SUT STRATAFIX PDS 1 CTX 18IN

## (undated) DEVICE — DRESSING AQUACEL FOAM 5 X 5

## (undated) DEVICE — SUT FIBERWIRE 2 38 IN TAPER

## (undated) DEVICE — ELECTRODE REM PLYHSV RETURN 9

## (undated) DEVICE — PIN BONE 3.2X110MM
Type: IMPLANTABLE DEVICE | Site: KNEE | Status: NON-FUNCTIONAL
Removed: 2025-08-04

## (undated) DEVICE — OBTURATOR BLADELESS 8MM XI CLR

## (undated) DEVICE — PACK CUSTOM UNIV BASIN SLI

## (undated) DEVICE — DRAPE ARM DAVINCI XI

## (undated) DEVICE — GLOVE SENSICARE PI ALOE 6.5

## (undated) DEVICE — KIT DRAPE RIO ONE PIECE W/POCK

## (undated) DEVICE — GLOVE SENSICARE PI ALOE 8.5

## (undated) DEVICE — SEE MEDLINE ITEM 157116

## (undated) DEVICE — COVER TABLE 44X90 STERILE

## (undated) DEVICE — TUBE FRAZIER 5MM 2FT SOFT TIP

## (undated) DEVICE — WRAP KNEE ACCU THERM GEL PACK

## (undated) DEVICE — KIT TRIATHLON CR FEM PREP SZ3

## (undated) DEVICE — SYR 50CC LL

## (undated) DEVICE — NDL SAFETY 21G X 1 1/2 ECLPSE

## (undated) DEVICE — GLOVE SURG ULTRA TOUCH 6

## (undated) DEVICE — SOL ELECTROLUBE ANTI-STIC

## (undated) DEVICE — BNDG COFLEX FOAM LF2 ST 4X5YD

## (undated) DEVICE — SYR ONLY LUER LOCK 20CC

## (undated) DEVICE — TAPE SILK 3IN

## (undated) DEVICE — BLADE SURG CARBON STEEL SZ11

## (undated) DEVICE — GLOVE SENSICARE PI GRN 6.5

## (undated) DEVICE — SYR 3CC LUER LOC

## (undated) DEVICE — KIT SURGIFLO EVITHROM

## (undated) DEVICE — PACK EXTREMITY ORTHOMAX

## (undated) DEVICE — BLADE SAW SGTL 21.2X85.5X1.2

## (undated) DEVICE — DURAPREP SURG SCRUB 26ML

## (undated) DEVICE — SYS CLSR DERMABOND PRINEO 42CM

## (undated) DEVICE — KIT TRIATHLON TIBIAL SIZER

## (undated) DEVICE — DRAPE C ARM 42 X 120 10/BX

## (undated) DEVICE — BANDAGE ESMARK ELASTIC ST 6X9

## (undated) DEVICE — STRAP OR TABLE 5IN X 72IN

## (undated) DEVICE — BANDAGE ACE DOUBLE STER 6IN

## (undated) DEVICE — SUT SILK 2-0 SH 18IN BLACK

## (undated) DEVICE — DRAPE ABDOMINAL TIBURON 14X11

## (undated) DEVICE — KIT LEG POSITIONER DISPOSABLES

## (undated) DEVICE — SOL NACL IRR 1000ML BTL

## (undated) DEVICE — SUT V-LOC 180 2-0 GS-22 9IN

## (undated) DEVICE — SUT VICRYL PLUS 3-0 SH 18IN

## (undated) DEVICE — CLOSURE SKIN STERI STRIP 1/2X4

## (undated) DEVICE — SEE MEDLINE ITEM 157148

## (undated) DEVICE — DRAPE U SPLIT SHEET 54X76IN

## (undated) DEVICE — DRAPE STERI INSTRUMENT 1018

## (undated) DEVICE — SYR 10CC LUER LOCK

## (undated) DEVICE — SEE MEDLINE ITEM 156905

## (undated) DEVICE — TROCAR ENDO Z THREAD KII 5X100

## (undated) DEVICE — DRESSING AQUACEL AG FOAM 4X4

## (undated) DEVICE — SEE MEDLINE ITEM 157117

## (undated) DEVICE — SUT MCRYL PLUS 4-0 PS2 27IN

## (undated) DEVICE — SOL CLEARIFY VISUALIZATION LAP

## (undated) DEVICE — ALCOHOL 70% ANTISEPTIC ISO 4OZ

## (undated) DEVICE — ELECTRODE MEGADYNE RETURN DUAL

## (undated) DEVICE — INTERPULSE SET

## (undated) DEVICE — CORD BIPOLAR 12 FOOT

## (undated) DEVICE — DRAPE THREE-QTR REINF 53X77IN

## (undated) DEVICE — BLADE SURG CARBON STEEL #10

## (undated) DEVICE — DRAPE INCISE IOBAN 2 23X17IN

## (undated) DEVICE — DRESSING TRANS 4X4 TEGADERM

## (undated) DEVICE — SET TUBE PNEUMOCLEAR SE HI FLO

## (undated) DEVICE — DRESSING GAUZE OIL EMUL 3X8

## (undated) DEVICE — PADDING CAST SPECIALIST 6X4YD

## (undated) DEVICE — PENCIL SMK EVAC CONNECTOR 10FT

## (undated) DEVICE — TOWEL OR DISP STRL BLUE 4/PK

## (undated) DEVICE — TROCAR KII FIOS 12MM X 100MM

## (undated) DEVICE — ADHESIVE DERMABOND ADVANCED

## (undated) DEVICE — GLOVE SURG ULTRA TOUCH 7.5

## (undated) DEVICE — DRAPE INVISISHIELD TOWEL SMALL

## (undated) DEVICE — YANKAUER FLEX NO VENT HI CAP

## (undated) DEVICE — CATH URETHRAL RED 16FR

## (undated) DEVICE — SUT 4/0 18IN NUROLON BLK B

## (undated) DEVICE — GLOVE SENSICARE PI ALOE 6

## (undated) DEVICE — MANIFOLD 4 PORT

## (undated) DEVICE — BLADE MAKO NARROW

## (undated) DEVICE — DECANTER FLUID TRNSF WHITE 9IN

## (undated) DEVICE — NDL HYPO REG 25G X 1 1/2

## (undated) DEVICE — GLOVE BIOGEL PIMICRO INDIC 6.5

## (undated) DEVICE — SOL NACL IRR 3000ML

## (undated) DEVICE — MIXER BONE CEMENT

## (undated) DEVICE — PAD DERMAPROX XL 22X14X.5IN

## (undated) DEVICE — SOL WATER STRL IRR 1000ML

## (undated) DEVICE — SUT STRATAFIX SPIRAL VIOLET

## (undated) DEVICE — GLOVE SENSICARE PI GRN 8.5